# Patient Record
Sex: FEMALE | Race: BLACK OR AFRICAN AMERICAN | NOT HISPANIC OR LATINO | URBAN - METROPOLITAN AREA
[De-identification: names, ages, dates, MRNs, and addresses within clinical notes are randomized per-mention and may not be internally consistent; named-entity substitution may affect disease eponyms.]

---

## 2017-02-18 ENCOUNTER — INPATIENT (INPATIENT)
Facility: HOSPITAL | Age: 52
LOS: 9 days | Discharge: HOME | End: 2017-02-28
Attending: PSYCHIATRY & NEUROLOGY | Admitting: PSYCHIATRY & NEUROLOGY
Payer: MEDICARE

## 2017-02-18 VITALS
TEMPERATURE: 98 F | SYSTOLIC BLOOD PRESSURE: 136 MMHG | OXYGEN SATURATION: 100 % | DIASTOLIC BLOOD PRESSURE: 86 MMHG | HEART RATE: 90 BPM | RESPIRATION RATE: 18 BRPM

## 2017-02-18 DIAGNOSIS — F20.0 PARANOID SCHIZOPHRENIA: ICD-10-CM

## 2017-02-18 DIAGNOSIS — Z95.828 PRESENCE OF OTHER VASCULAR IMPLANTS AND GRAFTS: Chronic | ICD-10-CM

## 2017-02-18 LAB
ALBUMIN SERPL ELPH-MCNC: 3.5 G/DL — SIGNIFICANT CHANGE UP (ref 3.3–5)
ALP SERPL-CCNC: 71 U/L — SIGNIFICANT CHANGE UP (ref 40–120)
ALT FLD-CCNC: 15 U/L — SIGNIFICANT CHANGE UP (ref 4–33)
AMPHET UR-MCNC: NEGATIVE — SIGNIFICANT CHANGE UP
APAP SERPL-MCNC: < 15 UG/ML — LOW (ref 15–25)
APPEARANCE UR: SIGNIFICANT CHANGE UP
APTT BLD: 38.8 SEC — HIGH (ref 27.5–37.4)
AST SERPL-CCNC: 17 U/L — SIGNIFICANT CHANGE UP (ref 4–32)
BARBITURATES MEASUREMENT: NEGATIVE — SIGNIFICANT CHANGE UP
BARBITURATES UR SCN-MCNC: NEGATIVE — SIGNIFICANT CHANGE UP
BASOPHILS # BLD AUTO: 0.03 K/UL — SIGNIFICANT CHANGE UP (ref 0–0.2)
BASOPHILS NFR BLD AUTO: 0.2 % — SIGNIFICANT CHANGE UP (ref 0–2)
BASOPHILS NFR SPEC: 0 % — SIGNIFICANT CHANGE UP (ref 0–2)
BENZODIAZ SERPL-MCNC: NEGATIVE — SIGNIFICANT CHANGE UP
BENZODIAZ UR-MCNC: NEGATIVE — SIGNIFICANT CHANGE UP
BILIRUB SERPL-MCNC: 0.4 MG/DL — SIGNIFICANT CHANGE UP (ref 0.2–1.2)
BILIRUB UR-MCNC: NEGATIVE — SIGNIFICANT CHANGE UP
BLOOD UR QL VISUAL: HIGH
BUN SERPL-MCNC: 11 MG/DL — SIGNIFICANT CHANGE UP (ref 7–23)
CALCIUM SERPL-MCNC: 9.1 MG/DL — SIGNIFICANT CHANGE UP (ref 8.4–10.5)
CANNABINOIDS UR-MCNC: NEGATIVE — SIGNIFICANT CHANGE UP
CHLORIDE SERPL-SCNC: 106 MMOL/L — SIGNIFICANT CHANGE UP (ref 98–107)
CO2 SERPL-SCNC: 19 MMOL/L — LOW (ref 22–31)
COCAINE METAB.OTHER UR-MCNC: NEGATIVE — SIGNIFICANT CHANGE UP
COLOR SPEC: HIGH
CREAT SERPL-MCNC: 1.01 MG/DL — SIGNIFICANT CHANGE UP (ref 0.5–1.3)
EOSINOPHIL # BLD AUTO: 0.09 K/UL — SIGNIFICANT CHANGE UP (ref 0–0.5)
EOSINOPHIL NFR BLD AUTO: 0.6 % — SIGNIFICANT CHANGE UP (ref 0–6)
EOSINOPHIL NFR FLD: 0 % — SIGNIFICANT CHANGE UP (ref 0–6)
ETHANOL BLD-MCNC: < 10 MG/DL — SIGNIFICANT CHANGE UP
GLUCOSE SERPL-MCNC: 125 MG/DL — HIGH (ref 70–99)
GLUCOSE UR-MCNC: NEGATIVE — SIGNIFICANT CHANGE UP
HCG SERPL-ACNC: < 5 MIU/ML — SIGNIFICANT CHANGE UP
HCT VFR BLD CALC: 36.2 % — SIGNIFICANT CHANGE UP (ref 34.5–45)
HGB BLD-MCNC: 12.3 G/DL — SIGNIFICANT CHANGE UP (ref 11.5–15.5)
IMM GRANULOCYTES NFR BLD AUTO: 0.3 % — SIGNIFICANT CHANGE UP (ref 0–1.5)
INR BLD: 1.36 — HIGH (ref 0.87–1.18)
KETONES UR-MCNC: SIGNIFICANT CHANGE UP
LEUKOCYTE ESTERASE UR-ACNC: HIGH
LYMPHOCYTES # BLD AUTO: 18.9 % — SIGNIFICANT CHANGE UP (ref 13–44)
LYMPHOCYTES # BLD AUTO: 2.66 K/UL — SIGNIFICANT CHANGE UP (ref 1–3.3)
LYMPHOCYTES NFR SPEC AUTO: 22 % — SIGNIFICANT CHANGE UP (ref 13–44)
MANUAL SMEAR VERIFICATION: SIGNIFICANT CHANGE UP
MCHC RBC-ENTMCNC: 28.9 PG — SIGNIFICANT CHANGE UP (ref 27–34)
MCHC RBC-ENTMCNC: 34 % — SIGNIFICANT CHANGE UP (ref 32–36)
MCV RBC AUTO: 85 FL — SIGNIFICANT CHANGE UP (ref 80–100)
METHADONE UR-MCNC: NEGATIVE — SIGNIFICANT CHANGE UP
MICROCYTES BLD QL: SLIGHT — SIGNIFICANT CHANGE UP
MONOCYTES # BLD AUTO: 0.79 K/UL — SIGNIFICANT CHANGE UP (ref 0–0.9)
MONOCYTES NFR BLD AUTO: 5.6 % — SIGNIFICANT CHANGE UP (ref 2–14)
MONOCYTES NFR BLD: 5 % — SIGNIFICANT CHANGE UP (ref 2–9)
MUCOUS THREADS # UR AUTO: SIGNIFICANT CHANGE UP
NEUTROPHIL AB SER-ACNC: 72 % — SIGNIFICANT CHANGE UP (ref 43–77)
NEUTROPHILS # BLD AUTO: 10.43 K/UL — HIGH (ref 1.8–7.4)
NEUTROPHILS NFR BLD AUTO: 74.4 % — SIGNIFICANT CHANGE UP (ref 43–77)
NITRITE UR-MCNC: NEGATIVE — SIGNIFICANT CHANGE UP
OPIATES UR-MCNC: NEGATIVE — SIGNIFICANT CHANGE UP
OXYCODONE UR-MCNC: NEGATIVE — SIGNIFICANT CHANGE UP
PCP UR-MCNC: NEGATIVE — SIGNIFICANT CHANGE UP
PH UR: 6.5 — SIGNIFICANT CHANGE UP (ref 4.6–8)
PLATELET # BLD AUTO: 413 K/UL — HIGH (ref 150–400)
PLATELET COUNT - ESTIMATE: NORMAL — SIGNIFICANT CHANGE UP
PMV BLD: 10.5 FL — SIGNIFICANT CHANGE UP (ref 7–13)
POTASSIUM SERPL-MCNC: 3.5 MMOL/L — SIGNIFICANT CHANGE UP (ref 3.5–5.3)
POTASSIUM SERPL-SCNC: 3.5 MMOL/L — SIGNIFICANT CHANGE UP (ref 3.5–5.3)
PROT SERPL-MCNC: 7.4 G/DL — SIGNIFICANT CHANGE UP (ref 6–8.3)
PROT UR-MCNC: 100 — HIGH
PROTHROM AB SERPL-ACNC: 15.5 SEC — HIGH (ref 10–13.1)
RBC # BLD: 4.26 M/UL — SIGNIFICANT CHANGE UP (ref 3.8–5.2)
RBC # FLD: 15.4 % — HIGH (ref 10.3–14.5)
RBC CASTS # UR COMP ASSIST: >50 — HIGH (ref 0–?)
SALICYLATES SERPL-MCNC: < 5 MG/DL — LOW (ref 15–30)
SODIUM SERPL-SCNC: 138 MMOL/L — SIGNIFICANT CHANGE UP (ref 135–145)
SP GR SPEC: 1.02 — SIGNIFICANT CHANGE UP (ref 1–1.03)
SQUAMOUS # UR AUTO: SIGNIFICANT CHANGE UP
TSH SERPL-MCNC: 2.35 UIU/ML — SIGNIFICANT CHANGE UP (ref 0.27–4.2)
UROBILINOGEN FLD QL: NORMAL E.U. — SIGNIFICANT CHANGE UP (ref 0.1–0.2)
VARIANT LYMPHS # BLD: 1 % — SIGNIFICANT CHANGE UP
WBC # BLD: 14.04 K/UL — HIGH (ref 3.8–10.5)
WBC # FLD AUTO: 14.04 K/UL — HIGH (ref 3.8–10.5)
WBC UR QL: HIGH (ref 0–?)

## 2017-02-18 PROCEDURE — 99285 EMERGENCY DEPT VISIT HI MDM: CPT

## 2017-02-18 RX ORDER — HALOPERIDOL DECANOATE 100 MG/ML
5 INJECTION INTRAMUSCULAR EVERY 6 HOURS
Qty: 0 | Refills: 0 | Status: DISCONTINUED | OUTPATIENT
Start: 2017-02-18 | End: 2017-02-28

## 2017-02-18 RX ORDER — DIPHENHYDRAMINE HCL 50 MG
50 CAPSULE ORAL EVERY 6 HOURS
Qty: 0 | Refills: 0 | Status: DISCONTINUED | OUTPATIENT
Start: 2017-02-18 | End: 2017-02-28

## 2017-02-18 RX ORDER — HALOPERIDOL DECANOATE 100 MG/ML
5 INJECTION INTRAMUSCULAR EVERY 6 HOURS
Qty: 0 | Refills: 0 | Status: DISCONTINUED | OUTPATIENT
Start: 2017-02-18 | End: 2017-02-18

## 2017-02-18 RX ORDER — BENZTROPINE MESYLATE 1 MG
0.5 TABLET ORAL DAILY
Qty: 0 | Refills: 0 | Status: DISCONTINUED | OUTPATIENT
Start: 2017-02-18 | End: 2017-02-28

## 2017-02-18 RX ORDER — CEPHALEXIN 500 MG
500 CAPSULE ORAL EVERY 12 HOURS
Qty: 0 | Refills: 0 | Status: COMPLETED | OUTPATIENT
Start: 2017-02-18 | End: 2017-02-21

## 2017-02-18 RX ORDER — MIRTAZAPINE 45 MG/1
15 TABLET, ORALLY DISINTEGRATING ORAL AT BEDTIME
Qty: 0 | Refills: 0 | Status: DISCONTINUED | OUTPATIENT
Start: 2017-02-18 | End: 2017-02-28

## 2017-02-18 RX ORDER — APIXABAN 2.5 MG/1
2.5 TABLET, FILM COATED ORAL
Qty: 0 | Refills: 0 | Status: DISCONTINUED | OUTPATIENT
Start: 2017-02-18 | End: 2017-02-28

## 2017-02-18 RX ORDER — HALOPERIDOL DECANOATE 100 MG/ML
5 INJECTION INTRAMUSCULAR ONCE
Qty: 0 | Refills: 0 | Status: DISCONTINUED | OUTPATIENT
Start: 2017-02-18 | End: 2017-02-28

## 2017-02-18 RX ORDER — HALOPERIDOL DECANOATE 100 MG/ML
5 INJECTION INTRAMUSCULAR AT BEDTIME
Qty: 0 | Refills: 0 | Status: DISCONTINUED | OUTPATIENT
Start: 2017-02-18 | End: 2017-02-20

## 2017-02-18 RX ORDER — CEPHALEXIN 500 MG
500 CAPSULE ORAL EVERY 12 HOURS
Qty: 0 | Refills: 0 | Status: DISCONTINUED | OUTPATIENT
Start: 2017-02-18 | End: 2017-02-18

## 2017-02-18 RX ADMIN — HALOPERIDOL DECANOATE 5 MILLIGRAM(S): 100 INJECTION INTRAMUSCULAR at 20:49

## 2017-02-18 RX ADMIN — APIXABAN 2.5 MILLIGRAM(S): 2.5 TABLET, FILM COATED ORAL at 20:49

## 2017-02-18 RX ADMIN — Medication 500 MILLIGRAM(S): at 20:49

## 2017-02-18 RX ADMIN — MIRTAZAPINE 15 MILLIGRAM(S): 45 TABLET, ORALLY DISINTEGRATING ORAL at 20:49

## 2017-02-18 NOTE — ED BEHAVIORAL HEALTH ASSESSMENT NOTE - CURRENT MEDICATION
per daughter: Haldol 1mg po daily  Cogentin 0.5mg po daily  Ziprasidone 20mg po daily  Mirtazipine 15mg po qHS  Eliquis - patient ran out per daughter

## 2017-02-18 NOTE — ED BEHAVIORAL HEALTH ASSESSMENT NOTE - HPI (INCLUDE ILLNESS QUALITY, SEVERITY, DURATION, TIMING, CONTEXT, MODIFYING FACTORS, ASSOCIATED SIGNS AND SYMPTOMS)
51 year old [facility] female, domiciled with sister and daughter, PPH of Schizophrenia (first diagnosed 2007), infrequent prior inpatient admissions, last being 2yrs ago for psychosis, currently on haldol, cogentin, no prior suicide attempts or SIB, PMH of HTN, ?DM (patient unsure) and a blood clot in March 2016 (IVC filter removed Dec 2016), no history of substance abuse, no history of violence or arrests, brought in by EMS, activated by daughter, for decompensation, insomnia and auditory hallucinations.     Collateral obtained from daughter, Katarina,  for past two weeks has been crying daily about hearing voices telling her that they are going to kill her or kill others. She states she is fearful of the voices. They also tell her she has HIV. During this time, she has not been eating or caring for herself, not sleeping, and up at night pacing. She has been compliant with her medications, but daughter feels they are not helping her. Last time she was inpatient was over 2 yrs. ago and she had been fairly stable until recently.     Patient cooperative. Tearful in reporting her hallucinations. Reports that she has been hearing voices repeatedly stating they are going to kill her and kill her family. She is afraid to sleep because she is afraid something may happen to her or her family so she has been up and vigilant at night. Also reports that the voices tell her she has HIV. Denies SI/HI/I/P. She has been questionably compliant with her medications (cannot recall names or doses in ED and BP is elevated). Patient denies manic symptoms including elevated mood, increased irritability, mood lability, distractibility, grandiosity, pressured speech, increase in goal-directed activity, or decreased need for sleep. Denies substance abuse.

## 2017-02-18 NOTE — ED PROVIDER NOTE - MUSCULOSKELETAL, MLM
Spine appears normal, range of motion is not limited, no muscle or joint tenderness no pedal edema. no calf tenderness. normal pulses bilateral feet.

## 2017-02-18 NOTE — ED PROVIDER NOTE - PROGRESS NOTE DETAILS
Abdoulaye colon pt and daughter Katarina plan to treat with three days for urine and sent urine culture.  Continue Eliquis at 5mg qd.  pt admitted by psych. Plan abdoulaye colon psych attending as well. Abdoulaye colon pt and daughter Katarina plan to treat with three days for urine keflex 500mg bid and sent urine culture.  Continue Eliquis at 5mg qd.  pt admitted by psych. Plan abdoulaye colon psych attending as well.

## 2017-02-18 NOTE — ED ADULT NURSE REASSESSMENT NOTE - NS ED NURSE REASSESS COMMENT FT1
Pt medicated with Haldol 5mg IM as per MD rx pt in nad eval on going.  1245 Pt giving Keflex 500 mg as per MD rx eval on going.

## 2017-02-18 NOTE — ED BEHAVIORAL HEALTH ASSESSMENT NOTE - SUMMARY
51 year old [facility] female, domiciled with sister and daughter, PPH of Schizophrenia (first diagnosed 2007), infrequent prior inpatient admissions, last being 2yrs ago for psychosis, currently on haldol, cogentin, no prior suicide attempts or SIB, PMH of HTN, ?DM (patient unsure) and a blood clot in March 2016 (IVC filter removed Dec 2016), no history of substance abuse, no history of violence or arrests, brought in by EMS, activated by daughter, for decompensation, insomnia and auditory hallucinations.     Patient presents referred by daughter for decompensation. In ED, patient is paranoid and responding to  internal stimuli. She reports being fearful that the voices are going to harm her and her family and has been unable to sleep for 2 weeks as a result, as well as tearful and crying. Per daughter patient has not been eating, has lost some weight and has not been caring for herself as she usually does. Patient meets criteria for voluntary admission and is requesting to sign in. She will be admitted.

## 2017-02-18 NOTE — ED BEHAVIORAL HEALTH ASSESSMENT NOTE - OTHER
daughter Katarina Patient unable to function in her current state with acute exacerbation of psychotic symptoms

## 2017-02-18 NOTE — ED BEHAVIORAL HEALTH ASSESSMENT NOTE - OTHER PAST PSYCHIATRIC HISTORY (INCLUDE DETAILS REGARDING ONSET, COURSE OF ILLNESS, INPATIENT/OUTPATIENT TREATMENT)
History of Schizophrenia (first diagnosed 2007),Previously diagnosed with Schizophrenia in 2007 at this facility. Subsequently hospitalized in 2013 for decompensation into same. Patient is aware of multiple past medication trials but does not recall specific names. She does not believe she was ever on Clozapine, no prior suicide attempts or SIB

## 2017-02-18 NOTE — ED BEHAVIORAL HEALTH ASSESSMENT NOTE - DESCRIPTION
calm and cooperative, no prns or restraints required PMH of HTN, ?DM (patient unsure) and a blood clot in March 2016 (IVC filter removed Dec 2016) born in [place], has three grown children, lives at home with her sister and daughter

## 2017-02-18 NOTE — ED ADULT NURSE NOTE - OBJECTIVE STATEMENT
Received pt in  pt c/o Ah  & insomnia pt  calm & cooperative denies Si/Hi/Vh at present eval on going.

## 2017-02-18 NOTE — ED ADULT TRIAGE NOTE - CHIEF COMPLAINT QUOTE
Pts sister called EMS. Pt arrives with c/o not sleeping and hearing voices. Pt is calm in triage, not sure when she last took medication.

## 2017-02-18 NOTE — ED PROVIDER NOTE - OBJECTIVE STATEMENT
Attending  51y0F from home hx of schizophrenia, HTN, hx of blood clot on Eliquis pw hearing voices for two weeks. pt states "scared of the voices"  Not sleeping or eating   DW w Daughter Katarina endorses pt has been agitated, not sleeping and hearing voices. Does not endorse any sob/cp. "Breathing is not labored"  no fever/cough. no calf pain. no abdominal pain. no nausea/vomit. Daughter states pt has been taking Eliquis does not known dose. [place]  [place]. nonsmoker. no sick contact.

## 2017-02-18 NOTE — ED BEHAVIORAL HEALTH ASSESSMENT NOTE - PSYCHIATRIC ISSUES AND PLAN (INCLUDE STANDING AND PRN MEDICATION)
Increase Haldol to 5mg, keep rest of medications as is for now. Haldol 5mg PO q 4 hrs prn psychosis; Ativan 2mg PO q 4hrs prn anxiety, Diphenhydramine 50mg, PO q 4hr prn EPS, insomnia or agitaition.      Haldol 5mg IM; Ativan 2mg IM, Diphenhydramine 50mg IM once prn severe agitation (combativeness, assaultive behavior)

## 2017-02-18 NOTE — ED BEHAVIORAL HEALTH ASSESSMENT NOTE - RISK ASSESSMENT
risks include subtherapeutic dosing of medications, questionable compliance of all medications, insomnia, paranoia and acute change in psychotic symptoms. Protective factors include no suicide attempts, no violence history,  no access to guns, no substance abuse, supportive family, willingness to seek help, no suicidal ideation or homicidal ideation

## 2017-02-19 LAB — SPECIMEN SOURCE: SIGNIFICANT CHANGE UP

## 2017-02-19 RX ADMIN — Medication 500 MILLIGRAM(S): at 08:51

## 2017-02-19 RX ADMIN — Medication 500 MILLIGRAM(S): at 20:29

## 2017-02-19 RX ADMIN — Medication 0.5 MILLIGRAM(S): at 08:51

## 2017-02-19 RX ADMIN — APIXABAN 2.5 MILLIGRAM(S): 2.5 TABLET, FILM COATED ORAL at 10:03

## 2017-02-19 RX ADMIN — MIRTAZAPINE 15 MILLIGRAM(S): 45 TABLET, ORALLY DISINTEGRATING ORAL at 20:31

## 2017-02-19 RX ADMIN — HALOPERIDOL DECANOATE 5 MILLIGRAM(S): 100 INJECTION INTRAMUSCULAR at 20:29

## 2017-02-19 RX ADMIN — APIXABAN 2.5 MILLIGRAM(S): 2.5 TABLET, FILM COATED ORAL at 20:29

## 2017-02-20 LAB — BACTERIA UR CULT: SIGNIFICANT CHANGE UP

## 2017-02-20 PROCEDURE — 99232 SBSQ HOSP IP/OBS MODERATE 35: CPT

## 2017-02-20 RX ORDER — HALOPERIDOL DECANOATE 100 MG/ML
7.5 INJECTION INTRAMUSCULAR AT BEDTIME
Qty: 0 | Refills: 0 | Status: DISCONTINUED | OUTPATIENT
Start: 2017-02-20 | End: 2017-02-23

## 2017-02-20 RX ADMIN — APIXABAN 2.5 MILLIGRAM(S): 2.5 TABLET, FILM COATED ORAL at 20:53

## 2017-02-20 RX ADMIN — APIXABAN 2.5 MILLIGRAM(S): 2.5 TABLET, FILM COATED ORAL at 10:00

## 2017-02-20 RX ADMIN — Medication 500 MILLIGRAM(S): at 20:53

## 2017-02-20 RX ADMIN — Medication 0.5 MILLIGRAM(S): at 10:04

## 2017-02-20 RX ADMIN — MIRTAZAPINE 15 MILLIGRAM(S): 45 TABLET, ORALLY DISINTEGRATING ORAL at 20:53

## 2017-02-20 RX ADMIN — Medication 500 MILLIGRAM(S): at 10:04

## 2017-02-20 RX ADMIN — HALOPERIDOL DECANOATE 7.5 MILLIGRAM(S): 100 INJECTION INTRAMUSCULAR at 20:53

## 2017-02-21 PROCEDURE — 99233 SBSQ HOSP IP/OBS HIGH 50: CPT

## 2017-02-21 RX ADMIN — MIRTAZAPINE 15 MILLIGRAM(S): 45 TABLET, ORALLY DISINTEGRATING ORAL at 20:38

## 2017-02-21 RX ADMIN — Medication 500 MILLIGRAM(S): at 09:13

## 2017-02-21 RX ADMIN — Medication 0.5 MILLIGRAM(S): at 09:13

## 2017-02-21 RX ADMIN — APIXABAN 2.5 MILLIGRAM(S): 2.5 TABLET, FILM COATED ORAL at 20:38

## 2017-02-21 RX ADMIN — APIXABAN 2.5 MILLIGRAM(S): 2.5 TABLET, FILM COATED ORAL at 09:13

## 2017-02-21 RX ADMIN — HALOPERIDOL DECANOATE 7.5 MILLIGRAM(S): 100 INJECTION INTRAMUSCULAR at 20:38

## 2017-02-22 LAB
ANISOCYTOSIS BLD QL: SLIGHT — SIGNIFICANT CHANGE UP
BASOPHILS # BLD AUTO: 0.05 K/UL — SIGNIFICANT CHANGE UP (ref 0–0.2)
BASOPHILS NFR BLD AUTO: 0.5 % — SIGNIFICANT CHANGE UP (ref 0–2)
BASOPHILS NFR SPEC: 0 % — SIGNIFICANT CHANGE UP (ref 0–2)
CHOLEST SERPL-MCNC: 157 MG/DL — SIGNIFICANT CHANGE UP (ref 120–199)
EOSINOPHIL # BLD AUTO: 0.29 K/UL — SIGNIFICANT CHANGE UP (ref 0–0.5)
EOSINOPHIL NFR BLD AUTO: 2.8 % — SIGNIFICANT CHANGE UP (ref 0–6)
EOSINOPHIL NFR FLD: 5.2 % — SIGNIFICANT CHANGE UP (ref 0–6)
GIANT PLATELETS BLD QL SMEAR: PRESENT — SIGNIFICANT CHANGE UP
HBA1C BLD-MCNC: 5.3 % — SIGNIFICANT CHANGE UP (ref 4–5.6)
HCT VFR BLD CALC: 35.4 % — SIGNIFICANT CHANGE UP (ref 34.5–45)
HDLC SERPL-MCNC: 44 MG/DL — LOW (ref 45–65)
HGB BLD-MCNC: 11.8 G/DL — SIGNIFICANT CHANGE UP (ref 11.5–15.5)
IMM GRANULOCYTES NFR BLD AUTO: 0.1 % — SIGNIFICANT CHANGE UP (ref 0–1.5)
LIPID PNL WITH DIRECT LDL SERPL: 101 MG/DL — SIGNIFICANT CHANGE UP
LYMPHOCYTES # BLD AUTO: 3.19 K/UL — SIGNIFICANT CHANGE UP (ref 1–3.3)
LYMPHOCYTES # BLD AUTO: 30.9 % — SIGNIFICANT CHANGE UP (ref 13–44)
LYMPHOCYTES NFR SPEC AUTO: 27.8 % — SIGNIFICANT CHANGE UP (ref 13–44)
MACROCYTES BLD QL: SLIGHT — SIGNIFICANT CHANGE UP
MCHC RBC-ENTMCNC: 28.6 PG — SIGNIFICANT CHANGE UP (ref 27–34)
MCHC RBC-ENTMCNC: 33.3 % — SIGNIFICANT CHANGE UP (ref 32–36)
MCV RBC AUTO: 85.9 FL — SIGNIFICANT CHANGE UP (ref 80–100)
MICROCYTES BLD QL: SLIGHT — SIGNIFICANT CHANGE UP
MONOCYTES # BLD AUTO: 0.67 K/UL — SIGNIFICANT CHANGE UP (ref 0–0.9)
MONOCYTES NFR BLD AUTO: 6.5 % — SIGNIFICANT CHANGE UP (ref 2–14)
MONOCYTES NFR BLD: 3.5 % — SIGNIFICANT CHANGE UP (ref 2–9)
NEUTROPHIL AB SER-ACNC: 60.9 % — SIGNIFICANT CHANGE UP (ref 43–77)
NEUTROPHILS # BLD AUTO: 6.1 K/UL — SIGNIFICANT CHANGE UP (ref 1.8–7.4)
NEUTROPHILS NFR BLD AUTO: 59.2 % — SIGNIFICANT CHANGE UP (ref 43–77)
PLATELET # BLD AUTO: 395 K/UL — SIGNIFICANT CHANGE UP (ref 150–400)
PLATELET COUNT - ESTIMATE: NORMAL — SIGNIFICANT CHANGE UP
PMV BLD: 11.2 FL — SIGNIFICANT CHANGE UP (ref 7–13)
POLYCHROMASIA BLD QL SMEAR: SLIGHT — SIGNIFICANT CHANGE UP
RBC # BLD: 4.12 M/UL — SIGNIFICANT CHANGE UP (ref 3.8–5.2)
RBC # FLD: 15.5 % — HIGH (ref 10.3–14.5)
ROULEAUX BLD QL SMEAR: PRESENT — SIGNIFICANT CHANGE UP
TRIGL SERPL-MCNC: 112 MG/DL — SIGNIFICANT CHANGE UP (ref 10–149)
VARIANT LYMPHS # BLD: 2.6 % — SIGNIFICANT CHANGE UP
WBC # BLD: 10.31 K/UL — SIGNIFICANT CHANGE UP (ref 3.8–10.5)
WBC # FLD AUTO: 10.31 K/UL — SIGNIFICANT CHANGE UP (ref 3.8–10.5)

## 2017-02-22 PROCEDURE — 99232 SBSQ HOSP IP/OBS MODERATE 35: CPT

## 2017-02-22 RX ORDER — ACETAMINOPHEN 500 MG
500 TABLET ORAL EVERY 6 HOURS
Qty: 0 | Refills: 0 | Status: DISCONTINUED | OUTPATIENT
Start: 2017-02-22 | End: 2017-02-22

## 2017-02-22 RX ORDER — METFORMIN HYDROCHLORIDE 850 MG/1
500 TABLET ORAL AT BEDTIME
Qty: 0 | Refills: 0 | Status: DISCONTINUED | OUTPATIENT
Start: 2017-02-22 | End: 2017-02-28

## 2017-02-22 RX ORDER — ACETAMINOPHEN 500 MG
650 TABLET ORAL EVERY 6 HOURS
Qty: 0 | Refills: 0 | Status: DISCONTINUED | OUTPATIENT
Start: 2017-02-22 | End: 2017-02-28

## 2017-02-22 RX ADMIN — APIXABAN 2.5 MILLIGRAM(S): 2.5 TABLET, FILM COATED ORAL at 21:05

## 2017-02-22 RX ADMIN — APIXABAN 2.5 MILLIGRAM(S): 2.5 TABLET, FILM COATED ORAL at 08:17

## 2017-02-22 RX ADMIN — HALOPERIDOL DECANOATE 7.5 MILLIGRAM(S): 100 INJECTION INTRAMUSCULAR at 21:05

## 2017-02-22 RX ADMIN — Medication 0.5 MILLIGRAM(S): at 08:17

## 2017-02-22 RX ADMIN — METFORMIN HYDROCHLORIDE 500 MILLIGRAM(S): 850 TABLET ORAL at 21:05

## 2017-02-22 RX ADMIN — MIRTAZAPINE 15 MILLIGRAM(S): 45 TABLET, ORALLY DISINTEGRATING ORAL at 21:05

## 2017-02-22 RX ADMIN — Medication 650 MILLIGRAM(S): at 11:21

## 2017-02-22 RX ADMIN — Medication 20 MILLIGRAM(S): at 22:24

## 2017-02-23 PROCEDURE — 99232 SBSQ HOSP IP/OBS MODERATE 35: CPT

## 2017-02-23 RX ORDER — ZIPRASIDONE HYDROCHLORIDE 20 MG/1
40 CAPSULE ORAL
Qty: 0 | Refills: 0 | Status: DISCONTINUED | OUTPATIENT
Start: 2017-02-23 | End: 2017-02-24

## 2017-02-23 RX ORDER — HALOPERIDOL DECANOATE 100 MG/ML
5 INJECTION INTRAMUSCULAR AT BEDTIME
Qty: 0 | Refills: 0 | Status: DISCONTINUED | OUTPATIENT
Start: 2017-02-23 | End: 2017-02-24

## 2017-02-23 RX ORDER — ZIPRASIDONE HYDROCHLORIDE 20 MG/1
40 CAPSULE ORAL AT BEDTIME
Qty: 0 | Refills: 0 | Status: DISCONTINUED | OUTPATIENT
Start: 2017-02-23 | End: 2017-02-23

## 2017-02-23 RX ADMIN — APIXABAN 2.5 MILLIGRAM(S): 2.5 TABLET, FILM COATED ORAL at 21:07

## 2017-02-23 RX ADMIN — Medication 0.5 MILLIGRAM(S): at 09:27

## 2017-02-23 RX ADMIN — MIRTAZAPINE 15 MILLIGRAM(S): 45 TABLET, ORALLY DISINTEGRATING ORAL at 21:08

## 2017-02-23 RX ADMIN — APIXABAN 2.5 MILLIGRAM(S): 2.5 TABLET, FILM COATED ORAL at 09:27

## 2017-02-23 RX ADMIN — METFORMIN HYDROCHLORIDE 500 MILLIGRAM(S): 850 TABLET ORAL at 21:08

## 2017-02-23 RX ADMIN — ZIPRASIDONE HYDROCHLORIDE 40 MILLIGRAM(S): 20 CAPSULE ORAL at 18:21

## 2017-02-23 RX ADMIN — HALOPERIDOL DECANOATE 5 MILLIGRAM(S): 100 INJECTION INTRAMUSCULAR at 21:08

## 2017-02-23 RX ADMIN — Medication 20 MILLIGRAM(S): at 21:08

## 2017-02-24 LAB
APPEARANCE UR: CLEAR — SIGNIFICANT CHANGE UP
BILIRUB UR-MCNC: NEGATIVE — SIGNIFICANT CHANGE UP
BLOOD UR QL VISUAL: NEGATIVE — SIGNIFICANT CHANGE UP
COLOR SPEC: SIGNIFICANT CHANGE UP
GLUCOSE UR-MCNC: NEGATIVE — SIGNIFICANT CHANGE UP
KETONES UR-MCNC: NEGATIVE — SIGNIFICANT CHANGE UP
LEUKOCYTE ESTERASE UR-ACNC: NEGATIVE — SIGNIFICANT CHANGE UP
NITRITE UR-MCNC: NEGATIVE — SIGNIFICANT CHANGE UP
PH UR: 6.5 — SIGNIFICANT CHANGE UP (ref 4.6–8)
PROT UR-MCNC: NEGATIVE — SIGNIFICANT CHANGE UP
RBC CASTS # UR COMP ASSIST: SIGNIFICANT CHANGE UP (ref 0–?)
RPR SERPL-ACNC: NONREACTIVE — SIGNIFICANT CHANGE UP
SP GR SPEC: 1.01 — SIGNIFICANT CHANGE UP (ref 1–1.03)
SQUAMOUS # UR AUTO: SIGNIFICANT CHANGE UP
T PALLIDUM AB TITR SER: POSITIVE — SIGNIFICANT CHANGE UP
T PALLIDUM IGG SER QL IF: REACTIVE — SIGNIFICANT CHANGE UP
UROBILINOGEN FLD QL: NORMAL E.U. — SIGNIFICANT CHANGE UP (ref 0.1–0.2)
WBC UR QL: SIGNIFICANT CHANGE UP (ref 0–?)

## 2017-02-24 PROCEDURE — 99232 SBSQ HOSP IP/OBS MODERATE 35: CPT

## 2017-02-24 RX ORDER — ZIPRASIDONE HYDROCHLORIDE 20 MG/1
80 CAPSULE ORAL
Qty: 0 | Refills: 0 | Status: DISCONTINUED | OUTPATIENT
Start: 2017-02-25 | End: 2017-02-28

## 2017-02-24 RX ORDER — ZIPRASIDONE HYDROCHLORIDE 20 MG/1
40 CAPSULE ORAL
Qty: 0 | Refills: 0 | Status: COMPLETED | OUTPATIENT
Start: 2017-02-24 | End: 2017-02-24

## 2017-02-24 RX ORDER — HALOPERIDOL DECANOATE 100 MG/ML
2.5 INJECTION INTRAMUSCULAR AT BEDTIME
Qty: 0 | Refills: 0 | Status: DISCONTINUED | OUTPATIENT
Start: 2017-02-24 | End: 2017-02-27

## 2017-02-24 RX ADMIN — APIXABAN 2.5 MILLIGRAM(S): 2.5 TABLET, FILM COATED ORAL at 11:46

## 2017-02-24 RX ADMIN — APIXABAN 2.5 MILLIGRAM(S): 2.5 TABLET, FILM COATED ORAL at 20:28

## 2017-02-24 RX ADMIN — Medication 20 MILLIGRAM(S): at 20:28

## 2017-02-24 RX ADMIN — HALOPERIDOL DECANOATE 2.5 MILLIGRAM(S): 100 INJECTION INTRAMUSCULAR at 20:28

## 2017-02-24 RX ADMIN — ZIPRASIDONE HYDROCHLORIDE 40 MILLIGRAM(S): 20 CAPSULE ORAL at 17:17

## 2017-02-24 RX ADMIN — METFORMIN HYDROCHLORIDE 500 MILLIGRAM(S): 850 TABLET ORAL at 20:28

## 2017-02-24 RX ADMIN — MIRTAZAPINE 15 MILLIGRAM(S): 45 TABLET, ORALLY DISINTEGRATING ORAL at 20:28

## 2017-02-24 RX ADMIN — Medication 0.5 MILLIGRAM(S): at 11:46

## 2017-02-25 RX ADMIN — MIRTAZAPINE 15 MILLIGRAM(S): 45 TABLET, ORALLY DISINTEGRATING ORAL at 20:36

## 2017-02-25 RX ADMIN — ZIPRASIDONE HYDROCHLORIDE 80 MILLIGRAM(S): 20 CAPSULE ORAL at 16:54

## 2017-02-25 RX ADMIN — METFORMIN HYDROCHLORIDE 500 MILLIGRAM(S): 850 TABLET ORAL at 20:36

## 2017-02-25 RX ADMIN — APIXABAN 2.5 MILLIGRAM(S): 2.5 TABLET, FILM COATED ORAL at 08:47

## 2017-02-25 RX ADMIN — APIXABAN 2.5 MILLIGRAM(S): 2.5 TABLET, FILM COATED ORAL at 20:36

## 2017-02-25 RX ADMIN — Medication 0.5 MILLIGRAM(S): at 08:47

## 2017-02-25 RX ADMIN — Medication 20 MILLIGRAM(S): at 20:36

## 2017-02-25 RX ADMIN — HALOPERIDOL DECANOATE 2.5 MILLIGRAM(S): 100 INJECTION INTRAMUSCULAR at 20:36

## 2017-02-26 RX ADMIN — ZIPRASIDONE HYDROCHLORIDE 80 MILLIGRAM(S): 20 CAPSULE ORAL at 16:53

## 2017-02-26 RX ADMIN — Medication 20 MILLIGRAM(S): at 21:18

## 2017-02-26 RX ADMIN — HALOPERIDOL DECANOATE 2.5 MILLIGRAM(S): 100 INJECTION INTRAMUSCULAR at 21:18

## 2017-02-26 RX ADMIN — Medication 0.5 MILLIGRAM(S): at 08:55

## 2017-02-26 RX ADMIN — APIXABAN 2.5 MILLIGRAM(S): 2.5 TABLET, FILM COATED ORAL at 08:55

## 2017-02-26 RX ADMIN — APIXABAN 2.5 MILLIGRAM(S): 2.5 TABLET, FILM COATED ORAL at 21:18

## 2017-02-26 RX ADMIN — MIRTAZAPINE 15 MILLIGRAM(S): 45 TABLET, ORALLY DISINTEGRATING ORAL at 21:18

## 2017-02-26 RX ADMIN — METFORMIN HYDROCHLORIDE 500 MILLIGRAM(S): 850 TABLET ORAL at 21:18

## 2017-02-27 PROCEDURE — 99232 SBSQ HOSP IP/OBS MODERATE 35: CPT

## 2017-02-27 PROCEDURE — 93010 ELECTROCARDIOGRAM REPORT: CPT

## 2017-02-27 RX ORDER — ZIPRASIDONE HYDROCHLORIDE 20 MG/1
1 CAPSULE ORAL
Qty: 15 | Refills: 0
Start: 2017-02-27 | End: 2017-03-14

## 2017-02-27 RX ORDER — MIRTAZAPINE 45 MG/1
1 TABLET, ORALLY DISINTEGRATING ORAL
Qty: 15 | Refills: 0
Start: 2017-02-27 | End: 2017-03-14

## 2017-02-27 RX ORDER — APIXABAN 2.5 MG/1
1 TABLET, FILM COATED ORAL
Qty: 30 | Refills: 0
Start: 2017-02-27 | End: 2017-03-14

## 2017-02-27 RX ORDER — HALOPERIDOL DECANOATE 100 MG/ML
2 INJECTION INTRAMUSCULAR AT BEDTIME
Qty: 0 | Refills: 0 | Status: DISCONTINUED | OUTPATIENT
Start: 2017-02-27 | End: 2017-02-28

## 2017-02-27 RX ORDER — BENZTROPINE MESYLATE 1 MG
1 TABLET ORAL
Qty: 15 | Refills: 0
Start: 2017-02-27 | End: 2017-03-14

## 2017-02-27 RX ORDER — HALOPERIDOL DECANOATE 100 MG/ML
1 INJECTION INTRAMUSCULAR
Qty: 15 | Refills: 0
Start: 2017-02-27 | End: 2017-03-14

## 2017-02-27 RX ORDER — METFORMIN HYDROCHLORIDE 850 MG/1
1 TABLET ORAL
Qty: 15 | Refills: 0
Start: 2017-02-27 | End: 2017-03-14

## 2017-02-27 RX ADMIN — APIXABAN 2.5 MILLIGRAM(S): 2.5 TABLET, FILM COATED ORAL at 09:04

## 2017-02-27 RX ADMIN — Medication 0.5 MILLIGRAM(S): at 09:04

## 2017-02-27 RX ADMIN — ZIPRASIDONE HYDROCHLORIDE 80 MILLIGRAM(S): 20 CAPSULE ORAL at 17:44

## 2017-02-27 RX ADMIN — Medication 20 MILLIGRAM(S): at 21:11

## 2017-02-27 RX ADMIN — MIRTAZAPINE 15 MILLIGRAM(S): 45 TABLET, ORALLY DISINTEGRATING ORAL at 21:11

## 2017-02-27 RX ADMIN — APIXABAN 2.5 MILLIGRAM(S): 2.5 TABLET, FILM COATED ORAL at 21:10

## 2017-02-27 RX ADMIN — METFORMIN HYDROCHLORIDE 500 MILLIGRAM(S): 850 TABLET ORAL at 21:11

## 2017-02-27 RX ADMIN — HALOPERIDOL DECANOATE 2 MILLIGRAM(S): 100 INJECTION INTRAMUSCULAR at 21:11

## 2017-02-28 VITALS — TEMPERATURE: 98 F | HEART RATE: 93 BPM

## 2017-02-28 PROCEDURE — 99223 1ST HOSP IP/OBS HIGH 75: CPT

## 2017-02-28 PROCEDURE — 99239 HOSP IP/OBS DSCHRG MGMT >30: CPT

## 2017-02-28 RX ORDER — PENICILLIN G BENZATHINE 1200000 [IU]/2ML
2.4 INJECTION, SUSPENSION INTRAMUSCULAR ONCE
Qty: 0 | Refills: 0 | Status: COMPLETED | OUTPATIENT
Start: 2017-02-28 | End: 2017-02-28

## 2017-02-28 RX ADMIN — Medication 0.5 MILLIGRAM(S): at 09:08

## 2017-02-28 RX ADMIN — APIXABAN 2.5 MILLIGRAM(S): 2.5 TABLET, FILM COATED ORAL at 09:08

## 2017-02-28 RX ADMIN — PENICILLIN G BENZATHINE 2.4 MILLION UNIT(S): 1200000 INJECTION, SUSPENSION INTRAMUSCULAR at 14:30

## 2018-09-10 ENCOUNTER — APPOINTMENT (OUTPATIENT)
Dept: SURGERY | Facility: CLINIC | Age: 53
End: 2018-09-10

## 2018-09-12 ENCOUNTER — APPOINTMENT (OUTPATIENT)
Dept: SURGERY | Facility: CLINIC | Age: 53
End: 2018-09-12

## 2018-09-14 ENCOUNTER — APPOINTMENT (OUTPATIENT)
Dept: SURGERY | Facility: CLINIC | Age: 53
End: 2018-09-14
Payer: MEDICARE

## 2018-09-14 VITALS
WEIGHT: 233 LBS | DIASTOLIC BLOOD PRESSURE: 85 MMHG | SYSTOLIC BLOOD PRESSURE: 134 MMHG | HEART RATE: 88 BPM | OXYGEN SATURATION: 95 % | BODY MASS INDEX: 41.29 KG/M2 | HEIGHT: 63 IN

## 2018-09-14 PROCEDURE — 99204 OFFICE O/P NEW MOD 45 MIN: CPT

## 2018-09-14 RX ORDER — PRAVASTATIN SODIUM 20 MG/1
20 TABLET ORAL
Refills: 0 | Status: ACTIVE | COMMUNITY

## 2018-09-14 RX ORDER — LORATADINE 10 MG/1
10 TABLET ORAL
Refills: 0 | Status: ACTIVE | COMMUNITY

## 2018-09-14 RX ORDER — ZIPRASIDONE HYDROCHLORIDE 80 MG/1
80 CAPSULE ORAL
Refills: 0 | Status: ACTIVE | COMMUNITY

## 2018-09-20 ENCOUNTER — OTHER (OUTPATIENT)
Age: 53
End: 2018-09-20

## 2018-09-20 DIAGNOSIS — R79.1 ABNORMAL COAGULATION PROFILE: ICD-10-CM

## 2018-09-20 LAB
25(OH)D3 SERPL-MCNC: 18.8 NG/ML
ALBUMIN SERPL ELPH-MCNC: 4.1 G/DL
ALP BLD-CCNC: 64 U/L
ALT SERPL-CCNC: 13 U/L
ANION GAP SERPL CALC-SCNC: 18 MMOL/L
APTT BLD: 41.7 SEC
AST SERPL-CCNC: 21 U/L
BASOPHILS # BLD AUTO: 0.03 K/UL
BASOPHILS NFR BLD AUTO: 0.3 %
BILIRUB SERPL-MCNC: 0.4 MG/DL
BUN SERPL-MCNC: 14 MG/DL
CALCIUM SERPL-MCNC: 9.9 MG/DL
CHLORIDE SERPL-SCNC: 103 MMOL/L
CHOLEST SERPL-MCNC: 190 MG/DL
CHOLEST/HDLC SERPL: 3.9 RATIO
CO2 SERPL-SCNC: 20 MMOL/L
CREAT SERPL-MCNC: 0.91 MG/DL
EOSINOPHIL # BLD AUTO: 0.16 K/UL
EOSINOPHIL NFR BLD AUTO: 1.4 %
FERRITIN SERPL-MCNC: 60 NG/ML
FOLATE SERPL-MCNC: >20 NG/ML
GLUCOSE SERPL-MCNC: 86 MG/DL
HBA1C MFR BLD HPLC: 5.9 %
HCT VFR BLD CALC: 43.9 %
HDLC SERPL-MCNC: 49 MG/DL
HGB BLD-MCNC: 14.6 G/DL
IMM GRANULOCYTES NFR BLD AUTO: 0.2 %
INR PPP: 0.98 RATIO
IRON SATN MFR SERPL: 16 %
IRON SERPL-MCNC: 58 UG/DL
LDLC SERPL CALC-MCNC: 114 MG/DL
LYMPHOCYTES # BLD AUTO: 2.48 K/UL
LYMPHOCYTES NFR BLD AUTO: 21.4 %
MAN DIFF?: NORMAL
MCHC RBC-ENTMCNC: 29.3 PG
MCHC RBC-ENTMCNC: 33.3 GM/DL
MCV RBC AUTO: 88 FL
MONOCYTES # BLD AUTO: 0.68 K/UL
MONOCYTES NFR BLD AUTO: 5.9 %
NEUTROPHILS # BLD AUTO: 8.2 K/UL
NEUTROPHILS NFR BLD AUTO: 70.8 %
PLATELET # BLD AUTO: 364 K/UL
POTASSIUM SERPL-SCNC: 4.2 MMOL/L
PROT SERPL-MCNC: 7.8 G/DL
PT BLD: 11.1 SEC
RBC # BLD: 4.99 M/UL
RBC # FLD: 14.8 %
SODIUM SERPL-SCNC: 141 MMOL/L
TIBC SERPL-MCNC: 369 UG/DL
TRIGL SERPL-MCNC: 135 MG/DL
TSH SERPL-ACNC: 0.97 UIU/ML
UIBC SERPL-MCNC: 311 UG/DL
VIT B1 SERPL-MCNC: 104.8 NMOL/L
VIT B12 SERPL-MCNC: 879 PG/ML
WBC # FLD AUTO: 11.57 K/UL

## 2018-09-20 RX ORDER — UBIDECARENONE/VIT E ACET 100MG-5
50 MCG CAPSULE ORAL
Qty: 30 | Refills: 2 | Status: ACTIVE | COMMUNITY
Start: 2018-09-20 | End: 1900-01-01

## 2018-10-10 ENCOUNTER — APPOINTMENT (OUTPATIENT)
Dept: CARDIOLOGY | Facility: CLINIC | Age: 53
End: 2018-10-10
Payer: MEDICARE

## 2018-10-10 ENCOUNTER — OUTPATIENT (OUTPATIENT)
Dept: OUTPATIENT SERVICES | Facility: HOSPITAL | Age: 53
LOS: 1 days | End: 2018-10-10
Payer: MEDICARE

## 2018-10-10 VITALS
HEART RATE: 88 BPM | BODY MASS INDEX: 40.75 KG/M2 | HEIGHT: 63 IN | SYSTOLIC BLOOD PRESSURE: 136 MMHG | OXYGEN SATURATION: 95 % | WEIGHT: 230 LBS | RESPIRATION RATE: 16 BRPM | DIASTOLIC BLOOD PRESSURE: 82 MMHG

## 2018-10-10 DIAGNOSIS — Z00.8 ENCOUNTER FOR OTHER GENERAL EXAMINATION: ICD-10-CM

## 2018-10-10 DIAGNOSIS — Z95.828 PRESENCE OF OTHER VASCULAR IMPLANTS AND GRAFTS: Chronic | ICD-10-CM

## 2018-10-10 PROCEDURE — G0463: CPT | Mod: 25

## 2018-10-10 PROCEDURE — ZZZZZ: CPT

## 2018-10-15 DIAGNOSIS — Z01.810 ENCOUNTER FOR PREPROCEDURAL CARDIOVASCULAR EXAMINATION: ICD-10-CM

## 2018-10-15 DIAGNOSIS — Z86.718 PERSONAL HISTORY OF OTHER VENOUS THROMBOSIS AND EMBOLISM: ICD-10-CM

## 2018-10-15 DIAGNOSIS — E66.01 MORBID (SEVERE) OBESITY DUE TO EXCESS CALORIES: ICD-10-CM

## 2018-10-15 DIAGNOSIS — Z86.711 PERSONAL HISTORY OF PULMONARY EMBOLISM: ICD-10-CM

## 2018-10-16 ENCOUNTER — APPOINTMENT (OUTPATIENT)
Dept: PULMONOLOGY | Facility: CLINIC | Age: 53
End: 2018-10-16
Payer: MEDICARE

## 2018-10-16 VITALS
WEIGHT: 230 LBS | DIASTOLIC BLOOD PRESSURE: 79 MMHG | SYSTOLIC BLOOD PRESSURE: 115 MMHG | OXYGEN SATURATION: 98 % | TEMPERATURE: 98.4 F | HEART RATE: 71 BPM | HEIGHT: 63 IN | BODY MASS INDEX: 40.75 KG/M2

## 2018-10-16 PROCEDURE — 99203 OFFICE O/P NEW LOW 30 MIN: CPT | Mod: 25

## 2018-10-16 PROCEDURE — 94729 DIFFUSING CAPACITY: CPT

## 2018-10-16 PROCEDURE — 94010 BREATHING CAPACITY TEST: CPT

## 2018-10-16 PROCEDURE — 94726 PLETHYSMOGRAPHY LUNG VOLUMES: CPT

## 2018-10-16 PROCEDURE — ZZZZZ: CPT

## 2018-10-24 ENCOUNTER — OTHER (OUTPATIENT)
Age: 53
End: 2018-10-24

## 2018-10-31 ENCOUNTER — APPOINTMENT (OUTPATIENT)
Dept: CARDIOLOGY | Facility: CLINIC | Age: 53
End: 2018-10-31

## 2018-10-31 ENCOUNTER — OUTPATIENT (OUTPATIENT)
Dept: OUTPATIENT SERVICES | Facility: HOSPITAL | Age: 53
LOS: 1 days | End: 2018-10-31
Payer: MEDICARE

## 2018-10-31 DIAGNOSIS — Z95.828 PRESENCE OF OTHER VASCULAR IMPLANTS AND GRAFTS: Chronic | ICD-10-CM

## 2018-10-31 DIAGNOSIS — Z00.8 ENCOUNTER FOR OTHER GENERAL EXAMINATION: ICD-10-CM

## 2018-10-31 PROCEDURE — 93970 EXTREMITY STUDY: CPT | Mod: 26

## 2018-10-31 PROCEDURE — 93306 TTE W/DOPPLER COMPLETE: CPT

## 2018-10-31 PROCEDURE — 93970 EXTREMITY STUDY: CPT

## 2018-10-31 PROCEDURE — 93306 TTE W/DOPPLER COMPLETE: CPT | Mod: 26

## 2018-11-07 ENCOUNTER — APPOINTMENT (OUTPATIENT)
Dept: CARDIOLOGY | Facility: CLINIC | Age: 53
End: 2018-11-07

## 2018-11-15 ENCOUNTER — OTHER (OUTPATIENT)
Age: 53
End: 2018-11-15

## 2018-11-29 ENCOUNTER — APPOINTMENT (OUTPATIENT)
Dept: GASTROENTEROLOGY | Facility: CLINIC | Age: 53
End: 2018-11-29
Payer: MEDICARE

## 2018-11-29 VITALS
HEART RATE: 113 BPM | WEIGHT: 229 LBS | HEIGHT: 63 IN | OXYGEN SATURATION: 97 % | BODY MASS INDEX: 40.57 KG/M2 | TEMPERATURE: 98.9 F | DIASTOLIC BLOOD PRESSURE: 71 MMHG | SYSTOLIC BLOOD PRESSURE: 135 MMHG | RESPIRATION RATE: 18 BRPM

## 2018-11-29 PROCEDURE — 99204 OFFICE O/P NEW MOD 45 MIN: CPT

## 2018-12-12 ENCOUNTER — OUTPATIENT (OUTPATIENT)
Dept: OUTPATIENT SERVICES | Facility: HOSPITAL | Age: 53
LOS: 1 days | End: 2018-12-12
Payer: MEDICARE

## 2018-12-12 ENCOUNTER — APPOINTMENT (OUTPATIENT)
Dept: CARDIOLOGY | Facility: CLINIC | Age: 53
End: 2018-12-12

## 2018-12-12 DIAGNOSIS — Z00.8 ENCOUNTER FOR OTHER GENERAL EXAMINATION: ICD-10-CM

## 2018-12-12 DIAGNOSIS — Z95.828 PRESENCE OF OTHER VASCULAR IMPLANTS AND GRAFTS: Chronic | ICD-10-CM

## 2018-12-12 PROCEDURE — 78452 HT MUSCLE IMAGE SPECT MULT: CPT

## 2018-12-12 PROCEDURE — 93017 CV STRESS TEST TRACING ONLY: CPT

## 2018-12-12 PROCEDURE — A9500: CPT

## 2018-12-12 PROCEDURE — 78452 HT MUSCLE IMAGE SPECT MULT: CPT | Mod: 26

## 2018-12-12 PROCEDURE — 93016 CV STRESS TEST SUPVJ ONLY: CPT

## 2018-12-12 PROCEDURE — 93018 CV STRESS TEST I&R ONLY: CPT

## 2018-12-19 ENCOUNTER — APPOINTMENT (OUTPATIENT)
Dept: SURGERY | Facility: CLINIC | Age: 53
End: 2018-12-19
Payer: MEDICARE

## 2018-12-19 VITALS
OXYGEN SATURATION: 95 % | SYSTOLIC BLOOD PRESSURE: 142 MMHG | DIASTOLIC BLOOD PRESSURE: 92 MMHG | HEART RATE: 80 BPM | BODY MASS INDEX: 40.74 KG/M2 | WEIGHT: 230 LBS | TEMPERATURE: 98.3 F

## 2018-12-19 PROCEDURE — 99213 OFFICE O/P EST LOW 20 MIN: CPT

## 2019-01-23 ENCOUNTER — APPOINTMENT (OUTPATIENT)
Dept: ULTRASOUND IMAGING | Facility: HOSPITAL | Age: 54
End: 2019-01-23
Payer: MEDICARE

## 2019-01-23 ENCOUNTER — OUTPATIENT (OUTPATIENT)
Dept: OUTPATIENT SERVICES | Facility: HOSPITAL | Age: 54
LOS: 1 days | End: 2019-01-23
Payer: MEDICARE

## 2019-01-23 DIAGNOSIS — E66.01 MORBID (SEVERE) OBESITY DUE TO EXCESS CALORIES: ICD-10-CM

## 2019-01-23 DIAGNOSIS — Z95.828 PRESENCE OF OTHER VASCULAR IMPLANTS AND GRAFTS: Chronic | ICD-10-CM

## 2019-01-23 PROCEDURE — 76700 US EXAM ABDOM COMPLETE: CPT

## 2019-01-23 PROCEDURE — 76700 US EXAM ABDOM COMPLETE: CPT | Mod: 26

## 2019-01-30 ENCOUNTER — RESULT REVIEW (OUTPATIENT)
Age: 54
End: 2019-01-30

## 2019-01-30 ENCOUNTER — OUTPATIENT (OUTPATIENT)
Dept: OUTPATIENT SERVICES | Facility: HOSPITAL | Age: 54
LOS: 1 days | End: 2019-01-30
Payer: MEDICARE

## 2019-01-30 DIAGNOSIS — Z01.818 ENCOUNTER FOR OTHER PREPROCEDURAL EXAMINATION: ICD-10-CM

## 2019-01-30 DIAGNOSIS — Z95.828 PRESENCE OF OTHER VASCULAR IMPLANTS AND GRAFTS: Chronic | ICD-10-CM

## 2019-01-30 PROCEDURE — 43239 EGD BIOPSY SINGLE/MULTIPLE: CPT

## 2019-01-30 PROCEDURE — 82962 GLUCOSE BLOOD TEST: CPT

## 2019-01-30 PROCEDURE — 88312 SPECIAL STAINS GROUP 1: CPT | Mod: 26

## 2019-01-30 PROCEDURE — 88305 TISSUE EXAM BY PATHOLOGIST: CPT

## 2019-01-30 PROCEDURE — 88305 TISSUE EXAM BY PATHOLOGIST: CPT | Mod: 26

## 2019-01-30 PROCEDURE — 88312 SPECIAL STAINS GROUP 1: CPT

## 2019-02-13 ENCOUNTER — OTHER (OUTPATIENT)
Age: 54
End: 2019-02-13

## 2019-02-19 RX ORDER — APIXABAN 5 MG/1
5 TABLET, FILM COATED ORAL
Refills: 0 | Status: DISCONTINUED | COMMUNITY
End: 2019-02-19

## 2019-03-25 ENCOUNTER — APPOINTMENT (OUTPATIENT)
Dept: CARDIOLOGY | Facility: CLINIC | Age: 54
End: 2019-03-25
Payer: MEDICARE

## 2019-03-25 VITALS
OXYGEN SATURATION: 95 % | HEART RATE: 82 BPM | SYSTOLIC BLOOD PRESSURE: 124 MMHG | TEMPERATURE: 98.4 F | BODY MASS INDEX: 39.87 KG/M2 | HEIGHT: 63 IN | DIASTOLIC BLOOD PRESSURE: 80 MMHG | WEIGHT: 225 LBS

## 2019-03-25 DIAGNOSIS — R06.02 SHORTNESS OF BREATH: ICD-10-CM

## 2019-03-25 DIAGNOSIS — Z86.718 PERSONAL HISTORY OF OTHER VENOUS THROMBOSIS AND EMBOLISM: ICD-10-CM

## 2019-03-25 DIAGNOSIS — Z01.810 ENCOUNTER FOR PREPROCEDURAL CARDIOVASCULAR EXAMINATION: ICD-10-CM

## 2019-03-25 DIAGNOSIS — Z86.711 PERSONAL HISTORY OF PULMONARY EMBOLISM: ICD-10-CM

## 2019-03-25 PROCEDURE — 99215 OFFICE O/P EST HI 40 MIN: CPT

## 2019-03-25 PROCEDURE — 93000 ELECTROCARDIOGRAM COMPLETE: CPT

## 2019-03-25 NOTE — PHYSICAL EXAM

## 2019-03-25 NOTE — REASON FOR VISIT
[FreeTextEntry1] : Dear Dr. Browne:\par \par I have the pleasure of re-evaluating your patient, Ms. Tip Pham, who presents today for her initial cardiovascular evaluation and for pre-operative cardiovascular evaluation in advance of elective bariatric (partial gastrectomy) surgery.  As you know, she is a 53 year old woman with morbid obesity (BMI 40-41), hyperlipidemia, and prior history of DVT and PE about 4 years ago that appears to have been unprovoked as per patient.  She had received a temporary IVC filter for some time before it was removed, but the patient was unclear why she needed the filter.  She is also no longer on anticoagulation after seeing her hematologist.  \par \par As part of her workup for bariatric surgery, she underwent both a stress test and an echocardiogram in October 2018.  The stress test did not show evidence of ischemia.  On her echocardiogram, mild global LV systolic dysfunction was noted with LVEF 45-50% (even though the LVEF was noted to be ~68% on the stress test, but the echocardiogram was provided a clearer assessment of LVEF).\par \par She is now asymptomatic from the cardiovascular perspective.  She is normotensive and euvolemic on exam.  She reports having no exertional limitations at this time.\par \par 12-lead ECG demonstrates NSR, normal axis, normal intervals.  Physical examination was remarkable for morbid obesity.\par \par From the cardiovascular perspective, Ms. Pham should be able to proceed with bariatric surgery without the need for additional cardiovascular testing.  Opal- and post-operative management strategies should also include consideration of prophylactic anticoagulation, as she has had what appears to be unprovoked and her risk of recurrent VTE therefore higher than normal.\par \par Further, because her LVEF is ~45-50%, extra precautions are needed for judicious management of opal-operative IV fluids.\par \par She can f/u with me after surgery for routine evaluation for her cardiomyopathy.\par \par Enio, thank you again for entrusting her care with me.\par \par Warmest regards,\par Po Santana

## 2019-06-25 ENCOUNTER — OUTPATIENT (OUTPATIENT)
Dept: OUTPATIENT SERVICES | Facility: HOSPITAL | Age: 54
LOS: 1 days | End: 2019-06-25
Payer: MEDICARE

## 2019-06-25 VITALS
RESPIRATION RATE: 19 BRPM | SYSTOLIC BLOOD PRESSURE: 115 MMHG | TEMPERATURE: 98 F | HEIGHT: 63 IN | HEART RATE: 92 BPM | OXYGEN SATURATION: 97 % | DIASTOLIC BLOOD PRESSURE: 76 MMHG | WEIGHT: 218.04 LBS

## 2019-06-25 DIAGNOSIS — Z01.818 ENCOUNTER FOR OTHER PREPROCEDURAL EXAMINATION: ICD-10-CM

## 2019-06-25 DIAGNOSIS — Z95.828 PRESENCE OF OTHER VASCULAR IMPLANTS AND GRAFTS: Chronic | ICD-10-CM

## 2019-06-25 DIAGNOSIS — E66.01 MORBID (SEVERE) OBESITY DUE TO EXCESS CALORIES: ICD-10-CM

## 2019-06-25 LAB — BLD GP AB SCN SERPL QL: SIGNIFICANT CHANGE UP

## 2019-06-25 PROCEDURE — 86850 RBC ANTIBODY SCREEN: CPT

## 2019-06-25 PROCEDURE — 86901 BLOOD TYPING SEROLOGIC RH(D): CPT

## 2019-06-25 PROCEDURE — G0463: CPT

## 2019-06-25 PROCEDURE — 86900 BLOOD TYPING SEROLOGIC ABO: CPT

## 2019-06-25 PROCEDURE — 36415 COLL VENOUS BLD VENIPUNCTURE: CPT

## 2019-06-25 RX ORDER — APIXABAN 2.5 MG/1
1 TABLET, FILM COATED ORAL
Qty: 0 | Refills: 0 | DISCHARGE

## 2019-06-25 NOTE — H&P PST ADULT - NSICDXFAMILYHX_GEN_ALL_CORE_FT
FAMILY HISTORY:  Family history of diabetes mellitus  Family history of heart disease  Family history of hypertension

## 2019-06-25 NOTE — H&P PST ADULT - NSICDXPROBLEM_GEN_ALL_CORE_FT
PROBLEM DIAGNOSES  Problem: Morbid obesity due to excess calories  Assessment and Plan: Patient is scheduled for laparoscopic sleeve gastrectomy, possible open on 7/1/19

## 2019-06-25 NOTE — H&P PST ADULT - NSANTHOSAYNRD_GEN_A_CORE
No. BREANNA screening performed.  STOP BANG Legend: 0-2 = LOW Risk; 3-4 = INTERMEDIATE Risk; 5-8 = HIGH Risk

## 2019-06-25 NOTE — H&P PST ADULT - HISTORY OF PRESENT ILLNESS
53year old female with pmhx of T2DM, schizophrenia, pulmonary embolism, sickle cell anemia, hyperlipidemia, anemia and stress incontinence presents with c/o failure to lose weight despite exercise, calorie reduction, diet pills, weight management and several diet modalities. Patient is here today for pre-surgical testing for scheduled laparoscopic sleeve gastrectomy, possible open on 7/1/19

## 2019-06-25 NOTE — H&P PST ADULT - ATTENDING COMMENTS
patient was consented for and booked for a Nat-en-Y gastric bypass. she signed such consent in the office and office notes and discussion today corroborate gastric bypass as the choice of operation. she wishes to proceed.

## 2019-06-25 NOTE — H&P PST ADULT - NSICDXPASTMEDICALHX_GEN_ALL_CORE_FT
PAST MEDICAL HISTORY:  Anemia     Hyperlipidemia     PE (pulmonary embolism)     Schizophrenia     Sickle cell anemia

## 2019-06-26 ENCOUNTER — APPOINTMENT (OUTPATIENT)
Dept: SURGERY | Facility: CLINIC | Age: 54
End: 2019-06-26
Payer: MEDICARE

## 2019-06-26 VITALS
OXYGEN SATURATION: 98 % | WEIGHT: 213 LBS | RESPIRATION RATE: 98 BRPM | HEIGHT: 63 IN | SYSTOLIC BLOOD PRESSURE: 127 MMHG | DIASTOLIC BLOOD PRESSURE: 81 MMHG | HEART RATE: 80 BPM | BODY MASS INDEX: 37.74 KG/M2 | TEMPERATURE: 98.4 F

## 2019-06-26 PROCEDURE — 99213 OFFICE O/P EST LOW 20 MIN: CPT

## 2019-06-26 RX ORDER — OXYBUTYNIN CHLORIDE 5 MG/1
5 TABLET, EXTENDED RELEASE ORAL
Refills: 0 | Status: ACTIVE | COMMUNITY

## 2019-06-26 NOTE — PHYSICAL EXAM
[Obese] : obese [Ulcers] : no ulcers [de-identified] : obese, soft, NT, ND, gynecoid distribution [Normal] : affect appropriate

## 2019-06-26 NOTE — PLAN
[FreeTextEntry1] : 53 year old F with morbid obesity, scheduled for gastric bypass on 07/01/19. \par \par DEIDRA AVILA has undergone extensive workup and has been deemed an appropriate candidate for bariatric surgery. At this preop appointment, I discussed with the patient the risks, benefits, and alternatives to bariatric surgery. I specifically discussed the risks of DVT/PE, pneumonia, dehydration, injury to the stomach and surrounding organs, and anesthesia risks including cardiac and pulmonary complications. I discussed pre and post-operative diet protocols, to begin with a two week partial liquid diet. I discussed the importance of exercise in both the pre and post-operative time periods. The patient had ample opportunity to ask questions and all questions were answered. \par \par - instructed to start liquid diet today in preparation for surgery. \par - patient scheduled for gastric bypass, 07/01\par \par \par \par \par

## 2019-06-26 NOTE — ASSESSMENT
[FreeTextEntry1] : 53 year F with morbid obesity (BMI 37 kg/m2)for GASTRIC BYPASS. risks, benefits alternatives discussed. risks discussed include leak, death, bleeding, DVT/PE, stricture, internal hernia, incisional hernia, vitamin deficiencies, inadequate weight loss, weight regain, gallstones, kidney stones, intestinal obstruction, need for further surgery, ulcers. All questions answered. Agrees to proceed.\par \par Consent form given to patient with ample time to sign, read and ask questions. All questions answered. Expectation reviewed. Agrees to proceed. \par \par

## 2019-06-26 NOTE — HISTORY OF PRESENT ILLNESS
[de-identified] : 53 y.o F presents for preop visit, she has successfully completed workup in preparation for bariatric surgery. Patient has started with the liquid diet. \par No changes to health status. Seen with daughter today.

## 2019-06-28 PROBLEM — D64.9 ANEMIA, UNSPECIFIED: Chronic | Status: ACTIVE | Noted: 2019-06-25

## 2019-06-28 PROBLEM — E78.5 HYPERLIPIDEMIA, UNSPECIFIED: Chronic | Status: ACTIVE | Noted: 2019-06-25

## 2019-06-28 RX ORDER — SODIUM CHLORIDE 9 MG/ML
3 INJECTION INTRAMUSCULAR; INTRAVENOUS; SUBCUTANEOUS EVERY 8 HOURS
Refills: 0 | Status: DISCONTINUED | OUTPATIENT
Start: 2019-07-01 | End: 2019-07-02

## 2019-06-30 ENCOUNTER — TRANSCRIPTION ENCOUNTER (OUTPATIENT)
Age: 54
End: 2019-06-30

## 2019-07-01 ENCOUNTER — INPATIENT (INPATIENT)
Facility: HOSPITAL | Age: 54
LOS: 0 days | Discharge: HOME | DRG: 621 | End: 2019-07-02
Attending: SURGERY | Admitting: SURGERY
Payer: MEDICARE

## 2019-07-01 ENCOUNTER — RESULT REVIEW (OUTPATIENT)
Age: 54
End: 2019-07-01

## 2019-07-01 ENCOUNTER — APPOINTMENT (OUTPATIENT)
Dept: SURGERY | Facility: HOSPITAL | Age: 54
End: 2019-07-01

## 2019-07-01 VITALS
HEART RATE: 92 BPM | DIASTOLIC BLOOD PRESSURE: 69 MMHG | OXYGEN SATURATION: 97 % | HEIGHT: 63 IN | RESPIRATION RATE: 17 BRPM | TEMPERATURE: 98 F | SYSTOLIC BLOOD PRESSURE: 107 MMHG | WEIGHT: 218.04 LBS

## 2019-07-01 DIAGNOSIS — Z95.828 PRESENCE OF OTHER VASCULAR IMPLANTS AND GRAFTS: Chronic | ICD-10-CM

## 2019-07-01 DIAGNOSIS — E66.01 MORBID (SEVERE) OBESITY DUE TO EXCESS CALORIES: ICD-10-CM

## 2019-07-01 LAB — BLD GP AB SCN SERPL QL: SIGNIFICANT CHANGE UP

## 2019-07-01 PROCEDURE — 43644 LAP GASTRIC BYPASS/ROUX-EN-Y: CPT | Mod: 82

## 2019-07-01 PROCEDURE — 43644 LAP GASTRIC BYPASS/ROUX-EN-Y: CPT

## 2019-07-01 PROCEDURE — 43281 LAP PARAESOPHAG HERN REPAIR: CPT

## 2019-07-01 PROCEDURE — 88302 TISSUE EXAM BY PATHOLOGIST: CPT | Mod: 26

## 2019-07-01 RX ORDER — METOCLOPRAMIDE HCL 10 MG
10 TABLET ORAL EVERY 6 HOURS
Refills: 0 | Status: DISCONTINUED | OUTPATIENT
Start: 2019-07-01 | End: 2019-07-02

## 2019-07-01 RX ORDER — SCOPALAMINE 1 MG/3D
1 PATCH, EXTENDED RELEASE TRANSDERMAL ONCE
Refills: 0 | Status: COMPLETED | OUTPATIENT
Start: 2019-07-01 | End: 2019-07-01

## 2019-07-01 RX ORDER — PANTOPRAZOLE SODIUM 20 MG/1
40 TABLET, DELAYED RELEASE ORAL EVERY 24 HOURS
Refills: 0 | Status: DISCONTINUED | OUTPATIENT
Start: 2019-07-01 | End: 2019-07-02

## 2019-07-01 RX ORDER — ACETAMINOPHEN 500 MG
1000 TABLET ORAL ONCE
Refills: 0 | Status: COMPLETED | OUTPATIENT
Start: 2019-07-01 | End: 2019-07-01

## 2019-07-01 RX ORDER — INSULIN LISPRO 100/ML
VIAL (ML) SUBCUTANEOUS
Refills: 0 | Status: DISCONTINUED | OUTPATIENT
Start: 2019-07-01 | End: 2019-07-02

## 2019-07-01 RX ORDER — SODIUM CHLORIDE 9 MG/ML
1000 INJECTION INTRAMUSCULAR; INTRAVENOUS; SUBCUTANEOUS
Refills: 0 | Status: DISCONTINUED | OUTPATIENT
Start: 2019-07-01 | End: 2019-07-02

## 2019-07-01 RX ORDER — CEFAZOLIN SODIUM 1 G
2000 VIAL (EA) INJECTION EVERY 8 HOURS
Refills: 0 | Status: COMPLETED | OUTPATIENT
Start: 2019-07-01 | End: 2019-07-02

## 2019-07-01 RX ORDER — ONDANSETRON 8 MG/1
4 TABLET, FILM COATED ORAL EVERY 6 HOURS
Refills: 0 | Status: DISCONTINUED | OUTPATIENT
Start: 2019-07-01 | End: 2019-07-02

## 2019-07-01 RX ORDER — ACETAMINOPHEN 500 MG
1000 TABLET ORAL ONCE
Refills: 0 | Status: COMPLETED | OUTPATIENT
Start: 2019-07-02 | End: 2019-07-02

## 2019-07-01 RX ORDER — HYOSCYAMINE SULFATE 0.13 MG
0.12 TABLET ORAL EVERY 6 HOURS
Refills: 0 | Status: DISCONTINUED | OUTPATIENT
Start: 2019-07-01 | End: 2019-07-02

## 2019-07-01 RX ORDER — DEXTROSE 50 % IN WATER 50 %
12.5 SYRINGE (ML) INTRAVENOUS ONCE
Refills: 0 | Status: DISCONTINUED | OUTPATIENT
Start: 2019-07-01 | End: 2019-07-02

## 2019-07-01 RX ORDER — DEXTROSE 50 % IN WATER 50 %
15 SYRINGE (ML) INTRAVENOUS ONCE
Refills: 0 | Status: DISCONTINUED | OUTPATIENT
Start: 2019-07-01 | End: 2019-07-02

## 2019-07-01 RX ORDER — SODIUM CHLORIDE 9 MG/ML
1000 INJECTION, SOLUTION INTRAVENOUS
Refills: 0 | Status: DISCONTINUED | OUTPATIENT
Start: 2019-07-01 | End: 2019-07-01

## 2019-07-01 RX ORDER — ENOXAPARIN SODIUM 100 MG/ML
40 INJECTION SUBCUTANEOUS ONCE
Refills: 0 | Status: COMPLETED | OUTPATIENT
Start: 2019-07-01 | End: 2019-07-01

## 2019-07-01 RX ORDER — GABAPENTIN 400 MG/1
300 CAPSULE ORAL EVERY 8 HOURS
Refills: 0 | Status: DISCONTINUED | OUTPATIENT
Start: 2019-07-01 | End: 2019-07-02

## 2019-07-01 RX ORDER — HYDROMORPHONE HYDROCHLORIDE 2 MG/ML
0.5 INJECTION INTRAMUSCULAR; INTRAVENOUS; SUBCUTANEOUS
Refills: 0 | Status: DISCONTINUED | OUTPATIENT
Start: 2019-07-01 | End: 2019-07-02

## 2019-07-01 RX ORDER — SODIUM CHLORIDE 9 MG/ML
1000 INJECTION, SOLUTION INTRAVENOUS
Refills: 0 | Status: DISCONTINUED | OUTPATIENT
Start: 2019-07-01 | End: 2019-07-02

## 2019-07-01 RX ORDER — DEXTROSE 50 % IN WATER 50 %
25 SYRINGE (ML) INTRAVENOUS ONCE
Refills: 0 | Status: DISCONTINUED | OUTPATIENT
Start: 2019-07-01 | End: 2019-07-02

## 2019-07-01 RX ORDER — GABAPENTIN 400 MG/1
300 CAPSULE ORAL ONCE
Refills: 0 | Status: COMPLETED | OUTPATIENT
Start: 2019-07-01 | End: 2019-07-01

## 2019-07-01 RX ORDER — GLUCAGON INJECTION, SOLUTION 0.5 MG/.1ML
1 INJECTION, SOLUTION SUBCUTANEOUS ONCE
Refills: 0 | Status: DISCONTINUED | OUTPATIENT
Start: 2019-07-01 | End: 2019-07-02

## 2019-07-01 RX ORDER — ENOXAPARIN SODIUM 100 MG/ML
40 INJECTION SUBCUTANEOUS EVERY 12 HOURS
Refills: 0 | Status: DISCONTINUED | OUTPATIENT
Start: 2019-07-02 | End: 2019-07-02

## 2019-07-01 RX ORDER — HYDROMORPHONE HYDROCHLORIDE 2 MG/ML
0.5 INJECTION INTRAMUSCULAR; INTRAVENOUS; SUBCUTANEOUS
Refills: 0 | Status: DISCONTINUED | OUTPATIENT
Start: 2019-07-01 | End: 2019-07-01

## 2019-07-01 RX ADMIN — Medication 2: at 16:26

## 2019-07-01 RX ADMIN — Medication 400 MILLIGRAM(S): at 21:19

## 2019-07-01 RX ADMIN — Medication 10 MILLIGRAM(S): at 20:01

## 2019-07-01 RX ADMIN — PANTOPRAZOLE SODIUM 40 MILLIGRAM(S): 20 TABLET, DELAYED RELEASE ORAL at 21:21

## 2019-07-01 RX ADMIN — GABAPENTIN 300 MILLIGRAM(S): 400 CAPSULE ORAL at 22:53

## 2019-07-01 RX ADMIN — SCOPALAMINE 1 PATCH: 1 PATCH, EXTENDED RELEASE TRANSDERMAL at 21:29

## 2019-07-01 RX ADMIN — SCOPALAMINE 1 PATCH: 1 PATCH, EXTENDED RELEASE TRANSDERMAL at 08:43

## 2019-07-01 RX ADMIN — Medication 100 MILLIGRAM(S): at 17:31

## 2019-07-01 RX ADMIN — ONDANSETRON 4 MILLIGRAM(S): 8 TABLET, FILM COATED ORAL at 16:21

## 2019-07-01 RX ADMIN — ENOXAPARIN SODIUM 40 MILLIGRAM(S): 100 INJECTION SUBCUTANEOUS at 08:42

## 2019-07-01 RX ADMIN — Medication 1000 MILLIGRAM(S): at 21:50

## 2019-07-01 RX ADMIN — Medication 400 MILLIGRAM(S): at 16:19

## 2019-07-01 RX ADMIN — ONDANSETRON 4 MILLIGRAM(S): 8 TABLET, FILM COATED ORAL at 22:52

## 2019-07-01 RX ADMIN — GABAPENTIN 300 MILLIGRAM(S): 400 CAPSULE ORAL at 08:42

## 2019-07-01 RX ADMIN — SODIUM CHLORIDE 3 MILLILITER(S): 9 INJECTION INTRAMUSCULAR; INTRAVENOUS; SUBCUTANEOUS at 21:25

## 2019-07-01 RX ADMIN — Medication 1000 MILLIGRAM(S): at 16:50

## 2019-07-01 NOTE — BRIEF OPERATIVE NOTE - NSICDXBRIEFPROCEDURE_GEN_ALL_CORE_FT
PROCEDURES:  Lysis of adhesions of peritoneum 01-Jul-2019 14:13:44  Bella Haley  Laparoscopic repair, paraesophageal hernia 01-Jul-2019 14:13:13  Bella Haley  Laparoscopic gastric bypass with Nat-en-Y gastroenterostomy 01-Jul-2019 14:12:52  Bella Haley

## 2019-07-01 NOTE — PROGRESS NOTE ADULT - ASSESSMENT
53 year old female S/P Lysis of adhesions, Laparoscopic repair, paraesophageal hernia, Laparoscopic gastric bypass with Nat-en-Y gastroenterostomy POD#0, stable    - continue NPO, IVF  - DVT prophylaxis, IS, PRN pain control and PRN anti-emetic  - postoperative antibiotics x2

## 2019-07-01 NOTE — PROGRESS NOTE ADULT - SUBJECTIVE AND OBJECTIVE BOX
S/P Lysis of adhesions, Laparoscopic repair, paraesophageal hernia, Laparoscopic gastric bypass with Nat-en-Y gastroenterostomy POD#0  53y old Female seen and examined at bedside. Admits to mild nausea.  Denies abdominal pain, chest pain, shortness of breath, vomiting, and dizziness.   No void postoperatively.     Vital Signs Last 24 Hrs  T(F): 98.9 (07-01-19 @ 16:16), Max: 98.9 (07-01-19 @ 16:16)  HR: 83 (07-01-19 @ 16:16)  BP: 101/56 (07-01-19 @ 16:16)  RR: 18 (07-01-19 @ 16:16)  SpO2: 100% (07-01-19 @ 16:16)  POCT Blood Glucose.: 175 mg/dL (01 Jul 2019 16:22)    GENERAL: Alert, NAD  CHEST/LUNG: respirations nonlabored  ABDOMEN: Dressings over port sites c/d/i; soft, Appropriate incisional tenderness, Nondistended  EXTREMITIES:  no calf tenderness, No edema, intermittent compression devices in place bilaterally

## 2019-07-02 ENCOUNTER — INBOUND DOCUMENT (OUTPATIENT)
Age: 54
End: 2019-07-02

## 2019-07-02 ENCOUNTER — TRANSCRIPTION ENCOUNTER (OUTPATIENT)
Age: 54
End: 2019-07-02

## 2019-07-02 VITALS
HEART RATE: 87 BPM | RESPIRATION RATE: 16 BRPM | TEMPERATURE: 99 F | SYSTOLIC BLOOD PRESSURE: 104 MMHG | DIASTOLIC BLOOD PRESSURE: 63 MMHG | OXYGEN SATURATION: 95 %

## 2019-07-02 LAB
ANION GAP SERPL CALC-SCNC: 9 MMOL/L — SIGNIFICANT CHANGE UP (ref 5–17)
BASOPHILS # BLD AUTO: 0.01 K/UL — SIGNIFICANT CHANGE UP (ref 0–0.2)
BASOPHILS NFR BLD AUTO: 0.1 % — SIGNIFICANT CHANGE UP (ref 0–2)
BUN SERPL-MCNC: 9 MG/DL — SIGNIFICANT CHANGE UP (ref 7–18)
CALCIUM SERPL-MCNC: 7.6 MG/DL — LOW (ref 8.4–10.5)
CHLORIDE SERPL-SCNC: 112 MMOL/L — HIGH (ref 96–108)
CO2 SERPL-SCNC: 23 MMOL/L — SIGNIFICANT CHANGE UP (ref 22–31)
CREAT SERPL-MCNC: 1.09 MG/DL — SIGNIFICANT CHANGE UP (ref 0.5–1.3)
EOSINOPHIL # BLD AUTO: 0.03 K/UL — SIGNIFICANT CHANGE UP (ref 0–0.5)
EOSINOPHIL NFR BLD AUTO: 0.3 % — SIGNIFICANT CHANGE UP (ref 0–6)
GLUCOSE SERPL-MCNC: 82 MG/DL — SIGNIFICANT CHANGE UP (ref 70–99)
HCT VFR BLD CALC: 35.3 % — SIGNIFICANT CHANGE UP (ref 34.5–45)
HGB BLD-MCNC: 11.6 G/DL — SIGNIFICANT CHANGE UP (ref 11.5–15.5)
IMM GRANULOCYTES NFR BLD AUTO: 0.3 % — SIGNIFICANT CHANGE UP (ref 0–1.5)
LYMPHOCYTES # BLD AUTO: 1.14 K/UL — SIGNIFICANT CHANGE UP (ref 1–3.3)
LYMPHOCYTES # BLD AUTO: 9.7 % — LOW (ref 13–44)
MCHC RBC-ENTMCNC: 28.9 PG — SIGNIFICANT CHANGE UP (ref 27–34)
MCHC RBC-ENTMCNC: 32.9 GM/DL — SIGNIFICANT CHANGE UP (ref 32–36)
MCV RBC AUTO: 88 FL — SIGNIFICANT CHANGE UP (ref 80–100)
MONOCYTES # BLD AUTO: 1.1 K/UL — HIGH (ref 0–0.9)
MONOCYTES NFR BLD AUTO: 9.4 % — SIGNIFICANT CHANGE UP (ref 2–14)
NEUTROPHILS # BLD AUTO: 9.39 K/UL — HIGH (ref 1.8–7.4)
NEUTROPHILS NFR BLD AUTO: 80.2 % — HIGH (ref 43–77)
NRBC # BLD: 0 /100 WBCS — SIGNIFICANT CHANGE UP (ref 0–0)
PLATELET # BLD AUTO: 247 K/UL — SIGNIFICANT CHANGE UP (ref 150–400)
POTASSIUM SERPL-MCNC: 3.9 MMOL/L — SIGNIFICANT CHANGE UP (ref 3.5–5.3)
POTASSIUM SERPL-SCNC: 3.9 MMOL/L — SIGNIFICANT CHANGE UP (ref 3.5–5.3)
RBC # BLD: 4.01 M/UL — SIGNIFICANT CHANGE UP (ref 3.8–5.2)
RBC # FLD: 14.1 % — SIGNIFICANT CHANGE UP (ref 10.3–14.5)
SODIUM SERPL-SCNC: 144 MMOL/L — SIGNIFICANT CHANGE UP (ref 135–145)
WBC # BLD: 11.71 K/UL — HIGH (ref 3.8–10.5)
WBC # FLD AUTO: 11.71 K/UL — HIGH (ref 3.8–10.5)

## 2019-07-02 PROCEDURE — 86900 BLOOD TYPING SEROLOGIC ABO: CPT

## 2019-07-02 PROCEDURE — 88302 TISSUE EXAM BY PATHOLOGIST: CPT

## 2019-07-02 PROCEDURE — 82962 GLUCOSE BLOOD TEST: CPT

## 2019-07-02 PROCEDURE — 86901 BLOOD TYPING SEROLOGIC RH(D): CPT

## 2019-07-02 PROCEDURE — 85027 COMPLETE CBC AUTOMATED: CPT

## 2019-07-02 PROCEDURE — 80048 BASIC METABOLIC PNL TOTAL CA: CPT

## 2019-07-02 PROCEDURE — 86850 RBC ANTIBODY SCREEN: CPT

## 2019-07-02 PROCEDURE — 36415 COLL VENOUS BLD VENIPUNCTURE: CPT

## 2019-07-02 PROCEDURE — C1889: CPT

## 2019-07-02 RX ORDER — OXYCODONE AND ACETAMINOPHEN 5; 325 MG/1; MG/1
1 TABLET ORAL
Qty: 20 | Refills: 0
Start: 2019-07-02 | End: 2019-07-06

## 2019-07-02 RX ORDER — HYOSCYAMINE SULFATE 0.13 MG
1 TABLET ORAL
Qty: 120 | Refills: 0
Start: 2019-07-02 | End: 2019-07-31

## 2019-07-02 RX ORDER — OMEPRAZOLE 10 MG/1
1 CAPSULE, DELAYED RELEASE ORAL
Qty: 30 | Refills: 0
Start: 2019-07-02 | End: 2019-07-31

## 2019-07-02 RX ADMIN — Medication 10 MILLIGRAM(S): at 01:38

## 2019-07-02 RX ADMIN — ONDANSETRON 4 MILLIGRAM(S): 8 TABLET, FILM COATED ORAL at 12:15

## 2019-07-02 RX ADMIN — ENOXAPARIN SODIUM 40 MILLIGRAM(S): 100 INJECTION SUBCUTANEOUS at 06:03

## 2019-07-02 RX ADMIN — Medication 400 MILLIGRAM(S): at 02:54

## 2019-07-02 RX ADMIN — Medication 1000 MILLIGRAM(S): at 03:30

## 2019-07-02 RX ADMIN — ONDANSETRON 4 MILLIGRAM(S): 8 TABLET, FILM COATED ORAL at 06:04

## 2019-07-02 RX ADMIN — SODIUM CHLORIDE 3 MILLILITER(S): 9 INJECTION INTRAMUSCULAR; INTRAVENOUS; SUBCUTANEOUS at 06:06

## 2019-07-02 RX ADMIN — SODIUM CHLORIDE 140 MILLILITER(S): 9 INJECTION INTRAMUSCULAR; INTRAVENOUS; SUBCUTANEOUS at 01:44

## 2019-07-02 RX ADMIN — Medication 100 MILLIGRAM(S): at 01:38

## 2019-07-02 RX ADMIN — Medication 10 MILLIGRAM(S): at 13:53

## 2019-07-02 RX ADMIN — SODIUM CHLORIDE 3 MILLILITER(S): 9 INJECTION INTRAMUSCULAR; INTRAVENOUS; SUBCUTANEOUS at 15:59

## 2019-07-02 RX ADMIN — SCOPALAMINE 1 PATCH: 1 PATCH, EXTENDED RELEASE TRANSDERMAL at 07:39

## 2019-07-02 RX ADMIN — Medication 1000 MILLIGRAM(S): at 08:48

## 2019-07-02 RX ADMIN — GABAPENTIN 300 MILLIGRAM(S): 400 CAPSULE ORAL at 13:54

## 2019-07-02 RX ADMIN — Medication 400 MILLIGRAM(S): at 08:18

## 2019-07-02 RX ADMIN — Medication 10 MILLIGRAM(S): at 08:17

## 2019-07-02 NOTE — CHART NOTE - NSCHARTNOTEFT_GEN_A_CORE
Focus note:   Admit with  obesity (BMI 38.6). S/p sleeve gastrectomy 7/1.  Discussed with RN, pt tolerating clear liquid diet (bariatric). Pt has  discharge instruction sheet and  Bariatric Surgery Nutrition Guidelines. Reviewed diet protocols, emphasis on  adequate protein/fluid intake. Discussed support groups, exercise, sleep and follow-up with ELO LU to monitor. MALIKA garvin Focus note:   Admit with  obesity (BMI 38.6). S/p lap gastric bypass. 7/1.  Discussed with RN, pt tolerating clear liquid diet (bariatric). Pt has  discharge instruction sheet and  Bariatric Surgery Nutrition Guidelines. Reviewed diet protocols, emphasis on  adequate protein/fluid intake. Discussed support groups, exercise, sleep and follow-up with ELO LU to monitor. MALIKA garvin

## 2019-07-02 NOTE — PROGRESS NOTE ADULT - ASSESSMENT
53F s/p LRYGB and PEH repair. Stable.    1) start chary clears  2) mobilize  3) lovenox  4) f/u labs  5) if tolerates enough PO possible DC today

## 2019-07-02 NOTE — DISCHARGE NOTE NURSING/CASE MANAGEMENT/SOCIAL WORK - NSDCDPATPORTLINK_GEN_ALL_CORE
You can access the [facility] Portal, offered by [facility], by registering with the following website: [email or web address] [facility]

## 2019-07-02 NOTE — PROGRESS NOTE ADULT - SUBJECTIVE AND OBJECTIVE BOX
SUBJECTIVE    Nausea [ ] YES [X ] NO  Vomiting [ ] YES [X ] NO  Voiding normally [X ] YES [ ] NO  Pain under control [X ] YES [ ] NO  NPO  Ambulated [X ] YES NO [ ]-no lightheadedness  Using Incentive Spirometer [X ] YES [ ] NO      VITALS    ICU Vital Signs Last 24 Hrs  T(C): 37.1 (02 Jul 2019 05:02), Max: 37.2 (01 Jul 2019 16:16)  T(F): 98.7 (02 Jul 2019 05:02), Max: 98.9 (01 Jul 2019 16:16)  HR: 70 (02 Jul 2019 05:02) (70 - 97)  BP: 110/60 (02 Jul 2019 05:02) (101/56 - 156/87)  BP(mean): 103 (01 Jul 2019 15:35) (97 - 134)  ABP: --  ABP(mean): --  RR: 18 (02 Jul 2019 05:02) (15 - 19)  SpO2: 98% (02 Jul 2019 05:02) (91% - 100%)    I&O's Detail    01 Jul 2019 07:01  -  02 Jul 2019 07:00  --------------------------------------------------------  IN:    Lactated Ringers IV Bolus: 1500 mL    lactated ringers.: 100 mL    sodium chloride 0.9%.: 1820 mL  Total IN: 3420 mL    OUT:    Voided: 700 mL  Total OUT: 700 mL    Total NET: 2720 mL            PHYSICAL EXAMINATION    Abdomen:  obese, soft, NT, ND, no ecchymoses    I&O's Summary    01 Jul 2019 07:01  -  02 Jul 2019 07:00  --------------------------------------------------------  IN: 3420 mL / OUT: 700 mL / NET: 2720 mL        LABS                      MEDICATIONS:  MEDICATIONS  (STANDING):  acetaminophen  IVPB .. 1000 milliGRAM(s) IV Intermittent once  dextrose 5%. 1000 milliLiter(s) (50 mL/Hr) IV Continuous <Continuous>  dextrose 50% Injectable 12.5 Gram(s) IV Push once  dextrose 50% Injectable 25 Gram(s) IV Push once  dextrose 50% Injectable 25 Gram(s) IV Push once  enoxaparin Injectable 40 milliGRAM(s) SubCutaneous every 12 hours  gabapentin   Solution 300 milliGRAM(s) Oral every 8 hours  insulin lispro (HumaLOG) corrective regimen sliding scale   SubCutaneous three times a day before meals  metoclopramide Injectable 10 milliGRAM(s) IV Push every 6 hours  ondansetron Injectable 4 milliGRAM(s) IV Push every 6 hours  pantoprazole  Injectable 40 milliGRAM(s) IV Push every 24 hours  sodium chloride 0.9% lock flush 3 milliLiter(s) IV Push every 8 hours  sodium chloride 0.9%. 1000 milliLiter(s) (140 mL/Hr) IV Continuous <Continuous>    MEDICATIONS  (PRN):  dextrose 40% Gel 15 Gram(s) Oral once PRN Blood Glucose LESS THAN 70 milliGRAM(s)/deciliter  glucagon  Injectable 1 milliGRAM(s) IntraMuscular once PRN Glucose LESS THAN 70 milligrams/deciliter  HYDROmorphone  Injectable 0.5 milliGRAM(s) IV Push every 3 hours PRN breakthrough pain  hyoscyamine SL 0.125 milliGRAM(s) SubLingual every 6 hours PRN Gastric Spasms

## 2019-07-02 NOTE — DISCHARGE NOTE PROVIDER - CARE PROVIDER_API CALL
Enio Browne (MD)  Surgery  [address] [place] [address]  Phone: [phone]  Fax: [phone]  Follow Up Time: 1 week

## 2019-07-02 NOTE — DISCHARGE NOTE PROVIDER - HOSPITAL COURSE
53year old female with pmhx of T2DM, schizophrenia, pulmonary embolism, sickle cell anemia, hyperlipidemia, anemia and stress incontinence presents with c/o failure to lose weight despite exercise, calorie reduction, diet pills, weight management and several diet modalities. Patient is s/p laparoscopic Nat-en-Y GBP & paraesophageal hernia repair on     7/1/19. Patient tolerated procedure well tolerated clears for d/c home

## 2019-07-10 ENCOUNTER — APPOINTMENT (OUTPATIENT)
Dept: SURGERY | Facility: CLINIC | Age: 54
End: 2019-07-10
Payer: MEDICARE

## 2019-07-10 VITALS
HEIGHT: 63 IN | WEIGHT: 209 LBS | DIASTOLIC BLOOD PRESSURE: 78 MMHG | HEART RATE: 86 BPM | SYSTOLIC BLOOD PRESSURE: 116 MMHG | BODY MASS INDEX: 37.03 KG/M2

## 2019-07-10 PROCEDURE — 99024 POSTOP FOLLOW-UP VISIT: CPT

## 2019-07-10 NOTE — REASON FOR VISIT
[Gastric By-pass] : gastric by-pass [de-identified] : 07/01/19 [de-identified] : s/p laparoscopic Nat-en-Y gastric bypass, lysis of adhesions, repair of paraesophageal hernia.

## 2019-07-10 NOTE — PHYSICAL EXAM
[de-identified] : CTAB, no wheezing [de-identified] : sclera anicteric,  [de-identified] : RRR without m/r/g [de-identified] : abd is soft, NT/ND, incision sites healing well, no infection noted. no hernias noted

## 2019-07-10 NOTE — ASSESSMENT
[___ Days Post Op] : [unfilled] days [Previous Visit Weight: ___] : Previous visit weight: [unfilled] lbs [Today's BMI: ___] : Today's BMI: [unfilled] [Today's Weight: ___] : Today's weight:[unfilled] lbs [de-identified] : Post Operative:  53 y.o F s/p laparoscopic Nat-en-Y gastric bypass, lysis of adhesions, repair of paraesophageal hernia.\par \par Pre-op weight: 213 lbs\par Today's weight: 209 lbs \par EBW: 98\par total weight loss since surgery:  4 lbs \par % of Excess Body Weight Loss:  4% \par \par - reinforced constipation remedies which include adequate hydration, natural fiber from fruits and vegetables, fiber supplements, stool softeners, laxatives, smooth move teas. Also abdominal bloating remedies were discussed which include simethicone and avoiding carbonated beverages, gum chewing, and drinking through a straw. \par -reinforced adequate fluid intake \par -start daily vitamins - chewable tabs, Vitamin B12, VIt D 1000/daily, Calcium \par - continue with Omeprazole x 3 months \par - follow up in the office, 1 month ; 08/07/19\par - call with concerns \par -atigall at next visit\par   \par \par

## 2019-07-10 NOTE — HISTORY OF PRESENT ILLNESS
[Date of Surgery: ___] : Date of Surgery:   [unfilled] [___ Days Post Op] : [unfilled] days  [Pre-Op Weight ___] : Pre-op weight was [unfilled] lbs [de-identified] : She denies nausea/vomiting, denies abdominal pain, no acid reflux. Patient is taking Omeprazole, at home. She reports normal BM's. She has not started taking her daily vitamins yet. She reports drinking adequate amounts of fluids/daily. her urine is clear not dark. she is on all her preop medications as before. [Phase 3] : Phase 3 [Walking] : walking

## 2019-08-07 ENCOUNTER — APPOINTMENT (OUTPATIENT)
Dept: SURGERY | Facility: CLINIC | Age: 54
End: 2019-08-07
Payer: MEDICARE

## 2019-08-07 VITALS
SYSTOLIC BLOOD PRESSURE: 110 MMHG | DIASTOLIC BLOOD PRESSURE: 75 MMHG | TEMPERATURE: 98.2 F | WEIGHT: 197 LBS | HEIGHT: 63 IN | BODY MASS INDEX: 34.91 KG/M2 | HEART RATE: 68 BPM | OXYGEN SATURATION: 99 %

## 2019-08-07 PROCEDURE — 99024 POSTOP FOLLOW-UP VISIT: CPT

## 2019-08-14 NOTE — HISTORY OF PRESENT ILLNESS
[Procedure: ___] : Procedure performed: [unfilled]  [Date of Surgery: ___] : Date of Surgery:   [unfilled] [Surgeon Name:   ___] : Surgeon Name: Dr. SELLERS [Pre-Op Weight ___] : Pre-op weight was [unfilled] lbs [___ Months Post Op] : [unfilled] months [de-identified] : DEIDRA AVILA is a 53 year old female who present in the office for follow up visit s/p Laparoscopic Nat-en-Y gastric bypass, lysis of adhesions, repair of paraesophageal hernia 07/01/02019. Today Patient reports that she is unable to tolerate diet. Patient reports that she is vomiting after any food intake.\par Patient reports some changes in her smell. Patient denies any pain able to tolerate liquids. Patient was advised to start on soft diet. \par  \par

## 2019-08-14 NOTE — PHYSICAL EXAM
[Normal] : grossly intact [de-identified] : bilateral symmetric excursions, breathing even and unlabored  [de-identified] : Normoactive bowel sounds, soft and nontender, no hepatosplenomegaly or masses noted. Incisions healing well\par   [de-identified] : nml S1, S2, no m/r/g

## 2019-08-14 NOTE — ASSESSMENT
[FreeTextEntry1] : Pre-op weight: 213 lbs\par Today's weight: 197lbs \par EBW: 115lb\par total weight loss since surgery: 16 lbs \par % of Excess Body Weight Loss:  14%\par \par - reinforced constipation remedies which include adequate hydration, natural fiber from fruits and vegetables, fiber supplements, stool softeners, laxatives, smooth move teas. Also abdominal bloating remedies were discussed which include simethicone and avoiding carbonated beverages, gum chewing, and drinking through a straw. \par -reinforced adequate fluid intake \par - daily vitamins - chewable tabs, Vitamin B12, VIt D 1000/daily, Calcium \par - continue with Omeprazole x 3 months \par - follow up in the office,\par - call with concerns \par -atigall will start today \par  \par \par  \par Previous visit weight: 209 lbs \par Today's weight:197lbs \par Today's BMI: 34.9

## 2019-08-16 ENCOUNTER — OTHER (OUTPATIENT)
Age: 54
End: 2019-08-16

## 2019-09-18 ENCOUNTER — OTHER (OUTPATIENT)
Age: 54
End: 2019-09-18

## 2019-10-09 ENCOUNTER — APPOINTMENT (OUTPATIENT)
Dept: SURGERY | Facility: CLINIC | Age: 54
End: 2019-10-09

## 2019-10-23 ENCOUNTER — APPOINTMENT (OUTPATIENT)
Dept: SURGERY | Facility: CLINIC | Age: 54
End: 2019-10-23
Payer: MEDICARE

## 2019-10-23 VITALS
DIASTOLIC BLOOD PRESSURE: 82 MMHG | HEART RATE: 72 BPM | TEMPERATURE: 98 F | BODY MASS INDEX: 31.89 KG/M2 | HEIGHT: 63 IN | WEIGHT: 180 LBS | SYSTOLIC BLOOD PRESSURE: 133 MMHG

## 2019-10-23 DIAGNOSIS — R19.7 DIARRHEA, UNSPECIFIED: ICD-10-CM

## 2019-10-23 PROCEDURE — 99213 OFFICE O/P EST LOW 20 MIN: CPT

## 2019-10-23 NOTE — ASSESSMENT
[FreeTextEntry1] : DEIDRA AVILA is a 54 year old female who present in the office who present in the office  s/p Laparoscopic Nat-en-Y gastric bypass\par \par On oral medication and vitamins  Feels well \par Pre-op weight: 213 lbs\par Ideal body weight: 115 lbs\par Today's weight: 180 lbs\par Excess body weight: 98 lbs\par total weight loss since surgery: 33 lbs\par % of Excess Body Weight Loss: 30% which is on target. \par - Stay well hydrated.\par - Reinforced need for daily MVI, vit D, and vit B complex.\par - Reinforced need for adequate hydration (at least 64 oz daily) and adequate protein intake (at least 60 g daily)\par - Reinforced need to chew food properly before swallowing. \par - Instructed patient not to eat and drink at the same time and to space them out 30 minutes apart. \par - Instructed patient not to drink from a straw, chew gum, or drink carbonated beverages.\par - Informed patient about post-op support groups held every 3rd Tuesday of the month.\par - Please continue with  stage 4 solid food at this time. \par - Informed no restrictions with exercise at this time. \par - Actigall for 3 more month \par - Follow-up in 3 months\par - Call with concerns. \par - Follow-up blood work  today

## 2019-10-23 NOTE — HISTORY OF PRESENT ILLNESS
[de-identified] : DEIDRA AVILA is a 54 year old female who present in the office for a follow up s/p Laparoscopic Nat-en-Y gastric bypass, lysis of adhesions, repair of paraesophageal hernia 07/01/02019. Patient able to tolerate regular diet, compliant with medications and vitamins, drinks enough fluids. Patient stated that she exercise daily for 2 to 3 hrs. Patient denies any pain or discomfort at present time. Patient report that she has 3 to 4 lose bowel movement a day possibly due to excess artificial sweeteners. Patient was instructed to decrease artificial sweeteners intake. Patient verbalized the undersigning. Patient's questions and concerns addressed to patient's satisfaction.\par \par

## 2019-10-23 NOTE — PHYSICAL EXAM
[Overweight] : overweight  [Normal] : affect appropriate [de-identified] : Neck supple. Trachea midline. Thyroid isthmus barely palpable, lobes not felt.\par   [de-identified] : Breath sounds equal and bilateral, no wheezing no rales or rhonchi  [de-identified] :  good S1, S2, no m/r/g bilateral  [de-identified] : Normoactive bowel sounds, soft and nontender, no hepatosplenomegaly or masses noted, well healed scars, no hernias\par

## 2019-10-24 PROBLEM — R19.7 DIARRHEA: Status: ACTIVE | Noted: 2019-10-24

## 2019-10-24 LAB
25(OH)D3 SERPL-MCNC: 37 NG/ML
ALBUMIN SERPL ELPH-MCNC: 3.9 G/DL
ALP BLD-CCNC: 69 U/L
ALT SERPL-CCNC: 15 U/L
ANION GAP SERPL CALC-SCNC: 17 MMOL/L
AST SERPL-CCNC: 19 U/L
BASOPHILS # BLD AUTO: 0.04 K/UL
BASOPHILS NFR BLD AUTO: 0.2 %
BILIRUB SERPL-MCNC: 0.6 MG/DL
BUN SERPL-MCNC: 8 MG/DL
CALCIUM SERPL-MCNC: 9.7 MG/DL
CHLORIDE SERPL-SCNC: 104 MMOL/L
CHOLEST SERPL-MCNC: 148 MG/DL
CHOLEST/HDLC SERPL: 2.3 RATIO
CO2 SERPL-SCNC: 22 MMOL/L
CREAT SERPL-MCNC: 0.81 MG/DL
EOSINOPHIL # BLD AUTO: 0.03 K/UL
EOSINOPHIL NFR BLD AUTO: 0.2 %
ESTIMATED AVERAGE GLUCOSE: 105 MG/DL
FERRITIN SERPL-MCNC: 126 NG/ML
FOLATE SERPL-MCNC: 16.1 NG/ML
GLUCOSE SERPL-MCNC: 117 MG/DL
HBA1C MFR BLD HPLC: 5.3 %
HCT VFR BLD CALC: 43.3 %
HDLC SERPL-MCNC: 64 MG/DL
HGB BLD-MCNC: 13.9 G/DL
IMM GRANULOCYTES NFR BLD AUTO: 0.4 %
IRON SATN MFR SERPL: 16 %
IRON SERPL-MCNC: 40 UG/DL
LDLC SERPL CALC-MCNC: 61 MG/DL
LYMPHOCYTES # BLD AUTO: 1.39 K/UL
LYMPHOCYTES NFR BLD AUTO: 8 %
MAN DIFF?: NORMAL
MCHC RBC-ENTMCNC: 28.7 PG
MCHC RBC-ENTMCNC: 32.1 GM/DL
MCV RBC AUTO: 89.5 FL
MONOCYTES # BLD AUTO: 1.19 K/UL
MONOCYTES NFR BLD AUTO: 6.9 %
NEUTROPHILS # BLD AUTO: 14.56 K/UL
NEUTROPHILS NFR BLD AUTO: 84.3 %
PLATELET # BLD AUTO: 353 K/UL
POTASSIUM SERPL-SCNC: 3.6 MMOL/L
PROT SERPL-MCNC: 6.7 G/DL
RBC # BLD: 4.84 M/UL
RBC # FLD: 15.8 %
SODIUM SERPL-SCNC: 142 MMOL/L
TIBC SERPL-MCNC: 248 UG/DL
TRIGL SERPL-MCNC: 113 MG/DL
UIBC SERPL-MCNC: 208 UG/DL
VIT B12 SERPL-MCNC: >2000 PG/ML
WBC # FLD AUTO: 17.28 K/UL

## 2019-10-28 LAB — VIT B1 SERPL-MCNC: 174.9 NMOL/L

## 2020-01-29 ENCOUNTER — APPOINTMENT (OUTPATIENT)
Dept: SURGERY | Facility: CLINIC | Age: 55
End: 2020-01-29
Payer: MEDICARE

## 2020-01-29 VITALS
TEMPERATURE: 98 F | DIASTOLIC BLOOD PRESSURE: 81 MMHG | WEIGHT: 167 LBS | BODY MASS INDEX: 29.59 KG/M2 | HEIGHT: 63 IN | HEART RATE: 78 BPM | SYSTOLIC BLOOD PRESSURE: 129 MMHG

## 2020-01-29 LAB
25(OH)D3 SERPL-MCNC: 37.6 NG/ML
ALBUMIN SERPL ELPH-MCNC: 3.9 G/DL
ALP BLD-CCNC: 54 U/L
ALT SERPL-CCNC: 22 U/L
ANION GAP SERPL CALC-SCNC: 13 MMOL/L
AST SERPL-CCNC: 16 U/L
BASOPHILS # BLD AUTO: 0.04 K/UL
BASOPHILS NFR BLD AUTO: 0.5 %
BILIRUB SERPL-MCNC: 0.7 MG/DL
BUN SERPL-MCNC: 8 MG/DL
CALCIUM SERPL-MCNC: 9.6 MG/DL
CHLORIDE SERPL-SCNC: 108 MMOL/L
CHOLEST SERPL-MCNC: 143 MG/DL
CHOLEST/HDLC SERPL: 2.4 RATIO
CO2 SERPL-SCNC: 20 MMOL/L
CREAT SERPL-MCNC: 0.95 MG/DL
EOSINOPHIL # BLD AUTO: 0.17 K/UL
EOSINOPHIL NFR BLD AUTO: 2.3 %
FERRITIN SERPL-MCNC: 172 NG/ML
FOLATE SERPL-MCNC: 15.2 NG/ML
GLUCOSE SERPL-MCNC: 102 MG/DL
HCT VFR BLD CALC: 38.7 %
HDLC SERPL-MCNC: 61 MG/DL
HGB BLD-MCNC: 12.9 G/DL
IMM GRANULOCYTES NFR BLD AUTO: 0.5 %
IRON SATN MFR SERPL: 73 %
IRON SERPL-MCNC: 167 UG/DL
LDLC SERPL CALC-MCNC: 57 MG/DL
LYMPHOCYTES # BLD AUTO: 1.64 K/UL
LYMPHOCYTES NFR BLD AUTO: 22.5 %
MAN DIFF?: NORMAL
MCHC RBC-ENTMCNC: 29.1 PG
MCHC RBC-ENTMCNC: 33.3 GM/DL
MCV RBC AUTO: 87.2 FL
MONOCYTES # BLD AUTO: 0.51 K/UL
MONOCYTES NFR BLD AUTO: 7 %
NEUTROPHILS # BLD AUTO: 4.89 K/UL
NEUTROPHILS NFR BLD AUTO: 67.2 %
PLATELET # BLD AUTO: 235 K/UL
POTASSIUM SERPL-SCNC: 3.7 MMOL/L
PROT SERPL-MCNC: 6.2 G/DL
RBC # BLD: 4.44 M/UL
RBC # FLD: 14.6 %
SODIUM SERPL-SCNC: 142 MMOL/L
TIBC SERPL-MCNC: 227 UG/DL
TRIGL SERPL-MCNC: 129 MG/DL
UIBC SERPL-MCNC: 60 UG/DL
VIT B12 SERPL-MCNC: >2000 PG/ML
WBC # FLD AUTO: 7.29 K/UL

## 2020-01-29 PROCEDURE — 99213 OFFICE O/P EST LOW 20 MIN: CPT

## 2020-01-29 RX ORDER — METFORMIN HYDROCHLORIDE 500 MG/1
500 TABLET, FILM COATED, EXTENDED RELEASE ORAL
Refills: 0 | Status: DISCONTINUED | COMMUNITY
End: 2020-01-29

## 2020-01-29 RX ORDER — OMEPRAZOLE 20 MG/1
20 CAPSULE, DELAYED RELEASE ORAL DAILY
Qty: 30 | Refills: 1 | Status: DISCONTINUED | COMMUNITY
Start: 2019-08-07 | End: 2020-01-29

## 2020-01-29 RX ORDER — URSODIOL 300 MG/1
300 CAPSULE ORAL
Qty: 60 | Refills: 5 | Status: DISCONTINUED | COMMUNITY
Start: 2019-08-07 | End: 2020-01-29

## 2020-01-29 NOTE — HISTORY OF PRESENT ILLNESS
[Procedure: ___] : Procedure performed: [unfilled]  [Date of Surgery: ___] : Date of Surgery:   [unfilled] [Surgeon Name:   ___] : Surgeon Name: Dr. SELLERS [Pre-Op Weight ___] : Pre-op weight was [unfilled] lbs [de-identified] : DEIDRA AVILA is a 54 year old female who present in the office for a follow up s/p Laparoscopic Gastric Bypass  on 07/01/2019\par Patient able to tolerate regular diet, compliant with medications and vitamins  (MVI, Calcium and vitamin D, and vitamin B complex), drinks enough fluids (  64 oz daily). Patient stated that she exercise x days a week for 2 hrs. Patient denies any pain or discomfort at present time. Patient's questions and concerns addressed to patient's satisfaction. Patient has no recent ER visit post surgery\par

## 2020-01-29 NOTE — PHYSICAL EXAM
[Normal] : affect appropriate [Thin] : thin [de-identified] : Neck supple. Trachea midline. Thyroid isthmus barely palpable, lobes not felt.\par   [de-identified] : Breath sounds equal and bilateral, no wheezing no rales or rhonchi  [de-identified] :  good S1, S2, no m/r/g bilateral  [de-identified] : Normoactive bowel sounds, soft and nontender, no hepatosplenomegaly or masses noted, well healed scars, no hernias\par

## 2020-01-29 NOTE — ASSESSMENT
[FreeTextEntry1] : DEIDRA AVILA is a 54 year old female who present in the office who present in the office  s/p Laparoscopic Nat-en-Y gastric bypass\par \par On oral medication and vitamins  Feels well \par Pre-op weight: 213 lbs\par Ideal body weight: 115 lbs\par Today's weight:167 lbs\par Excess body weight: 98 lbs\par total weight loss since surgery: 46 lbs\par % of Excess Body Weight Loss: 47% which is on target. \par - Stay well hydrated.\par - Reinforced need for daily MVI, vit D, and vit B complex.\par - Reinforced need for adequate hydration (at least 64 oz daily) and adequate protein intake (at least 60 g daily)\par - Reinforced need to chew food properly before swallowing. \par - Instructed patient not to eat and drink at the same time and to space them out 30 minutes apart. \par - Instructed patient not to drink from a straw, chew gum, or drink carbonated beverages.\par - Informed patient about post-op support groups held every 3rd Tuesday of the month.\par - Please continue with  stage 4 solid food at this time. \par - - Actigall  will be stopped this visit \par - Follow-up in 3 months\par - Call with concerns. \par - Follow-up blood work  today

## 2020-02-05 LAB — VIT B1 SERPL-MCNC: 120.2 NMOL/L

## 2020-05-13 ENCOUNTER — APPOINTMENT (OUTPATIENT)
Dept: SURGERY | Facility: CLINIC | Age: 55
End: 2020-05-13
Payer: MEDICARE

## 2020-05-13 DIAGNOSIS — Z86.39 PERSONAL HISTORY OF OTHER ENDOCRINE, NUTRITIONAL AND METABOLIC DISEASE: ICD-10-CM

## 2020-05-13 DIAGNOSIS — Z87.898 PERSONAL HISTORY OF OTHER SPECIFIED CONDITIONS: ICD-10-CM

## 2020-05-13 PROCEDURE — 99212 OFFICE O/P EST SF 10 MIN: CPT

## 2020-05-13 NOTE — ASSESSMENT
[FreeTextEntry1] : DEIDRA AVILA is a 54 year old female who present in the office who present in the office s/p Laparoscopic Nat-en-Y gastric bypass\par \par On oral medication and vitamins Feels well \par Pre-op weight: 213 lbs\par Ideal body weight: 115 lbs\par Today's weight:\par Excess body weight: 98 lbs\par total weight loss since surgery: unknown --states her scale is broken\par % of Excess Body Weight Loss: \par \par due for bloodwork--ordered and she can come in without appointment\par f/u July

## 2020-05-13 NOTE — HISTORY OF PRESENT ILLNESS
[Verbal consent obtained from patient] : the patient, [unfilled] [de-identified] : 54F sp LRYGB presents for telephonic visit. She is unable to connect via telehealth for video and preferred by phone. She feels well without complaints. No nausea, vomiting, or pain. She is complaint with vitamins, water and protein intake. She exercises 3 x a week by walking and treadmill [Procedure: ___] : Procedure performed: [unfilled]  [Surgeon Name:   ___] : Surgeon Name: Dr. SELLERS

## 2021-01-06 NOTE — H&P PST ADULT - NSALCOHOLPROBLEMSRELYN_GEN_A_CORE_SD
Requested Prescriptions     Pending Prescriptions Disp Refills   • tiZANidine HCl 2 MG Oral Tab 30 tablet 1     Sig: Take 1-2 tablets (2-4 mg total) by mouth nightly.      Last fill was 12/10/20 30 1 refill  Last ov 1/4/21  Future OV 2/3/21 no

## 2021-09-27 ENCOUNTER — NON-APPOINTMENT (OUTPATIENT)
Age: 56
End: 2021-09-27

## 2021-11-03 ENCOUNTER — APPOINTMENT (OUTPATIENT)
Dept: SURGERY | Facility: CLINIC | Age: 56
End: 2021-11-03
Payer: MEDICARE

## 2021-11-03 VITALS
DIASTOLIC BLOOD PRESSURE: 84 MMHG | SYSTOLIC BLOOD PRESSURE: 119 MMHG | BODY MASS INDEX: 32.6 KG/M2 | WEIGHT: 184 LBS | HEART RATE: 84 BPM | HEIGHT: 63 IN | OXYGEN SATURATION: 96 %

## 2021-11-03 VITALS — TEMPERATURE: 98.6 F

## 2021-11-03 DIAGNOSIS — Z83.3 FAMILY HISTORY OF DIABETES MELLITUS: ICD-10-CM

## 2021-11-03 DIAGNOSIS — Z83.2 FAMILY HISTORY OF DISEASES OF THE BLOOD AND BLOOD-FORMING ORGANS AND CERTAIN DISORDERS INVOLVING THE IMMUNE MECHANISM: ICD-10-CM

## 2021-11-03 DIAGNOSIS — Z78.9 OTHER SPECIFIED HEALTH STATUS: ICD-10-CM

## 2021-11-03 DIAGNOSIS — Z86.711 PERSONAL HISTORY OF PULMONARY EMBOLISM: ICD-10-CM

## 2021-11-03 DIAGNOSIS — F20.9 SCHIZOPHRENIA, UNSPECIFIED: ICD-10-CM

## 2021-11-03 DIAGNOSIS — Z98.84 BARIATRIC SURGERY STATUS: ICD-10-CM

## 2021-11-03 DIAGNOSIS — D50.9 IRON DEFICIENCY ANEMIA, UNSPECIFIED: ICD-10-CM

## 2021-11-03 DIAGNOSIS — E78.00 PURE HYPERCHOLESTEROLEMIA, UNSPECIFIED: ICD-10-CM

## 2021-11-03 DIAGNOSIS — Z82.49 FAMILY HISTORY OF ISCHEMIC HEART DISEASE AND OTHER DISEASES OF THE CIRCULATORY SYSTEM: ICD-10-CM

## 2021-11-03 DIAGNOSIS — E55.9 VITAMIN D DEFICIENCY, UNSPECIFIED: ICD-10-CM

## 2021-11-03 DIAGNOSIS — Z56.0 UNEMPLOYMENT, UNSPECIFIED: ICD-10-CM

## 2021-11-03 DIAGNOSIS — I42.9 CARDIOMYOPATHY, UNSPECIFIED: ICD-10-CM

## 2021-11-03 PROCEDURE — 99213 OFFICE O/P EST LOW 20 MIN: CPT

## 2021-11-03 SDOH — ECONOMIC STABILITY - INCOME SECURITY: UNEMPLOYMENT, UNSPECIFIED: Z56.0

## 2021-11-03 NOTE — HISTORY OF PRESENT ILLNESS
[Procedure: ___] : Procedure performed: [unfilled]  [Date of Surgery: ___] : Date of Surgery:   [unfilled] [Surgeon Name:   ___] : Surgeon Name: Dr. SELLERS [Pre-Op Weight ___] : Pre-op weight was [unfilled] lbs [de-identified] : DEIDRA AVILA is a 56 year old female who present in the office for a follow up s/p Laparoscopic Gastric Bypass  on 07/01/2019  Patient able to tolerate regular diet, compliant with medications and vitamins  (MVI, Calcium and vitamin D, and vitamin B complex), drinks enough fluids ( 64 oz daily). Patient stated that she exercise every day, walk ing for 2 hr daily. Patient denies any pain or discomfort at present time. Patient's questions and concerns addressed to patient's satisfaction. Patient has no recent ER visit post surgery.\par

## 2021-11-03 NOTE — CONSULT LETTER
[Dear  ___] : Dear  [unfilled], [Courtesy Letter:] : I had the pleasure of seeing your patient, [unfilled], in my office today. [Consult Closing:] : Thank you very much for allowing me to participate in the care of this patient.  If you have any questions, please do not hesitate to contact me. [Sincerely,] : Sincerely, [FreeTextEntry3] : Dr Browne

## 2021-11-03 NOTE — PHYSICAL EXAM
[Normal] : affect appropriate [de-identified] : Neck supple. Trachea midline. Thyroid isthmus barely palpable, lobes not felt.\par   [de-identified] : Breath sounds equal and bilateral, no wheezing no rales or rhonchi  [de-identified] :  good S1, S2, no m/r/g bilateral  [de-identified] : Normoactive bowel sounds, soft and nontender, no hepatosplenomegaly or masses noted, well healed scars, no hernias\par

## 2021-11-03 NOTE — ASSESSMENT
[FreeTextEntry1] : DEIDRA AVILA is a 54 year old female who present in the office who present in the office  s/p Laparoscopic Nat-en-Y gastric bypass\par \par On oral medication and vitamins  Feels well \par Pre-op weight: 213 lbs\par Ideal body weight: 115 lbs\par Today's weight: 184 lbs\par Excess body weight: 98 lbs\par total weight loss since surgery: 29 lbs gained 17 lb since last visit \par % of Excess Body Weight Loss: 30 which is bellow target. \par - Stay well hydrated.\par - Reinforced need for daily MVI, vit D, and vit B complex.\par - Reinforced need for adequate hydration (at least 64 oz daily) and adequate protein intake (at least 60 g daily)\par - Reinforced need to chew food properly before swallowing. \par - Instructed patient not to eat and drink at the same time and to space them out 30 minutes apart. \par - Instructed patient not to drink from a straw, chew gum, or drink carbonated beverages.\par - Informed patient about post-op support groups held every 3rd Tuesday of the month.\par - Please continue with  stage 4 solid food at this time. \par - Informed no restrictions with exercise at this time. \par - Follow-up in November 2022\par - Call with concerns. \par - Follow-up blood work  today

## 2021-11-03 NOTE — REASON FOR VISIT
[Follow-Up Visit] : a follow-up visit for [S/P Bariatric Surgery] : s/p bariatric surgery [FreeTextEntry2] : Laparoscopic Gastric Bypass

## 2022-04-15 NOTE — ED BEHAVIORAL HEALTH ASSESSMENT NOTE - MEDICAL ISSUES AND PLAN (INCLUDE STANDING AND PRN MEDICATION)
[id number] [facility]  [address] [facility] [address]  Phone: [phone]  Fax: [phone] [facility] [place]      Pt Name: Francesca Capps  MRN: [id number]  [place] 1980  Date of evaluation: 4/15/22      CHIEF COMPLAINT:  Chief Complaint   Patient presents with    Abdominal Pain     nausea with recent pancreatitis       HISTORY OF PRESENT ILLNESS    Francesca Capps is a 38 y.o. female who presents with GI complaints:       Timing/Onset:   2-3 days  Context/Setting:   Pt here for IV fluids as concern for poor oral intake over the last 2-3 days. Nausea present but no significant emesis. Hx of chronic abd pain and fibromylagia. Had needle biopsy of pancreas that was negative for cancer or other acute process. She just had an MRCP 6 days that was negative for acute biliary process. She was found to have some fluid around biopsy area after this was completed but nothing noted on CT and MR study since that time. Pain is consistent with previous in character and location. She feels like she is getting behind on her hydration. She has Zofran at home but also taking small dose of Amitriptyline daily and has concerns about side effect profile so not taking Zofran at present. No other chest/resp/ symptoms reported. Dr Lexi Everett took out gall bladder in early March '22 and she follows with GI at [facility]. Per Chart Review GI thinks she may have some level of chronic pancreatitis based on imaging results. Quality: sharp   achy  Duration: constant    Modifying Factors:  As above  Severity: mild-moderate        Nursing Notes were reviewed. REVIEW OF SYSTEMS       Constitutional: per HPI  Eyes: No visual changes. Neck: No neck pain. Respiratory: Denies recent shortness of breath. Cardiac:  Denies recent chest pain or palpitations  GI:  Per HPI  : Per HPI    Musculoskeletal: per HPI   Neurologic: Denies headache or focal weakness. Skin:  Denies any rash. Negative in 10 essential Systems except as mentioned above and in the HPI. PAST MEDICAL HISTORY   PMH:  has a past medical history of Anxiety, Asthma, Cancer (HCC)-skin, Depression, Fibromyalgia, History of anemia, Hypertension, Myofascial pain, Pancreatitis, PONV (postoperative nausea and vomiting), and Thyroid nodule. Surgical History:  has a past surgical history that includes Skin cancer excision; Thyroidectomy (05/2021); Colonoscopy (02/2022); Upper gastrointestinal endoscopy (02/2022); Upper gastrointestinal endoscopy (N/A, 3/10/2022); Upper gastrointestinal endoscopy (3/10/2022); skin biopsy; Endoscopy, colon, diagnostic; Cholecystectomy, laparoscopic (03/18/2022); and Cholecystectomy, laparoscopic (N/A, 3/18/2022). Social History:  reports that she has never smoked. She has never used smokeless tobacco. She reports previous alcohol use. She reports previous drug use. Drug: Marijuana Patricia Enrique). Family History: None  Psychiatric History: None    Allergies:is allergic to penicillins and sudafed [pseudoephedrine hcl]. PHYSICAL EXAM     INITIAL VITALS: /89   Pulse 97   Temp 98.4 °F (36.9 °C) (Oral)   Resp 18   LMP 03/24/2022 (Approximate)   SpO2 100%   Constitutional:  Well developed, no distress. Well-appearing. Eyes:  Pupils equal/round  HENT:  Atraumatic, external ears normal, nose normal, oropharynx moist.   Respiratory:  Clear to auscultation bilaterally with good air exchange, no W/R/R  Cardiovascular:  RRR with normal S1 and S2  Gastrointestinal/Abdomen:  Soft, subtle LUQ/epigastric TTP w/o guarding., BS present. No RLQ TTP. No mass appreciated. Musculoskeletal:  Normal to inspection  Back:  No midline/paraspinal TTP. No bilat CVA/flank TTP. Integument:  No rash.     Neurologic:  Alert & age appropriate mentation-interaction      DIAGNOSTIC RESULTS     EKG: All EKG's are interpreted by the Emergency Department Physician who either signs or Co-signs this chart in the absence of a cardiologist.    Not indicated    RADIOLOGY:   Reviewed the radiologist:  No orders to display     Not indicated    LABS:  Labs Reviewed - No data to display      [address] [facility], DDX and MDM:     IVF fluids ordered as this is her primary concern she voices. I recommended taking Zofran with her Amitriptyline but watch for any concerning signs of Seratonin Syndrome or heart palpitations. She inquired about possible an US to see if new inflammation/seroma at site of March '22 biopsy but just had MR study 6 days ago that found pancreas to be unremarkable. No changed in character, severity or location of her pain from previousl    1449  IVF completed and Zofran provided. She confirms she has both ODT and oral Zofran at home to use. She has appt with Pancreatic Specialist on Monday. I have reviewed the disposition diagnosis with the patient and or their family/guardian. I have answered their questions and given discharge instructions. They voiced understanding of these instructions and did not have any further questions or complaints. We estimate there is LOW risk for ACUTE APPENDICITIS, BOWEL OBSTRUCTION, CHOLECYSTITIS, DIVERTICULITIS, INCARCERATED HERNIA, or PERFORATED BOWEL or ULCER, thus we consider the discharge disposition reasonable. Re-evaluation of the abdomen is benign. No guarding, peritoneal signs or significant tenderness noted. Also, there is no evidence or peritonitis, sepsis, or toxicity. The patient and/or family and I have discussed the diagnosis and risks, and we agree with discharging home to follow-up with their primary doctor. The patient presents with abdominal pain without signs of peritonitis or other life-threatening or serious etiology. The patient appears stable for discharge and has been instructed to return immediately if the symptoms worsen in any way, or in 8-12 hr if not improved for re-evaluation.   We also discussed returning to the Emergency Department immediately if new or worsening symptoms occur. We have discussed the symptoms which are most concerning (e.g., bloody stool, fever, changing or worsening pain, persistent vomiting, chest pain shortness of breath, numbness or weakness to the arms or legs, coolness or color change to the arms or legs) that necessitate immediate return. The patient understands that at this time there is no evidence for a more malignant underlying process, but the patient also understands that early in the process of an illness or injury, an emergency department workup can be falsely reassuring. Routine discharge counseling was given, and the patient understands that worsening, changing or persistent symptoms should prompt an immediate call or follow up with their primary physician or return to the emergency department. The importance of appropriate follow up was also discussed. We have reviewed the disposition diagnosis with the patient and or their family/guardian. We have answered their questions and given discharge instructions. They voiced understanding of these instructions and did not have any further questions or complaints. Orders Placed This Encounter   Medications    0.9 % sodium chloride bolus    ondansetron (ZOFRAN) injection 4 mg       CONSULTS:  None      FINAL IMPRESSION      1. Nausea    2. Abdominal pain, left upper quadrant          DISPOSITION/PLAN:  DISPOSITION Decision To Discharge 04/15/2022 02:47:12 PM        PATIENT REFERRED TO:  Timothy Gordon MD  [address] [facility]  [phone]    Schedule an appointment as soon as possible for a visit in 3 days  for re-evaluation of your symptoms    [facility]  [address]   [place] [id number] [phone]  Go to   for worsening of symptoms, inability to stay hydrated with vomiting/diarrhea      DISCHARGE MEDICATIONS:  New Prescriptions    No medications on file       (Please note that portions of this note were completed with a voice recognition program.  Efforts were made to edit the dictations but occasionally words are mis-transcribed.)    DOMENIC Mcdermott PA-C  04/15/22 [id number] UTI - per medical attending Jovi Keflex 500mg po BID x 3 days

## 2022-07-18 ENCOUNTER — EMERGENCY (EMERGENCY)
Facility: HOSPITAL | Age: 57
LOS: 1 days | Discharge: HOME | End: 2022-07-18
Attending: EMERGENCY MEDICINE | Admitting: EMERGENCY MEDICINE

## 2022-07-18 VITALS
HEART RATE: 77 BPM | HEIGHT: 63 IN | TEMPERATURE: 97 F | SYSTOLIC BLOOD PRESSURE: 143 MMHG | RESPIRATION RATE: 18 BRPM | DIASTOLIC BLOOD PRESSURE: 79 MMHG | OXYGEN SATURATION: 95 %

## 2022-07-18 DIAGNOSIS — Z95.828 PRESENCE OF OTHER VASCULAR IMPLANTS AND GRAFTS: Chronic | ICD-10-CM

## 2022-07-18 PROCEDURE — 99283 EMERGENCY DEPT VISIT LOW MDM: CPT

## 2022-07-18 RX ADMIN — Medication 40 MILLIGRAM(S): at 16:30

## 2022-07-18 NOTE — ED PROVIDER NOTE - PATIENT PORTAL LINK FT
You can access the FollowMyHealth Patient Portal offered by [facility] by registering at the following website: [email or web address]. By joining [employer]’s FollowMyHealth portal, you will also be able to view your health information using other applications (apps) compatible with our system.

## 2022-07-18 NOTE — ED PROVIDER NOTE - OBJECTIVE STATEMENT
Patient is a 56y female with a pmhx of Diabetes, HTN and Schizophrenia complaining of left eye swelling. Patient states she was use hair dye last night and promptly took a shower. She believes the hair dye washed down into her eye. However, at the time of the shower she had no irritation, no change in vision or swelling. Upon waking this morning, she noticed significant left eye swelling. She denies any blurry vision, changes in vison/loss of vision, headaches, discharge, fever or chills. Patient took benadryl earlier in the day with no change in symptoms. Patient has used hair dye in the past and denies any similar reactions.

## 2022-07-18 NOTE — ED PROVIDER NOTE - CLINICAL SUMMARY MEDICAL DECISION MAKING FREE TEXT BOX
Patient is 56y female who presents to the ED for left eye swelling. Patient states she was using hair dye last night and took a shower promptly after. Upon waking up, she noticed significant eye swelling with no changes in vision or pain. Physical exam showed no change in vision, no conjunctivitis, and no corneal abrasion on fluorescent lamp testing. Patient denies any fever or chills. She will be given a dosage of steroids to help decrease inflammation.

## 2022-07-18 NOTE — ED PROVIDER NOTE - ATTENDING CONTRIBUTION TO CARE
Pt w/ medical history as per chart p/w isolated L periorbital swelling, worse in infraorbital area. Pt used a hair dye around 4pm (not sure of brand but states she has used it many times before) and then when it was washing out she thinks some of it may have gotten in her eye. She never had any pain or burning to eyes or skin, around 8pm noticed some swelling around L eye and then this morning was worse. Took a benadryl at 8pm with minimal relief and came to ED. Denies vision changes, pain to eye or pain with eye movement, drainage from eye, fevers, rashes, itchiness, difficulty swallowing or breathing, cp. Exam is notable for a well appearing middle aged woman, EOMI, PERRL, no injected conjunctiva, no corneal abrasion, no drainage from eye, no decreased vision, no rashes, no periorbital erythema only edema to periorbital area. Suspect irritation from dye, no c/f preseptal or septal cellulitis. Will give dose of prednisone to decrease inflammation and return to ED precautions, pt comfortable with discharge.

## 2022-07-18 NOTE — ED ADULT TRIAGE NOTE - CHIEF COMPLAINT QUOTE
pt c/o left eye swelling since last night.  pt dyed her hair and ever since then she has swelling to eye.

## 2022-07-18 NOTE — ED PROVIDER NOTE - NSFOLLOWUPINSTRUCTIONS_ED_ALL_ED_FT
You were seen in the ED today for left eye swelling. In the ED you were given a steroid medication to decrease the swelling around the eye. Please take benadryl at home (follow instructions on the bottle) as needed. If you have any of the following symptoms or if your symptoms worsen, please return to the ED   - Difficulty seeing/ change in vision   - Swelling has increased   - Shortness of breath   - Eye discharge   - Fever or chills   - Redness of the eye   - Significant headaches  1. TAKE ALL MEDICATIONS AS DIRECTED.  FOR PAIN YOU CAN TAKE ACETAMINOPHEN (TYLENOL) AS NEEDED, AS DIRECTED ON PACKAGING.  2. FOLLOW UP WITH PCP AS DIRECTED.  YOU WERE GIVEN COPIES OF ALL LABS AND IMAGING RESULTS FROM YOUR ER VISIT--PLEASE TAKE THEM WITH YOU TO YOUR APPOINTMENT.  3. IF NEEDED, CALL [phone] TO FIND A PRIMARY CARE PHYSICIAN.  OR CALL [phone] TO MAKE AN APPOINTMENT WITH THE MEDICAL CLINIC.  4. RETURN TO THE ER FOR ANY WORSENING SYMPTOMS.

## 2023-01-01 NOTE — DISCHARGE NOTE PROVIDER - NSDCCPTREATMENT_GEN_ALL_CORE_FT
PRINCIPAL PROCEDURE  Procedure: Laparoscopic gastric bypass with Nat-en-Y gastroenterostomy  Findings and Treatment:       SECONDARY PROCEDURE  Procedure: Lysis of adhesions of peritoneum  Findings and Treatment:
36

## 2024-04-17 ENCOUNTER — OUTPATIENT (OUTPATIENT)
Dept: OUTPATIENT SERVICES | Facility: HOSPITAL | Age: 59
LOS: 1 days | Discharge: STILL ADMITTED OR ADMINISTRATIVE | End: 2024-04-17
Payer: COMMERCIAL

## 2024-04-17 ENCOUNTER — INPATIENT (INPATIENT)
Facility: HOSPITAL | Age: 59
LOS: 15 days | Discharge: HOME | End: 2024-05-03
Attending: PSYCHIATRY & NEUROLOGY | Admitting: PSYCHIATRY & NEUROLOGY
Payer: MEDICARE

## 2024-04-17 VITALS — HEIGHT: 63 IN | WEIGHT: 186.07 LBS | TEMPERATURE: 98 F | OXYGEN SATURATION: 98 % | RESPIRATION RATE: 18 BRPM

## 2024-04-17 DIAGNOSIS — F20.9 SCHIZOPHRENIA, UNSPECIFIED: ICD-10-CM

## 2024-04-17 DIAGNOSIS — Z95.828 PRESENCE OF OTHER VASCULAR IMPLANTS AND GRAFTS: Chronic | ICD-10-CM

## 2024-04-17 LAB
FLUAV AG NPH QL: SIGNIFICANT CHANGE UP
FLUBV AG NPH QL: SIGNIFICANT CHANGE UP
RSV RNA NPH QL NAA+NON-PROBE: SIGNIFICANT CHANGE UP
SARS-COV-2 RNA SPEC QL NAA+PROBE: SIGNIFICANT CHANGE UP

## 2024-04-17 PROCEDURE — 90839 PSYTX CRISIS INITIAL 60 MIN: CPT

## 2024-04-17 RX ORDER — OXYBUTYNIN CHLORIDE 5 MG
5 TABLET ORAL
Refills: 0 | Status: DISCONTINUED | OUTPATIENT
Start: 2024-04-17 | End: 2024-05-03

## 2024-04-17 RX ORDER — METFORMIN HYDROCHLORIDE 850 MG/1
500 TABLET ORAL DAILY
Refills: 0 | Status: DISCONTINUED | OUTPATIENT
Start: 2024-04-17 | End: 2024-05-03

## 2024-04-17 RX ORDER — CLONAZEPAM 1 MG
0.5 TABLET ORAL
Refills: 0 | Status: DISCONTINUED | OUTPATIENT
Start: 2024-04-17 | End: 2024-04-24

## 2024-04-17 RX ORDER — ZIPRASIDONE HYDROCHLORIDE 20 MG/1
60 CAPSULE ORAL
Refills: 0 | Status: DISCONTINUED | OUTPATIENT
Start: 2024-04-17 | End: 2024-05-03

## 2024-04-17 RX ORDER — HALOPERIDOL DECANOATE 100 MG/ML
5 INJECTION INTRAMUSCULAR EVERY 6 HOURS
Refills: 0 | Status: DISCONTINUED | OUTPATIENT
Start: 2024-04-17 | End: 2024-05-03

## 2024-04-17 RX ORDER — DIPHENHYDRAMINE HCL 50 MG
50 CAPSULE ORAL ONCE
Refills: 0 | Status: DISCONTINUED | OUTPATIENT
Start: 2024-04-17 | End: 2024-05-03

## 2024-04-17 RX ORDER — METFORMIN HYDROCHLORIDE 850 MG/1
500 TABLET ORAL DAILY
Refills: 0 | Status: DISCONTINUED | OUTPATIENT
Start: 2024-04-17 | End: 2024-04-17

## 2024-04-17 RX ORDER — DIPHENHYDRAMINE HCL 50 MG
50 CAPSULE ORAL EVERY 6 HOURS
Refills: 0 | Status: DISCONTINUED | OUTPATIENT
Start: 2024-04-17 | End: 2024-05-03

## 2024-04-17 RX ORDER — ZIPRASIDONE HYDROCHLORIDE 20 MG/1
100 CAPSULE ORAL
Refills: 0 | Status: DISCONTINUED | OUTPATIENT
Start: 2024-04-17 | End: 2024-05-03

## 2024-04-17 RX ORDER — HALOPERIDOL DECANOATE 100 MG/ML
5 INJECTION INTRAMUSCULAR ONCE
Refills: 0 | Status: DISCONTINUED | OUTPATIENT
Start: 2024-04-17 | End: 2024-05-03

## 2024-04-17 RX ORDER — HYDROXYZINE HCL 10 MG
50 TABLET ORAL AT BEDTIME
Refills: 0 | Status: DISCONTINUED | OUTPATIENT
Start: 2024-04-17 | End: 2024-05-03

## 2024-04-17 RX ORDER — ATORVASTATIN CALCIUM 80 MG/1
10 TABLET, FILM COATED ORAL AT BEDTIME
Refills: 0 | Status: DISCONTINUED | OUTPATIENT
Start: 2024-04-17 | End: 2024-05-03

## 2024-04-17 RX ADMIN — ZIPRASIDONE HYDROCHLORIDE 100 MILLIGRAM(S): 20 CAPSULE ORAL at 20:33

## 2024-04-17 RX ADMIN — Medication 50 MILLIGRAM(S): at 20:33

## 2024-04-17 RX ADMIN — Medication 0.5 MILLIGRAM(S): at 20:32

## 2024-04-17 RX ADMIN — ATORVASTATIN CALCIUM 10 MILLIGRAM(S): 80 TABLET, FILM COATED ORAL at 20:33

## 2024-04-17 RX ADMIN — Medication 5 MILLIGRAM(S): at 20:33

## 2024-04-17 NOTE — BH PATIENT PROFILE - HOME MEDICATIONS
Levsin SL 0.125 mg sublingual tablet , 1 tab(s) sublingually every 6 hours   omeprazole 40 mg oral delayed release capsule , 1 cap(s) orally once a day please open capsule   Percocet 5/325 oral tablet , 1 tab(s) orally every 6 hours MDD:4 tabs  oxybutynin 5 mg oral tablet , 1 tab(s) orally 2 times a day  loratadine 10 mg oral tablet , 1 tab(s) orally once a day  ferrous sulfate 325 mg (65 mg elemental iron) oral tablet , 1 tab(s) orally 2 times a day  ziprasidone 80 mg oral capsule , 1 cap(s) orally once a day with dinner  pravastatin 20 mg oral tablet , 1 tab(s) orally once a day (at bedtime)  metFORMIN 500 mg oral tablet , 1 tab(s) orally once a day (at bedtime)

## 2024-04-17 NOTE — CHART NOTE - NSCHARTNOTEFT_GEN_A_CORE
Screening Medical Evaluation    Patient Admitted from: [facility] admitting diagnosis: Schizophrenia    PAST MEDICAL & SURGICAL HISTORY:  Schizophrenia  Sickle cell anemia  PE (pulmonary embolism)  Hyperlipidemia  Diabetes Mellitus   Anemia  S/P IVC filter    ALLERGIES  No Known Allergies    Intolerances    SOCIAL HISTORY:  Patient denies current use of tobacco/nicotine, alcohol, or other substances    FAMILY HISTORY:  Family history of heart disease  Family history of hypertension  Family history of diabetes mellitus    MEDICATIONS  (STANDING):  atorvastatin 10 milliGRAM(s) Oral at bedtime  clonazePAM  Tablet 0.5 milliGRAM(s) Oral two times a day  hydrOXYzine hydrochloride 50 milliGRAM(s) Oral at bedtime  metFORMIN 500 milliGRAM(s) Oral daily  oxybutynin 5 milliGRAM(s) Oral two times a day  ziprasidone 100 milliGRAM(s) Oral <User Schedule>  ziprasidone 60 milliGRAM(s) Oral <User Schedule>    MEDICATIONS  (PRN):  diphenhydrAMINE 50 milliGRAM(s) Oral every 6 hours PRN agiation  diphenhydrAMINE Injectable 50 milliGRAM(s) IntraMuscular once PRN agiation  haloperidol     Tablet 5 milliGRAM(s) Oral every 6 hours PRN agitation  haloperidol    Injectable 5 milliGRAM(s) IntraMuscular once PRN acute agiation  LORazepam     Tablet 2 milliGRAM(s) Oral every 6 hours PRN agitation  LORazepam   Injectable 2 milliGRAM(s) IntraMuscular once PRN agitation      Vital Signs Last 24 Hrs  T(C): 36.8 (17 Apr 2024 17:54), Max: 36.8   T(F): 98.3 (17 Apr 2024 17:54), Max: 98.3   HR: --103 (17 Apr 2024 17:54)  BP: --139/91 (17 Apr 2024 17:54)  BP(mean): --  RR: 18 (17 Apr 2024 17:54) (18 - 18)  SpO2: 98% (17 Apr 2024 17:54) (98% - 98%)    Parameters below as of 17 Apr 2024 17:54  Patient On (Oxygen Delivery Method): room air    PHYSICAL EXAM:  GENERAL: NAD, well-developed  HEAD:  Atraumatic, Normocephalic  EYES: EOMI, PERRLA, conjunctiva and sclera clear  NECK: Supple, No JVD  CHEST/LUNG: Clear to auscultation bilaterally; No wheeze  HEART: Regular rate and rhythm; No murmurs, rubs, or gallops  ABDOMEN: Soft, Nontender, Nondistended; Bowel sounds present  EXTREMITIES:  2+ Peripheral Pulses, No clubbing, cyanosis, or edema  PSYCH: AAOx3  NEUROLOGY: non-focal  SKIN: No rashes or lesions      Assessment and Plan:  58F admitted to [facility] for Schizophrenia w/ PMHx of Sickle cell anemia, PE (pulmonary embolism), Hyperlipidemia, Anemia, S/P IVC filter  seen at bedside for medical screening evaluation. Patient has no acute medical complaints at this time. Patient denies fever, chills, headache, dizziness, lightheadedness, N/V, SOB, cough, congestion, chest pain, abdominal pain, dysuria, hematuria, diarrhea, constipation. Physical exam unremarkable, VSS. Admission Labs pending. F/u EKG in AM.     1.) Schizophrenia: Refer to primary team documentation for recs  2.) Diabetes Mellitus: 500mg daily  3.) Hyperlipidemia: Atorvastatin 10mg daily (at home, pravastatin 40mg daily)

## 2024-04-17 NOTE — BH CHART NOTE - NSEVENTNOTEFT_PSY_ALL_CORE
HPI: per  [facility]:   "Pt is a 59yo F, domiciled in [place], with 2 daughters, grandson, sister, and brother-in-law, unemployed on disability, PPHx dx of schizophrenia, hx of 3 prior psychiatric hospitalizations (most recent in 2017 at [facility]), currently in outpatient treatment with Dr. Ani Rainey ([phone]), no hx of SA/NSSI, no hx of violence/aggression, no substance misuse, no access to firearms, who presents to [facility] 4/17/23 referred by psychiatrist and accompanies by daughter for worsening auditory hallucinations.    Pt reports she has been experiencing worsening AH that are worsening in frequency and intensity. States she is almost constantly bothered by voices in the apartment next door (though no one lives there) that tell her to they are "doing Anabaptism" on her and are making her sick. States the voices are upsetting and keep her up at night. Also reports she has not been sleeping well due to constant voices and often feeling another body in bed next to her (sees a coat go up and down as if someone is breathing underneath it). This has been frightening to her. States she is getting only a few hours of sleep per night and feels exhausted during the day. She has been very preoccupied with the voices and has not been eating well. Mood has been low.    She has been on ziprasidone since d/c from [facility] in 2017. About 1 month ago, she was started on Vraylar as well to target the above sxs. 5 days ago dose was increased from 4.5 to 6mg qhs. Pt denies improvement in sxs since starting the Vraylar.    No manic sxs observed or reported. Denies SI. Denies HI.    Collateral obtained from daughter Karissa Bauer: She has observed her mom doing worse over the past month. States pt frequently appears distressed due to voices and is having a hard time dealing with it. She is not eating well, despite encouragement from daughter. Daughter reports that she has seen a similar pattern in the past which ultimately lead to hospitalization and she is trying to get her mom help before things become much worse. Current stressors are interpersonal conflict with pt's sister (with whom pt lives). She is concerned about her mother's well being. "    Patient evaluated by KYLE, confirms the above findings. On assessment, patient continues to endorse persistent worsening auditory hallucinations making threatening/hurtful comments. Also reports being fearful of her neighbors at home, worried they are talking negatively about her. Denies command hallucinations. Denies current or past SI. Endorses 10y of poor sleep, but recently worsening and was unable to sleep last night.     PPH: see HPI    PMH: DM2, HLD, hx of PE. During most recent hospitalization in 2017, had positive syphilis screen with recommendation to be treated on discharge.    Medications:   -ziprasidone 60mg qAM / 100mg qhs  -clonazepam 0.5mg BID  -hydroxyzine 50mg qhs  -Vraylar 6mg qd  -pravastatin 40mg qd  -metformin 500mg qd  -trospium 20mg BID    Allergies: denies    Substance: denies    Family Hx: none known    Social Hx: born in [place]    Access to weapons/guns: no    Vitals:        MSE:  Appearance: appears stated age, dressed casually, well groomed. Behavior: cooperative, appropriate eye contact, in good behavioral control. Motor: no PMR/PMA. No abnormal movements including tremor. Speech: no increased latency, normal rate, tone, volume. TP: linear, goal-directed. TC: future-oriented. Denies SI/HI/I/P, no urges for self-harm. No apparent delusions. Denies AH/VH. Mood: "Fine." Affect: Dysphoric, reactive, mood congruent, appropriate. Cognition: alert, oriented to person, place, month and year. Fund of knowledge: intact. Attention/Concentration: intact. Insight: fair. Judgment: fair. Impulse control: fair.    Labs:      Risk Assessment: The patient is at an elevated imminent risk of suicide, and an elevated chronic risk of suicide. Pt is not at an elevated imminent risk of harm to others.     Modifiable risk factors include: current SI/I/P, hopelessness/helplessness, recent suicide attempt, NSSIB, access to means, severe anxiety, insomnia, agitation, impulsivity, active psychosis, active mood episode, active substance use, acute psychosocial stressors, poor reactivity to stressors, difficulty expressing emotions, low frustration tolerance, experiencing homelessness, social isolation, noncompliant with treatment/not receiving treatment, , limited insight into symptoms, academic/occupational decline, absence of outpatient follow-up, poor social supports, recent inpatient discharge, recent loss, unable to care for self secondary to psychiatric illness.    Static risk factors for suicide include: h/o prior psychiatric admissions, diagnosis of XXX d/o, prior suicide attempts, h/o NSSIB, family history of suicidality, h/o trauma/abuse, chronic pain.   Protective factors include: Young, healthy, denies SI/I/P, no history of suicide attempts, no history of NSSIB, identifies reasons for living, fear of death/dying due to pain/suffering, future oriented, no prior psychiatric admissions, no active substance use, no active psychosis, engaged in work or school, dependent children, stable housing, intact marriage, strong social supports, high spirituality, positive therapeutic relationship, engaged in treatment, ability to cope with stress, high frustration tolerance, medication/follow up compliance, help-seeking behaviors, no legal history, adequate outpatient follow up with motivation to participate in care.    Immediate risk is minimized by inpatient admission to safe environment with appropriate supervision and limited access to lethal means. Future risk to be minimized by treatment of acute psychiatric symptoms, maximizing outpatient supports, providing patient education, discussing emergency procedures, and ensuring close follow-up.    Assessment/Plan:  Pt is a 59yo F, domiciled in [place], with 2 daughters, grandson, sister, and brother-in-law, unemployed on disability, PPHx dx of schizophrenia, hx of 3 prior psychiatric hospitalizations (most recent in 2017 at [facility]), currently in outpatient treatment with Dr. Ani Rainey ([phone]), no hx of SA/NSSI, no hx of violence/aggression, no substance misuse, no access to firearms, who presents to [facility] 4/17/23 referred by psychiatrist and accompanies by daughter for worsening auditory hallucinations.    Pt presents with about one month of with worsening AH, that have been increasing in intensity and frequency and have been interfering with her functioning. She has not been sleeping or eating well, and has been feeling depressed. She is agreeable to voluntary hospitalization for safety and stabilization given degree of symptomatology and poor functioning.      Plan:  1. Legals: admit to [facility] on 9.13 (voluntary)  2. Safety: routine observation, denies SI/HI/I/P on the unit.   3. Psychiatry: defer med optimization to primary team. Will continue home meds for now:    -PRNs for agitation: Haldol 5mg/ Ativan 2mg/ Benadryl 50mg q6 PO/IM  4. Group, milieu, individual therapy as appropriate.  5. Medical: no acute medical issues.  6. Dispo: pending clinical improvement.  Patient continues to require inpatient hospitalization for stabilization and safety.    Alethea Carlin, PGY-1 HPI: per  [facility] assessment:   "Pt is a 57yo F, domiciled in [place], with 2 daughters, grandson, sister, and brother-in-law, unemployed on disability, PPHx dx of schizophrenia, hx of 3 prior psychiatric hospitalizations (most recent in 2017 at [facility]), currently in outpatient treatment with Dr. Ani Rainey ([phone]), no hx of SA/NSSI, no hx of violence/aggression, no substance misuse, no access to firearms, who presents to [facility] 4/17/23 referred by psychiatrist and accompanies by daughter for worsening auditory hallucinations.    Pt reports she has been experiencing worsening AH that are worsening in frequency and intensity. States she is almost constantly bothered by voices in the apartment next door (though no one lives there) that tell her to they are "doing Orthodox" on her and are making her sick. States the voices are upsetting and keep her up at night. Also reports she has not been sleeping well due to constant voices and often feeling another body in bed next to her (sees a coat go up and down as if someone is breathing underneath it). This has been frightening to her. States she is getting only a few hours of sleep per night and feels exhausted during the day. She has been very preoccupied with the voices and has not been eating well. Mood has been low.    She has been on ziprasidone since d/c from [facility] in 2017. About 1 month ago, she was started on Vraylar as well to target the above sxs. 5 days ago dose was increased from 4.5 to 6mg qhs. Pt denies improvement in sxs since starting the Vraylar.    No manic sxs observed or reported. Denies SI. Denies HI.    Collateral obtained from daughter Karissa Bauer: She has observed her mom doing worse over the past month. States pt frequently appears distressed due to voices and is having a hard time dealing with it. She is not eating well, despite encouragement from daughter. Daughter reports that she has seen a similar pattern in the past which ultimately lead to hospitalization and she is trying to get her mom help before things become much worse. Current stressors are interpersonal conflict with pt's sister (with whom pt lives). She is concerned about her mother's well being. "    Patient evaluated by KYLE, confirms the above findings. On assessment, patient continues to endorse persistent worsening auditory hallucinations making threatening/hurtful comments. Also reports being fearful of her neighbors at home, worried they are talking negatively about her. Denies command hallucinations. Denies current or past SI. Endorses 10y of poor sleep, but recently worsening and was unable to sleep last night.     PPH: see HPI    PMH: DM2, HLD, hx of PE. During most recent hospitalization in 2017, had positive syphilis screen with recommendation to be treated on discharge.    Medications:   -ziprasidone 60mg qAM / 100mg qhs  -clonazepam 0.5mg BID  -hydroxyzine 50mg qhs  -Vraylar 6mg qd  -pravastatin 40mg qd  -metformin 500mg qd  -trospium 20mg BID    Allergies: denies    Substance: denies    Family Hx: none known    Social Hx: born in [place]    Access to weapons/guns: no    Vital Signs Last 24 Hrs  T(C): 36.8 (17 Apr 2024 17:54), Max: 36.8 (17 Apr 2024 17:54)  T(F): 98.3 (17 Apr 2024 17:54), Max: 98.3 (17 Apr 2024 17:54)  HR: --  BP: --  BP(mean): --  RR: 18 (17 Apr 2024 17:54) (18 - 18)  SpO2: 98% (17 Apr 2024 17:54) (98% - 98%)    MSE:  Appearance: appears stated age, dressed casually, well groomed. Behavior: cooperative, appropriate eye contact, in good behavioral control. Motor: no PMR/PMA. No abnormal movements including tremor. Speech: no increased latency, normal rate, tone, volume. TP: linear, goal-directed. TC: Denies SI/HI/I/P, no urges for self-harm. Described fear of neighbors talking about her. Endorses AH. Mood: "scared" Affect: anxious, reactive, mood congruent, appropriate. Cognition: alert, oriented to person, place, situation. Fund of knowledge: intact. Attention/Concentration: intact. Insight: fair. Judgment: fair. Impulse control: fair.    Labs:  COVID neg    Risk Assessment:   Unmodifiable risk factors: underlying dx, hx of 3 prior psychiatric hospitalizations.  Modifiable risk factors: current sxs (paranoia, AH)  Protective factors: stable domicile, social supports, help seeking, motivated to participate in treatment, no hx of SA/NSSI, no substance misuse.  Pt is not considered to be at an acute risk to self or others that would meet criteria for involuntary hospitalization. However, she would benefit from voluntary hospitalization for safety and stabilization given degree of symptomatology and poor functioning. She is agreeable to voluntary hospitalization.   Pt is a 57yo F, domiciled in [place], with 2 daughters, grandson, sister, and brother-in-law, unemployed on disability, PPHx dx of schizophrenia, hx of 3 prior psychiatric hospitalizations (most recent in 2017 at [facility]), currently in outpatient treatment with Dr. Ani Rainey ([phone]), no hx of SA/NSSI, no hx of       Plan:  1. Legals: admit to 6 on 9.13 (voluntary)  2. Safety: routine observation, denies SI/HI/I/P on the unit.   3. Psychiatry: defer med optimization to primary team. Will continue home meds for now:    -PRNs for agitation: Haldol 5mg/ Ativan 2mg/ Benadryl 50mg q6 PO/IM  4. Group, milieu, individual therapy as appropriate.  5. Medical: no acute medical issues.  6. Dispo: pending clinical improvement.  Patient continues to require inpatient hospitalization for stabilization and safety.    Alethea Carlin, PGY-1 HPI: per  [facility] assessment:   "Pt is a 57yo F, domiciled in [place], with 2 daughters, grandson, sister, and brother-in-law, unemployed on disability, PPHx dx of schizophrenia, hx of 3 prior psychiatric hospitalizations (most recent in 2017 at [facility]), currently in outpatient treatment with Dr. Ani Rainey ([phone]), no hx of SA/NSSI, no hx of violence/aggression, no substance misuse, no access to firearms, who presents to [facility] 4/17/23 referred by psychiatrist and accompanies by daughter for worsening auditory hallucinations.    Pt reports she has been experiencing worsening AH that are worsening in frequency and intensity. States she is almost constantly bothered by voices in the apartment next door (though no one lives there) that tell her to they are "doing Congregational" on her and are making her sick. States the voices are upsetting and keep her up at night. Also reports she has not been sleeping well due to constant voices and often feeling another body in bed next to her (sees a coat go up and down as if someone is breathing underneath it). This has been frightening to her. States she is getting only a few hours of sleep per night and feels exhausted during the day. She has been very preoccupied with the voices and has not been eating well. Mood has been low.    She has been on ziprasidone since d/c from [facility] in 2017. About 1 month ago, she was started on Vraylar as well to target the above sxs. 5 days ago dose was increased from 4.5 to 6mg qhs. Pt denies improvement in sxs since starting the Vraylar.    No manic sxs observed or reported. Denies SI. Denies HI.    Collateral obtained from daughter Karissa Bauer: She has observed her mom doing worse over the past month. States pt frequently appears distressed due to voices and is having a hard time dealing with it. She is not eating well, despite encouragement from daughter. Daughter reports that she has seen a similar pattern in the past which ultimately lead to hospitalization and she is trying to get her mom help before things become much worse. Current stressors are interpersonal conflict with pt's sister (with whom pt lives). She is concerned about her mother's well being. "    Patient evaluated by KYLE, confirms the above findings. On assessment, patient continues to endorse persistent worsening auditory hallucinations making threatening/hurtful comments. Also reports being fearful of her neighbors at home, worried they are talking negatively about her. Denies command hallucinations. Denies current or past SI. Endorses 10y of poor sleep, but recently worsening and was unable to sleep last night.     PPH: see HPI    PMH: DM2, HLD, hx of PE. During most recent hospitalization in 2017, had positive syphilis screen with recommendation to be treated on discharge.    Medications:   -ziprasidone 60mg qAM / 100mg qhs  -clonazepam 0.5mg BID  -hydroxyzine 50mg qhs  -Vraylar 6mg qd  -pravastatin 40mg qd  -metformin 500mg qd  -trospium 20mg BID    Allergies: denies    Substance: denies    Family Hx: none known    Social Hx: born in [place]    Access to weapons/guns: no    Vital Signs Last 24 Hrs  T(C): 36.8 (17 Apr 2024 17:54), Max: 36.8 (17 Apr 2024 17:54)  T(F): 98.3 (17 Apr 2024 17:54), Max: 98.3 (17 Apr 2024 17:54)  HR: --  BP: --  BP(mean): --  RR: 18 (17 Apr 2024 17:54) (18 - 18)  SpO2: 98% (17 Apr 2024 17:54) (98% - 98%)    MSE:  Appearance: appears stated age, dressed casually, well groomed. Behavior: cooperative, appropriate eye contact, in good behavioral control. Motor: no PMR/PMA. No abnormal movements including tremor. Speech: no increased latency, normal rate, tone, volume. TP: linear, goal-directed. TC: Denies SI/HI/I/P, no urges for self-harm. Described fear of neighbors talking about her. Endorses AH. Mood: "scared" Affect: anxious, reactive, mood congruent, appropriate. Cognition: alert, oriented to person, place, situation. Fund of knowledge: intact. Attention/Concentration: intact. Insight: fair. Judgment: fair. Impulse control: fair.    Labs:  COVID neg    Risk Assessment:   Unmodifiable risk factors: underlying dx, hx of 3 prior psychiatric hospitalizations.  Modifiable risk factors: current sxs (paranoia, AH)  Protective factors: stable domicile, social supports, help seeking, motivated to participate in treatment, no hx of SA/NSSI, no substance misuse.  Pt is not considered to be at an acute risk to self or others that would meet criteria for involuntary hospitalization. However, she would benefit from voluntary hospitalization for safety and stabilization given degree of symptomatology and poor functioning. She is agreeable to voluntary hospitalization.     Assessment/Plan:  Pt is a 57yo F, domiciled in [place], with 2 daughters, grandson, sister, and brother-in-law, unemployed on disability, PPHx dx of schizophrenia, hx of 3 prior psychiatric hospitalizations (most recent in 2017 at [facility]), currently in outpatient treatment with Dr. Ani Rainey ([phone]), no hx of SA/NSSI, no hx of violence/aggression, no substance misuse, no access to firearms, who presented to [facility] 4/17/23 referred by psychiatrist and accompanies by daughter for worsening auditory hallucinations. She is now admitted to [facility] on 9.13 for further management.    Plan:  1. Legals: admit to [facility] on 9.13 (voluntary)  2. Safety: routine observation, denies SI/HI/I/P on the unit.   3. Psychiatry: defer med optimization to primary team. Will continue the following home meds for now:  -ziprasidone 60mg qAM / 100mg qhs  -clonazepam 0.5mg BID  -hydroxyzine 50mg qhs  -PRNs for agitation: Haldol 5mg/ Ativan 2mg/ Benadryl 50mg q6 PO/IM  4. Group, milieu, individual therapy as appropriate.  5. Medical:  -confirm tx for syphilis given positive screen in 2017  -atorvastatin 10mg daily (at home, pravastatin 40mg daily)  -metformin 500mg daily  -oxybutynin 5mg BID (at home, trospium 20mg BID)  6. Dispo: pending clinical improvement.  Patient continues to require inpatient hospitalization for stabilization and safety.    Alethea Carlin, PGY-1 HPI: per  [facility] assessment:   "Pt is a 59yo F, domiciled in [place], with 2 daughters, grandson, sister, and brother-in-law, unemployed on disability, PPHx dx of schizophrenia, hx of 3 prior psychiatric hospitalizations (most recent in 2017 at [facility]), currently in outpatient treatment with Dr. Ani Rainey ([phone]), no hx of SA/NSSI, no hx of violence/aggression, no substance misuse, no access to firearms, who presents to [facility] 4/17/23 referred by psychiatrist and accompanies by daughter for worsening auditory hallucinations.    Pt reports she has been experiencing worsening AH that are worsening in frequency and intensity. States she is almost constantly bothered by voices in the apartment next door (though no one lives there) that tell her to they are "doing Hindu" on her and are making her sick. States the voices are upsetting and keep her up at night. Also reports she has not been sleeping well due to constant voices and often feeling another body in bed next to her (sees a coat go up and down as if someone is breathing underneath it). This has been frightening to her. States she is getting only a few hours of sleep per night and feels exhausted during the day. She has been very preoccupied with the voices and has not been eating well. Mood has been low.    She has been on ziprasidone since d/c from [facility] in 2017. About 1 month ago, she was started on Vraylar as well to target the above sxs. 5 days ago dose was increased from 4.5 to 6mg qhs. Pt denies improvement in sxs since starting the Vraylar.    No manic sxs observed or reported. Denies SI. Denies HI.    Collateral obtained from daughter Karissa Bauer: She has observed her mom doing worse over the past month. States pt frequently appears distressed due to voices and is having a hard time dealing with it. She is not eating well, despite encouragement from daughter. Daughter reports that she has seen a similar pattern in the past which ultimately lead to hospitalization and she is trying to get her mom help before things become much worse. Current stressors are interpersonal conflict with pt's sister (with whom pt lives). She is concerned about her mother's well being. "    Patient evaluated by KYLE and SPOC, confirms the above findings. On assessment, patient continues to endorse persistent worsening auditory hallucinations making threatening/hurtful comments that are distressing and bothersome. The comments make her feel upset and depressed. Patient describes knowing that the voices are "in my head" and "not real" but they have prevented her from sleeping and worsened depression over the last few days. Also reports being fearful of her neighbors at home, worried they are talking negatively about her. Denies command hallucinations. Denies current or past SI. Endorses 10y of poor sleep, but recently worsening and was unable to sleep last night.     PPH: see HPI    PMH: DM2, HLD, hx of PE. During most recent hospitalization in 2017, had positive syphilis screen with recommendation to be treated on discharge.    Medications:   -ziprasidone 60mg qAM / 100mg qhs  -clonazepam 0.5mg BID  -hydroxyzine 50mg qhs  -Vraylar 6mg qd  -pravastatin 40mg qd  -metformin 500mg qd  -trospium 20mg BID    Allergies: denies    Substance: denies    Family Hx: none known    Social Hx: born in [place]    Access to weapons/guns: no    Vital Signs Last 24 Hrs  T(C): 36.8 (17 Apr 2024 17:54), Max: 36.8 (17 Apr 2024 17:54)  T(F): 98.3 (17 Apr 2024 17:54), Max: 98.3 (17 Apr 2024 17:54)  HR: --  BP: --  BP(mean): --  RR: 18 (17 Apr 2024 17:54) (18 - 18)  SpO2: 98% (17 Apr 2024 17:54) (98% - 98%)    MSE:  Appearance: appears stated age, dressed casually, well groomed. Behavior: cooperative, appropriate eye contact, in good behavioral control. Motor: no PMR/PMA. No abnormal movements including tremor. Speech: no increased latency, normal rate, tone, volume. TP: linear, goal-directed. TC: Denies SI/HI/I/P, no urges for self-harm. Described fear of neighbors talking about her. Endorses AH. Mood: "scared" Affect: anxious, reactive, mood congruent, appropriate. Cognition: alert, oriented to person, place, situation. Fund of knowledge: intact. Attention/Concentration: intact. Insight: fair. Judgment: fair. Impulse control: fair.    Labs:  COVID neg    Risk Assessment:   Unmodifiable risk factors: underlying dx, hx of 3 prior psychiatric hospitalizations.  Modifiable risk factors: current sxs (paranoia, AH)  Protective factors: stable domicile, social supports, help seeking, motivated to participate in treatment, no hx of SA/NSSI, no substance misuse, insight regarding AH  Pt is not considered to be at an acute risk to self or others that would meet criteria for involuntary hospitalization. However, she would benefit from voluntary hospitalization for safety and stabilization given degree of symptomatology and poor functioning. She is agreeable to voluntary hospitalization.     Assessment/Plan:  Pt is a 59yo F, domiciled in [place], with 2 daughters, grandson, sister, and brother-in-law, unemployed on disability, PPHx dx of schizophrenia, hx of 3 prior psychiatric hospitalizations (most recent in 2017 at [facility]), currently in outpatient treatment with Dr. Ani Rainey ([phone]), no hx of SA/NSSI, no hx of violence/aggression, no substance misuse, no access to firearms, who presented to [facility] 4/17/23 referred by psychiatrist and accompanies by daughter for worsening auditory hallucinations preventing her from sleeping, functioning at baseline and feeling safe to leave the home. She is now admitted to [facility] on 9.13 for further management.    Plan:  1. Legals: admit to [facility] on 9.13 (voluntary)  2. Safety: routine observation, denies SI/HI/I/P on the unit.   3. Psychiatry: defer med optimization to primary team. Will continue the following home meds for now:  -ziprasidone 60mg qAM / 100mg qhs  -clonazepam 0.5mg BID  -hydroxyzine 50mg qhs  -PRNs for agitation: Haldol 5mg/ Ativan 2mg/ Benadryl 50mg q6 PO/IM  4. Group, milieu, individual therapy as appropriate.  5. Medical:  -confirm tx for syphilis given positive screen in 2017  -atorvastatin 10mg daily (at home, pravastatin 40mg daily)  -metformin 500mg daily  -oxybutynin 5mg BID (at home, trospium 20mg BID)  6. Dispo: pending clinical improvement.  Patient continues to require inpatient hospitalization for stabilization and safety.    Alethea Carlin, PGY-1  Liz Bueno, PGY-4, [facility]

## 2024-04-18 LAB
A1C WITH ESTIMATED AVERAGE GLUCOSE RESULT: 6.1 % — HIGH (ref 4–5.6)
ALBUMIN SERPL ELPH-MCNC: 3.8 G/DL — SIGNIFICANT CHANGE UP (ref 3.3–5)
ALP SERPL-CCNC: 72 U/L — SIGNIFICANT CHANGE UP (ref 40–120)
ALT FLD-CCNC: 17 U/L — SIGNIFICANT CHANGE UP (ref 4–33)
ANION GAP SERPL CALC-SCNC: 15 MMOL/L — HIGH (ref 7–14)
AST SERPL-CCNC: 16 U/L — SIGNIFICANT CHANGE UP (ref 4–32)
BILIRUB SERPL-MCNC: 0.8 MG/DL — SIGNIFICANT CHANGE UP (ref 0.2–1.2)
BUN SERPL-MCNC: 17 MG/DL — SIGNIFICANT CHANGE UP (ref 7–23)
CALCIUM SERPL-MCNC: 9.3 MG/DL — SIGNIFICANT CHANGE UP (ref 8.4–10.5)
CHLORIDE SERPL-SCNC: 107 MMOL/L — SIGNIFICANT CHANGE UP (ref 98–107)
CHOLEST SERPL-MCNC: 152 MG/DL — SIGNIFICANT CHANGE UP
CO2 SERPL-SCNC: 18 MMOL/L — LOW (ref 22–31)
CREAT SERPL-MCNC: 1.35 MG/DL — HIGH (ref 0.5–1.3)
EGFR: 46 ML/MIN/1.73M2 — LOW
ESTIMATED AVERAGE GLUCOSE: 128 — SIGNIFICANT CHANGE UP
GLUCOSE SERPL-MCNC: 300 MG/DL — HIGH (ref 70–99)
HDLC SERPL-MCNC: 58 MG/DL — SIGNIFICANT CHANGE UP
LIPID PNL WITH DIRECT LDL SERPL: 72 MG/DL — SIGNIFICANT CHANGE UP
NON HDL CHOLESTEROL: 94 MG/DL — SIGNIFICANT CHANGE UP
POTASSIUM SERPL-MCNC: 5.1 MMOL/L — SIGNIFICANT CHANGE UP (ref 3.5–5.3)
POTASSIUM SERPL-SCNC: 5.1 MMOL/L — SIGNIFICANT CHANGE UP (ref 3.5–5.3)
PROT SERPL-MCNC: 6.6 G/DL — SIGNIFICANT CHANGE UP (ref 6–8.3)
SODIUM SERPL-SCNC: 140 MMOL/L — SIGNIFICANT CHANGE UP (ref 135–145)
TRIGL SERPL-MCNC: 108 MG/DL — SIGNIFICANT CHANGE UP
TSH SERPL-MCNC: 2.04 UIU/ML — SIGNIFICANT CHANGE UP (ref 0.27–4.2)

## 2024-04-18 PROCEDURE — 99222 1ST HOSP IP/OBS MODERATE 55: CPT

## 2024-04-18 PROCEDURE — 90853 GROUP PSYCHOTHERAPY: CPT

## 2024-04-18 RX ORDER — INSULIN LISPRO 100/ML
VIAL (ML) SUBCUTANEOUS
Refills: 0 | Status: DISCONTINUED | OUTPATIENT
Start: 2024-04-18 | End: 2024-05-03

## 2024-04-18 RX ORDER — ARIPIPRAZOLE 15 MG/1
5 TABLET ORAL AT BEDTIME
Refills: 0 | Status: DISCONTINUED | OUTPATIENT
Start: 2024-04-18 | End: 2024-04-20

## 2024-04-18 RX ADMIN — Medication 0.5 MILLIGRAM(S): at 20:12

## 2024-04-18 RX ADMIN — ARIPIPRAZOLE 5 MILLIGRAM(S): 15 TABLET ORAL at 20:13

## 2024-04-18 RX ADMIN — ZIPRASIDONE HYDROCHLORIDE 100 MILLIGRAM(S): 20 CAPSULE ORAL at 20:13

## 2024-04-18 RX ADMIN — Medication 0.5 MILLIGRAM(S): at 08:16

## 2024-04-18 RX ADMIN — METFORMIN HYDROCHLORIDE 500 MILLIGRAM(S): 850 TABLET ORAL at 08:16

## 2024-04-18 RX ADMIN — ATORVASTATIN CALCIUM 10 MILLIGRAM(S): 80 TABLET, FILM COATED ORAL at 20:12

## 2024-04-18 RX ADMIN — ZIPRASIDONE HYDROCHLORIDE 60 MILLIGRAM(S): 20 CAPSULE ORAL at 08:16

## 2024-04-18 RX ADMIN — Medication 5 MILLIGRAM(S): at 08:16

## 2024-04-18 RX ADMIN — Medication 5 MILLIGRAM(S): at 20:13

## 2024-04-18 RX ADMIN — Medication 50 MILLIGRAM(S): at 20:12

## 2024-04-18 NOTE — BH SOCIAL WORK INITIAL PSYCHOSOCIAL EVALUATION - OTHER PAST PSYCHIATRIC HISTORY (INCLUDE DETAILS REGARDING ONSET, COURSE OF ILLNESS, INPATIENT/OUTPATIENT TREATMENT)
As per crisis: Pt is a 57yo F, domiciled in [place], with 2 daughters, grandson, sister, and brother-in-law, unemployed on disability, PPHx dx of schizophrenia, hx of 3 prior psychiatric hospitalizations (most recent in 2017 at [facility]), currently in outpatient treatment with Dr. Ani Rainey ([phone]), no hx of SA/NSSI, no hx of violence/aggression, no substance misuse, no access to firearms, who presents to [facility] 4/17/23 referred by psychiatrist and accompanies by daughter for worsening auditory hallucinations.      Pt reports she has been experiencing worsening AH that are worsening in frequency and intensity. States she is almost constantly bothered by voices in the apartment next door (though no one lives there) that tell her to they are "doing [employer]" on her and are making her sick. States the voices are upsetting and keep her up at night. Also reports she has not been sleeping well due to constant voices and often feeling another body in bed next to her (sees a coat go up and down as if someone is breathing underneath it). This has been frightening to her. States she is getting only a few hours of sleep per night and feels exhausted during the day. She has been very preoccupied with the voices and has not been eating well. Mood has been low.            Collateral obtained from daughter Karissa Bauer: She has observed her mom doing worse over the past month. States pt frequently appears distressed due to voices and is having a hard time dealing with it. She is not eating well, despite encouragement from daughter. Daughter reports that she has seen a similar pattern in the past which ultimately lead to hospitalization and she is trying to get her mom help before things become much worse. Current stressors are interpersonal conflict with pt's sister (with whom pt lives). She is concerned about her mother's well being. "      On the unit, SW met with the patient with NP present. PT reports she has been having worsening AH for the past week.  Pt reports the voices say "all kinds of things" like, "kill me," "the meds won't help," and also says they say they are doing [employer] on her.    Pt reports a trigger to the worsening voices is hearing her next door neighbors being loud.  Pt feels they are after her and feels she has to check outside when she hears them.  Pt reports she has not been able to sleep and does not feel safe at home due to this. Pt reports daily compliance with medication.  Pt reports she does not remember the names of any previous medication she has tried.  Pt denies substance use. Patient signed consent for tx team to speak with her daughter and outpatient provider.

## 2024-04-18 NOTE — BH SOCIAL WORK INITIAL PSYCHOSOCIAL EVALUATION - NSCMSPTSTRENGTHS_PSY_ALL_CORE
Compliance to treatment/Expressive of emotions/Highly motivated for treatment/Leisure interest/Supportive family

## 2024-04-18 NOTE — BH INPATIENT PSYCHIATRY ASSESSMENT NOTE - NSBHASSESSSUMMFT_PSY_ALL_CORE
Pt is a 57yo F, domiciled in [place], with 2 daughters, grandson, sister, and brother-in-law, unemployed on disability, PPHx dx of schizophrenia, hx of 3 prior psychiatric hospitalizations (most recent in 2017 at [facility]), currently in outpatient treatment with Dr. Ani Rainey ([phone]), no hx of SA/NSSI, no hx of violence/aggression, no substance misuse, no access to firearms, who presents to [facility] 4/17/23 referred by psychiatrist and accompanies by daughter for worsening auditory hallucinations.    >Legal: 9.13 VOL  >Obs: Routine, no current SI. no need for CO, patient not expected to pose risk to self or others in controlled inpatient setting  >Psychiatric Meds: Start Ablify 5mg PO at bedtime (psychosis) and continue Geodon 60mg PO daily and 100mg PO at bedtime (psychosis) and Klonopin 0.5mg PO BID (anxiety) for now  >Labs: Admission labs reviewed, no acute findings.   >Medical:  No acute concerns. No consultations needed at this time.  >Social: milieu/structured therapy  >Treatment Interventions: Groups and Individual Therapy/CBT  >Dispo: pending remission of sx Pt is a 59yo F, domiciled in [place], with 2 daughters, grandson, sister, and brother-in-law, unemployed on disability, PPHx dx of schizophrenia, hx of 3 prior psychiatric hospitalizations (most recent in 2017 at [facility]), currently in outpatient treatment with Dr. Ani Rainey ([phone]), no hx of SA/NSSI, no hx of violence/aggression, no substance misuse, no access to firearms, who presents to [facility] 4/17/23 referred by psychiatrist and accompanies by daughter for worsening auditory hallucinations.    PLAN  >Legal: 9.13 VOL  >Obs: Routine, no current SI. no need for CO, patient not expected to pose risk to self or others in controlled inpatient setting  >Psychiatric Meds: Start Ablify 5mg PO at bedtime (psychosis) and continue Geodon 60mg PO daily and 100mg PO at bedtime (psychosis) and Klonopin 0.5mg PO BID (anxiety) for now; goal of downtaper to stoppage of all BZD  split dosing of geodon offers no real benefits, can also do QTc monitoring  Goal of GAY for safety and compliance  >Labs: Admission labs reviewed, no acute findings.   >Medical:  No acute concerns. No consultations needed at this time.  >Social: milieu/structured therapy  >Treatment Interventions: Groups and Individual Therapy/CBT  >Dispo: pending remission of sx

## 2024-04-18 NOTE — BH INPATIENT PSYCHIATRY ASSESSMENT NOTE - DESCRIPTION
Born in [place], lives with her 2 daughters, grandson, sister and brother in law, unemployed, on disability

## 2024-04-18 NOTE — BH INPATIENT PSYCHIATRY ASSESSMENT NOTE - CURRENT MEDICATION
MEDICATIONS  (STANDING):  ARIPiprazole 5 milliGRAM(s) Oral at bedtime  atorvastatin 10 milliGRAM(s) Oral at bedtime  clonazePAM  Tablet 0.5 milliGRAM(s) Oral two times a day  hydrOXYzine hydrochloride 50 milliGRAM(s) Oral at bedtime  metFORMIN 500 milliGRAM(s) Oral daily  oxybutynin 5 milliGRAM(s) Oral two times a day  ziprasidone 100 milliGRAM(s) Oral <User Schedule>  ziprasidone 60 milliGRAM(s) Oral <User Schedule>    MEDICATIONS  (PRN):  diphenhydrAMINE 50 milliGRAM(s) Oral every 6 hours PRN agiation  diphenhydrAMINE Injectable 50 milliGRAM(s) IntraMuscular once PRN agiation  haloperidol     Tablet 5 milliGRAM(s) Oral every 6 hours PRN agitation  haloperidol    Injectable 5 milliGRAM(s) IntraMuscular once PRN acute agiation  LORazepam     Tablet 2 milliGRAM(s) Oral every 6 hours PRN agitation  LORazepam   Injectable 2 milliGRAM(s) IntraMuscular once PRN agitation   MEDICATIONS  (STANDING):  ARIPiprazole 5 milliGRAM(s) Oral at bedtime  atorvastatin 10 milliGRAM(s) Oral at bedtime  clonazePAM  Tablet 0.5 milliGRAM(s) Oral two times a day  hydrOXYzine hydrochloride 50 milliGRAM(s) Oral at bedtime  insulin lispro (ADMELOG) corrective regimen sliding scale   SubCutaneous three times a day before meals  metFORMIN 500 milliGRAM(s) Oral daily  oxybutynin 5 milliGRAM(s) Oral two times a day  ziprasidone 100 milliGRAM(s) Oral <User Schedule>  ziprasidone 60 milliGRAM(s) Oral <User Schedule>    MEDICATIONS  (PRN):  diphenhydrAMINE 50 milliGRAM(s) Oral every 6 hours PRN agiation  diphenhydrAMINE Injectable 50 milliGRAM(s) IntraMuscular once PRN agiation  haloperidol     Tablet 5 milliGRAM(s) Oral every 6 hours PRN agitation  haloperidol    Injectable 5 milliGRAM(s) IntraMuscular once PRN acute agiation  LORazepam     Tablet 2 milliGRAM(s) Oral every 6 hours PRN agitation  LORazepam   Injectable 2 milliGRAM(s) IntraMuscular once PRN agitation

## 2024-04-18 NOTE — BH INPATIENT PSYCHIATRY ASSESSMENT NOTE - RISK ASSESSMENT
Unmodifiable risk factors: underlying dx, hx of 3 prior psychiatric hospitalizations.  Modifiable risk factors: current sxs (paranoia, AH)  Protective factors: stable domicile, social supports, help seeking, motivated to participate in treatment, no hx of SA/NSSI, no substance misuse, insight regarding AH  Pt is not considered to be at an acute risk to self or others that would meet criteria for involuntary hospitalization. However, she would benefit from voluntary hospitalization for safety and stabilization given degree of symptomatology and poor functioning. She is agreeable to voluntary hospitalization.

## 2024-04-18 NOTE — BH INPATIENT PSYCHIATRY ASSESSMENT NOTE - HPI (INCLUDE ILLNESS QUALITY, SEVERITY, DURATION, TIMING, CONTEXT, MODIFYING FACTORS, ASSOCIATED SIGNS AND SYMPTOMS)
Per [facility], "Pt is a 57yo F, domiciled in [place], with 2 daughters, grandson, sister, and brother-in-law, unemployed on disability, PPHx dx of schizophrenia, hx of 3 prior psychiatric hospitalizations (most recent in 2017 at [facility]), currently in outpatient treatment with Dr. Ani Rainey ([phone]), no hx of SA/NSSI, no hx of violence/aggression, no substance misuse, no access to firearms, who presents to [facility] 4/17/23 referred by psychiatrist and accompanies by daughter for worsening auditory hallucinations.    Pt reports she has been experiencing worsening AH that are worsening in frequency and intensity. States she is almost constantly bothered by voices in the apartment next door (though no one lives there) that tell her to they are "doing [employer]" on her and are making her sick. States the voices are upsetting and keep her up at night. Also reports she has not been sleeping well due to constant voices and often feeling another body in bed next to her (sees a coat go up and down as if someone is breathing underneath it). This has been frightening to her. States she is getting only a few hours of sleep per night and feels exhausted during the day. She has been very preoccupied with the voices and has not been eating well. Mood has been low.    She has been on ziprasidone since d/c from [facility] in 2017. About 1 month ago, she was started on Vraylar as well to target the above sxs. 5 days ago dose was increased from 4.5 to 6mg qhs. Pt denies improvement in sxs since starting the Vraylar.    No manic sxs observed or reported. Denies SI. Denies HI.    Collateral obtained from daughter Karissa Bauer: She has observed her mom doing worse over the past month. States pt frequently appears distressed due to voices and is having a hard time dealing with it. She is not eating well, despite encouragement from daughter. Daughter reports that she has seen a similar pattern in the past which ultimately lead to hospitalization and she is trying to get her mom help before things become much worse. Current stressors are interpersonal conflict with pt's sister (with whom pt lives). She is concerned about her mother's well being. ""    On the unit, patient reports she has been having worsening AH for the past week.  Pt reports the voices say "all kinds of things" like, "kill me," "the meds won't help," and also says they say they are doing [employer] on her.  Pt denies CAH.  Pt denies SI/HI/I/P or VH.  Pt reports a trigger to the worsening voices is hearing her next door neighbors being loud.  Pt feels they are after her and feels she has to check outside when she hears them.  Pt reports she has not been able to sleep and does not feel safe at home due to this.  Pt denies manic sx.  Pt denies changes in appetite and reports her daughter encourages her to do her ADLs.  Pt reports daily compliance with medication.  Pt reports she does not remember the names of any previous medication she has tried.  Pt denies substance use.

## 2024-04-18 NOTE — BH INPATIENT PSYCHIATRY ASSESSMENT NOTE - NSBHMETABOLIC_PSY_ALL_CORE_FT
BMI: BMI (kg/m2): 33 (04-17-24 @ 17:54)  HbA1c: A1C with Estimated Average Glucose Result: 6.1 % (04-18-24 @ 08:00)    Glucose:   BP: --Vital Signs Last 24 Hrs  T(C): 36.2 (04-18-24 @ 07:46), Max: 36.8 (04-17-24 @ 17:54)  T(F): 97.1 (04-18-24 @ 07:46), Max: 98.3 (04-17-24 @ 17:54)  HR: --  BP: --  BP(mean): --  RR: 18 (04-17-24 @ 17:54) (18 - 18)  SpO2: 98% (04-17-24 @ 17:54) (98% - 98%)    Orthostatic VS  04-18-24 @ 07:46  Lying BP: --/-- HR: --  Sitting BP: 128/82 HR: 96  Standing BP: --/-- HR: --  Site: --  Mode: --  Orthostatic VS  04-17-24 @ 20:37  Lying BP: --/-- HR: --  Sitting BP: 125/87 HR: 107  Standing BP: 113/88 HR: 113  Site: --  Mode: --  Orthostatic VS  04-17-24 @ 17:54  Lying BP: --/-- HR: --  Sitting BP: 139/91 HR: 103  Standing BP: 134/95 HR: 99  Site: upper left arm  Mode: electronic    Lipid Panel: Date/Time: 04-18-24 @ 08:00  Cholesterol, Serum: 152  LDL Cholesterol Calculated: 72  HDL Cholesterol, Serum: 58  Total Cholesterol/HDL Ration Measurement: --  Triglycerides, Serum: 108   BMI: BMI (kg/m2): 33 (04-17-24 @ 17:54)  HbA1c: A1C with Estimated Average Glucose Result: 6.1 % (04-18-24 @ 08:00)    Glucose: POCT Blood Glucose.: 265 mg/dL (04-19-24 @ 16:21)    BP: --Vital Signs Last 24 Hrs  T(C): 36.5 (04-19-24 @ 08:18), Max: 36.5 (04-19-24 @ 08:18)  T(F): 97.7 (04-19-24 @ 08:18), Max: 97.7 (04-19-24 @ 08:18)  HR: --  BP: --  BP(mean): --  RR: --  SpO2: --    Orthostatic VS  04-19-24 @ 08:18  Lying BP: --/-- HR: --  Sitting BP: 117/80 HR: 106  Standing BP: 108/74 HR: 108  Site: --  Mode: --  Orthostatic VS  04-18-24 @ 07:46  Lying BP: --/-- HR: --  Sitting BP: 128/82 HR: 96  Standing BP: --/-- HR: --  Site: --  Mode: --  Orthostatic VS  04-17-24 @ 20:37  Lying BP: --/-- HR: --  Sitting BP: 125/87 HR: 107  Standing BP: 113/88 HR: 113  Site: --  Mode: --    Lipid Panel: Date/Time: 04-18-24 @ 08:00  Cholesterol, Serum: 152  LDL Cholesterol Calculated: 72  HDL Cholesterol, Serum: 58  Total Cholesterol/HDL Ration Measurement: --  Triglycerides, Serum: 108

## 2024-04-18 NOTE — PSYCHIATRIC REHAB INITIAL EVALUATION - NSBHPRRECOMMEND_PSY_ALL_CORE
Patient was seen individually by psych rehab staff to orient her to the unit and introduce her to psych rehab department and its functions as well as to assess functioning. Upon approach, patient was willing to meet with psych rehab staff. Patient was provided with a unit schedule and encouraged to attend daily psychiatric rehabilitation groups for improved insight and symptom management. Patient’s appearance was kempt and was observed to be dressed casually. Patient presents as anxious, but engaged and cooperative with interview. Patient was noted to be disorganized.   When asked her reason for admission, patient states that she's been experiencing worsening AH. This corroborates the Psychiatry Assessment Note which states: "Pt is a 59yo F, domiciled in [place], with 2 daughters, grandson, sister, and brother-in-law, unemployed on disability, PPHx dx of schizophrenia, hx of 3 prior psychiatric hospitalizations (most recent in 2017 at [facility]), currently in outpatient treatment with Dr. Ani Rainey ([phone]), no hx of SA/NSSI, no hx of violence/aggression, no substance misuse, no access to firearms, who presents to [facility] 4/17/23 referred by psychiatrist and accompanies by daughter for worsening auditory hallucinations."  Patient and psych rehab staff were to identify a collaborative goal which is identifying 2 coping skills that help mitigate hallucinations. Psych rehab staff will provide counseling and daily group therapy to assist patient in achieving therapeutic goals. Psych rehab staff will also continue to engage patient daily in order to build therapeutic rapport. Patient confirms AH, denies SI/HI/VH. Psych rehab recommends that patient attend individual and group therapy for support, psychoeducation and skill-integration as well as to facilitate progress towards specified goal.

## 2024-04-18 NOTE — BH INPATIENT PSYCHIATRY ASSESSMENT NOTE - NSBHCHARTREVIEWVS_PSY_A_CORE FT
Vital Signs Last 24 Hrs  T(C): 36.2 (04-18-24 @ 07:46), Max: 36.8 (04-17-24 @ 17:54)  T(F): 97.1 (04-18-24 @ 07:46), Max: 98.3 (04-17-24 @ 17:54)  HR: --  BP: --  BP(mean): --  RR: 18 (04-17-24 @ 17:54) (18 - 18)  SpO2: 98% (04-17-24 @ 17:54) (98% - 98%)    Orthostatic VS  04-18-24 @ 07:46  Lying BP: --/-- HR: --  Sitting BP: 128/82 HR: 96  Standing BP: --/-- HR: --  Site: --  Mode: --  Orthostatic VS  04-17-24 @ 20:37  Lying BP: --/-- HR: --  Sitting BP: 125/87 HR: 107  Standing BP: 113/88 HR: 113  Site: --  Mode: --  Orthostatic VS  04-17-24 @ 17:54  Lying BP: --/-- HR: --  Sitting BP: 139/91 HR: 103  Standing BP: 134/95 HR: 99  Site: upper left arm  Mode: electronic   Vital Signs Last 24 Hrs  T(C): 36.5 (04-19-24 @ 08:18), Max: 36.5 (04-19-24 @ 08:18)  T(F): 97.7 (04-19-24 @ 08:18), Max: 97.7 (04-19-24 @ 08:18)  HR: --  BP: --  BP(mean): --  RR: --  SpO2: --    Orthostatic VS  04-19-24 @ 08:18  Lying BP: --/-- HR: --  Sitting BP: 117/80 HR: 106  Standing BP: 108/74 HR: 108  Site: --  Mode: --  Orthostatic VS  04-18-24 @ 07:46  Lying BP: --/-- HR: --  Sitting BP: 128/82 HR: 96  Standing BP: --/-- HR: --  Site: --  Mode: --  Orthostatic VS  04-17-24 @ 20:37  Lying BP: --/-- HR: --  Sitting BP: 125/87 HR: 107  Standing BP: 113/88 HR: 113  Site: --  Mode: --

## 2024-04-18 NOTE — BH INPATIENT PSYCHIATRY ASSESSMENT NOTE - ATTENDING COMMENTS
Agree with A&P with minor edits above  Does not endorse SI or plan for self harm while on unit to MD  No CO warranted at this time  Split dosing of Geodon offers no therapeutic benefit, and qhs likely to benefit sleep  Goal of downtaper to stoppage of all BZD  G&M therapy  Supportive therapy as tolerated

## 2024-04-18 NOTE — PSYCHIATRIC REHAB INITIAL EVALUATION - NSBHALCSUBTREAT_PSY_ALL_CORE
-hx of 3 prior psychiatric hospitalizations (most recent in 2017 at [facility])  -currently in outpatient treatment with Dr. Ani Rainey ([phone])/Outpatient clinic (specify)

## 2024-04-19 LAB
ALBUMIN SERPL ELPH-MCNC: 3.8 G/DL — SIGNIFICANT CHANGE UP (ref 3.3–5)
ALP SERPL-CCNC: 71 U/L — SIGNIFICANT CHANGE UP (ref 40–120)
ALT FLD-CCNC: 16 U/L — SIGNIFICANT CHANGE UP (ref 4–33)
ANION GAP SERPL CALC-SCNC: 14 MMOL/L — SIGNIFICANT CHANGE UP (ref 7–14)
AST SERPL-CCNC: 16 U/L — SIGNIFICANT CHANGE UP (ref 4–32)
BASOPHILS # BLD AUTO: 0.04 K/UL — SIGNIFICANT CHANGE UP (ref 0–0.2)
BASOPHILS NFR BLD AUTO: 0.4 % — SIGNIFICANT CHANGE UP (ref 0–2)
BILIRUB SERPL-MCNC: 1 MG/DL — SIGNIFICANT CHANGE UP (ref 0.2–1.2)
BUN SERPL-MCNC: 22 MG/DL — SIGNIFICANT CHANGE UP (ref 7–23)
CALCIUM SERPL-MCNC: 9.2 MG/DL — SIGNIFICANT CHANGE UP (ref 8.4–10.5)
CHLORIDE SERPL-SCNC: 104 MMOL/L — SIGNIFICANT CHANGE UP (ref 98–107)
CO2 SERPL-SCNC: 20 MMOL/L — LOW (ref 22–31)
CREAT SERPL-MCNC: 1.25 MG/DL — SIGNIFICANT CHANGE UP (ref 0.5–1.3)
EGFR: 51 ML/MIN/1.73M2 — LOW
EOSINOPHIL # BLD AUTO: 0.13 K/UL — SIGNIFICANT CHANGE UP (ref 0–0.5)
EOSINOPHIL NFR BLD AUTO: 1.4 % — SIGNIFICANT CHANGE UP (ref 0–6)
GLUCOSE BLDC GLUCOMTR-MCNC: 136 MG/DL — HIGH (ref 70–99)
GLUCOSE BLDC GLUCOMTR-MCNC: 265 MG/DL — HIGH (ref 70–99)
GLUCOSE BLDC GLUCOMTR-MCNC: 80 MG/DL — SIGNIFICANT CHANGE UP (ref 70–99)
GLUCOSE BLDC GLUCOMTR-MCNC: 88 MG/DL — SIGNIFICANT CHANGE UP (ref 70–99)
GLUCOSE SERPL-MCNC: 183 MG/DL — HIGH (ref 70–99)
HCT VFR BLD CALC: 39.6 % — SIGNIFICANT CHANGE UP (ref 34.5–45)
HGB BLD-MCNC: 13.4 G/DL — SIGNIFICANT CHANGE UP (ref 11.5–15.5)
IANC: 6.38 K/UL — SIGNIFICANT CHANGE UP (ref 1.8–7.4)
IMM GRANULOCYTES NFR BLD AUTO: 0.3 % — SIGNIFICANT CHANGE UP (ref 0–0.9)
LYMPHOCYTES # BLD AUTO: 2.01 K/UL — SIGNIFICANT CHANGE UP (ref 1–3.3)
LYMPHOCYTES # BLD AUTO: 21.7 % — SIGNIFICANT CHANGE UP (ref 13–44)
MCHC RBC-ENTMCNC: 29.3 PG — SIGNIFICANT CHANGE UP (ref 27–34)
MCHC RBC-ENTMCNC: 33.8 GM/DL — SIGNIFICANT CHANGE UP (ref 32–36)
MCV RBC AUTO: 86.5 FL — SIGNIFICANT CHANGE UP (ref 80–100)
MONOCYTES # BLD AUTO: 0.69 K/UL — SIGNIFICANT CHANGE UP (ref 0–0.9)
MONOCYTES NFR BLD AUTO: 7.4 % — SIGNIFICANT CHANGE UP (ref 2–14)
NEUTROPHILS # BLD AUTO: 6.38 K/UL — SIGNIFICANT CHANGE UP (ref 1.8–7.4)
NEUTROPHILS NFR BLD AUTO: 68.8 % — SIGNIFICANT CHANGE UP (ref 43–77)
NRBC # BLD: 0 /100 WBCS — SIGNIFICANT CHANGE UP (ref 0–0)
NRBC # FLD: 0 K/UL — SIGNIFICANT CHANGE UP (ref 0–0)
PLATELET # BLD AUTO: 267 K/UL — SIGNIFICANT CHANGE UP (ref 150–400)
POTASSIUM SERPL-MCNC: 4.8 MMOL/L — SIGNIFICANT CHANGE UP (ref 3.5–5.3)
POTASSIUM SERPL-SCNC: 4.8 MMOL/L — SIGNIFICANT CHANGE UP (ref 3.5–5.3)
PROT SERPL-MCNC: 6.7 G/DL — SIGNIFICANT CHANGE UP (ref 6–8.3)
RBC # BLD: 4.58 M/UL — SIGNIFICANT CHANGE UP (ref 3.8–5.2)
RBC # FLD: 14.1 % — SIGNIFICANT CHANGE UP (ref 10.3–14.5)
SODIUM SERPL-SCNC: 138 MMOL/L — SIGNIFICANT CHANGE UP (ref 135–145)
WBC # BLD: 9.28 K/UL — SIGNIFICANT CHANGE UP (ref 3.8–10.5)
WBC # FLD AUTO: 9.28 K/UL — SIGNIFICANT CHANGE UP (ref 3.8–10.5)

## 2024-04-19 PROCEDURE — 90853 GROUP PSYCHOTHERAPY: CPT

## 2024-04-19 PROCEDURE — 99232 SBSQ HOSP IP/OBS MODERATE 35: CPT

## 2024-04-19 RX ADMIN — METFORMIN HYDROCHLORIDE 500 MILLIGRAM(S): 850 TABLET ORAL at 08:10

## 2024-04-19 RX ADMIN — ARIPIPRAZOLE 5 MILLIGRAM(S): 15 TABLET ORAL at 20:21

## 2024-04-19 RX ADMIN — Medication 3: at 17:02

## 2024-04-19 RX ADMIN — ZIPRASIDONE HYDROCHLORIDE 60 MILLIGRAM(S): 20 CAPSULE ORAL at 08:10

## 2024-04-19 RX ADMIN — Medication 5 MILLIGRAM(S): at 20:21

## 2024-04-19 RX ADMIN — Medication 0.5 MILLIGRAM(S): at 20:21

## 2024-04-19 RX ADMIN — ATORVASTATIN CALCIUM 10 MILLIGRAM(S): 80 TABLET, FILM COATED ORAL at 20:21

## 2024-04-19 RX ADMIN — Medication 50 MILLIGRAM(S): at 20:21

## 2024-04-19 RX ADMIN — Medication 0.5 MILLIGRAM(S): at 08:11

## 2024-04-19 RX ADMIN — ZIPRASIDONE HYDROCHLORIDE 100 MILLIGRAM(S): 20 CAPSULE ORAL at 20:21

## 2024-04-19 RX ADMIN — Medication 5 MILLIGRAM(S): at 08:10

## 2024-04-19 NOTE — BH INPATIENT PSYCHIATRY PROGRESS NOTE - NSBHASSESSSUMMFT_PSY_ALL_CORE
Pt is a 57yo F, domiciled in [place], with 2 daughters, grandson, sister, and brother-in-law, unemployed on disability, PPHx dx of schizophrenia, hx of 3 prior psychiatric hospitalizations (most recent in 2017 at [facility]), currently in outpatient treatment with Dr. Ani Rainey ([phone]), no hx of SA/NSSI, no hx of violence/aggression, no substance misuse, no access to firearms, who presents to [facility] 4/17/23 referred by psychiatrist and accompanies by daughter for worsening auditory hallucinations.    >Legal: 9.13 VOL  >Obs: Routine, no current SI. no need for CO, patient not expected to pose risk to self or others in controlled inpatient setting  >Psychiatric Meds: Start Ablify 5mg PO at bedtime (psychosis) and continue Geodon 60mg PO daily and 100mg PO at bedtime (psychosis) and Klonopin 0.5mg PO BID (anxiety) for now  >Labs: Admission labs reviewed, no acute findings.   >Medical:  No acute concerns. No consultations needed at this time.  >Social: milieu/structured therapy  >Treatment Interventions: Groups and Individual Therapy/CBT  >Dispo: pending remission of sx

## 2024-04-20 LAB
GLUCOSE BLDC GLUCOMTR-MCNC: 112 MG/DL — HIGH (ref 70–99)
GLUCOSE BLDC GLUCOMTR-MCNC: 112 MG/DL — HIGH (ref 70–99)
GLUCOSE BLDC GLUCOMTR-MCNC: 132 MG/DL — HIGH (ref 70–99)
GLUCOSE BLDC GLUCOMTR-MCNC: 63 MG/DL — LOW (ref 70–99)

## 2024-04-20 PROCEDURE — 99231 SBSQ HOSP IP/OBS SF/LOW 25: CPT

## 2024-04-20 RX ORDER — ARIPIPRAZOLE 15 MG/1
10 TABLET ORAL AT BEDTIME
Refills: 0 | Status: DISCONTINUED | OUTPATIENT
Start: 2024-04-20 | End: 2024-04-23

## 2024-04-20 RX ADMIN — Medication 0.5 MILLIGRAM(S): at 08:59

## 2024-04-20 RX ADMIN — Medication 5 MILLIGRAM(S): at 20:14

## 2024-04-20 RX ADMIN — ATORVASTATIN CALCIUM 10 MILLIGRAM(S): 80 TABLET, FILM COATED ORAL at 20:14

## 2024-04-20 RX ADMIN — ZIPRASIDONE HYDROCHLORIDE 60 MILLIGRAM(S): 20 CAPSULE ORAL at 13:01

## 2024-04-20 RX ADMIN — ARIPIPRAZOLE 10 MILLIGRAM(S): 15 TABLET ORAL at 20:14

## 2024-04-20 RX ADMIN — METFORMIN HYDROCHLORIDE 500 MILLIGRAM(S): 850 TABLET ORAL at 08:59

## 2024-04-20 RX ADMIN — ZIPRASIDONE HYDROCHLORIDE 100 MILLIGRAM(S): 20 CAPSULE ORAL at 20:15

## 2024-04-20 RX ADMIN — Medication 50 MILLIGRAM(S): at 20:13

## 2024-04-20 RX ADMIN — Medication 5 MILLIGRAM(S): at 08:59

## 2024-04-20 RX ADMIN — Medication 0.5 MILLIGRAM(S): at 20:13

## 2024-04-20 NOTE — BH INPATIENT PSYCHIATRY PROGRESS NOTE - NSBHASSESSSUMMFT_PSY_ALL_CORE
Pt is a 59yo F, domiciled in [place], with 2 daughters, grandson, sister, and brother-in-law, unemployed on disability, PPHx dx of schizophrenia, hx of 3 prior psychiatric hospitalizations (most recent in 2017 at [facility]), currently in outpatient treatment with Dr. Ani Rainey ([phone]), no hx of SA/NSSI, no hx of violence/aggression, no substance misuse, no access to firearms, who presents to [facility] 4/17/23 referred by psychiatrist and accompanies by daughter for worsening auditory hallucinations.    >Legal: 9.13 VOL  >Obs: Routine, no current SI. no need for CO, patient not expected to pose risk to self or others in controlled inpatient setting  >Psychiatric Meds: Start Ablify 5mg PO at bedtime (psychosis) and continue Geodon 60mg PO daily and 100mg PO at bedtime (psychosis) and Klonopin 0.5mg PO BID (anxiety) for now  >Labs: Admission labs reviewed, no acute findings.   >Medical:  No acute concerns. No consultations needed at this time.  >Social: milieu/structured therapy  >Treatment Interventions: Groups and Individual Therapy/CBT  >Dispo: pending remission of sx

## 2024-04-21 LAB
GLUCOSE BLDC GLUCOMTR-MCNC: 107 MG/DL — HIGH (ref 70–99)
GLUCOSE BLDC GLUCOMTR-MCNC: 137 MG/DL — HIGH (ref 70–99)
GLUCOSE BLDC GLUCOMTR-MCNC: 139 MG/DL — HIGH (ref 70–99)
GLUCOSE BLDC GLUCOMTR-MCNC: 57 MG/DL — LOW (ref 70–99)
GLUCOSE BLDC GLUCOMTR-MCNC: 73 MG/DL — SIGNIFICANT CHANGE UP (ref 70–99)
GLUCOSE BLDC GLUCOMTR-MCNC: 87 MG/DL — SIGNIFICANT CHANGE UP (ref 70–99)
GLUCOSE BLDC GLUCOMTR-MCNC: 94 MG/DL — SIGNIFICANT CHANGE UP (ref 70–99)
GLUCOSE BLDC GLUCOMTR-MCNC: 96 MG/DL — SIGNIFICANT CHANGE UP (ref 70–99)

## 2024-04-21 PROCEDURE — 99231 SBSQ HOSP IP/OBS SF/LOW 25: CPT

## 2024-04-21 RX ADMIN — Medication 0.5 MILLIGRAM(S): at 21:03

## 2024-04-21 RX ADMIN — ATORVASTATIN CALCIUM 10 MILLIGRAM(S): 80 TABLET, FILM COATED ORAL at 21:03

## 2024-04-21 RX ADMIN — Medication 5 MILLIGRAM(S): at 21:04

## 2024-04-21 RX ADMIN — METFORMIN HYDROCHLORIDE 500 MILLIGRAM(S): 850 TABLET ORAL at 08:05

## 2024-04-21 RX ADMIN — ARIPIPRAZOLE 10 MILLIGRAM(S): 15 TABLET ORAL at 21:03

## 2024-04-21 RX ADMIN — Medication 5 MILLIGRAM(S): at 08:05

## 2024-04-21 RX ADMIN — Medication 50 MILLIGRAM(S): at 21:04

## 2024-04-21 RX ADMIN — Medication 0.5 MILLIGRAM(S): at 08:05

## 2024-04-21 RX ADMIN — ZIPRASIDONE HYDROCHLORIDE 60 MILLIGRAM(S): 20 CAPSULE ORAL at 08:05

## 2024-04-21 RX ADMIN — ZIPRASIDONE HYDROCHLORIDE 100 MILLIGRAM(S): 20 CAPSULE ORAL at 21:22

## 2024-04-21 NOTE — BH INPATIENT PSYCHIATRY PROGRESS NOTE - NSBHASSESSSUMMFT_PSY_ALL_CORE
Pt is a 57yo F, domiciled in [place], with 2 daughters, grandson, sister, and brother-in-law, unemployed on disability, PPHx dx of schizophrenia, hx of 3 prior psychiatric hospitalizations (most recent in 2017 at [facility]), currently in outpatient treatment with Dr. Ani Rainey ([phone]), no hx of SA/NSSI, no hx of violence/aggression, no substance misuse, no access to firearms, who presents to [facility] 4/17/23 referred by psychiatrist and accompanies by daughter for worsening auditory hallucinations.    >Legal: 9.13 VOL  >Obs: Routine, no current SI. no need for CO, patient not expected to pose risk to self or others in controlled inpatient setting  >Psychiatric Meds: Start Ablify 5mg PO at bedtime (psychosis) and continue Geodon 60mg PO daily and 100mg PO at bedtime (psychosis) and Klonopin 0.5mg PO BID (anxiety) for now  >Labs: Admission labs reviewed, no acute findings.   >Medical:  No acute concerns. No consultations needed at this time.  >Social: milieu/structured therapy  >Treatment Interventions: Groups and Individual Therapy/CBT  >Dispo: pending remission of sx Pt is a 59yo F, domiciled in [place], with 2 daughters, grandson, sister, and brother-in-law, unemployed on disability, PPHx dx of schizophrenia, hx of 3 prior psychiatric hospitalizations (most recent in 2017 at [facility]), currently in outpatient treatment with Dr. Ani Rainey ([phone]), no hx of SA/NSSI, no hx of violence/aggression, no substance misuse, no access to firearms, who presents to [facility] 4/17/23 referred by psychiatrist and accompanies by daughter for worsening auditory hallucinations.    >Legal: 9.13 VOL  >Obs: Routine, no current SI. no need for CO, patient not expected to pose risk to self or others in controlled inpatient setting  >Psychiatric Meds: Titrate Abilify to 10mg PO at bedtime (psychosis) and continue Geodon 60mg PO daily and 100mg PO at bedtime (psychosis) and Klonopin 0.5mg PO BID (anxiety) for now  >Labs: Admission labs reviewed, no acute findings.   >Medical:  No acute concerns. No consultations needed at this time.  >Social: milieu/structured therapy  >Treatment Interventions: Groups and Individual Therapy/CBT  >Dispo: pending remission of sx

## 2024-04-22 LAB — GLUCOSE BLDC GLUCOMTR-MCNC: 113 MG/DL — HIGH (ref 70–99)

## 2024-04-22 PROCEDURE — 99232 SBSQ HOSP IP/OBS MODERATE 35: CPT

## 2024-04-22 RX ADMIN — METFORMIN HYDROCHLORIDE 500 MILLIGRAM(S): 850 TABLET ORAL at 08:19

## 2024-04-22 RX ADMIN — Medication 5 MILLIGRAM(S): at 20:15

## 2024-04-22 RX ADMIN — ATORVASTATIN CALCIUM 10 MILLIGRAM(S): 80 TABLET, FILM COATED ORAL at 20:15

## 2024-04-22 RX ADMIN — Medication 0.5 MILLIGRAM(S): at 20:14

## 2024-04-22 RX ADMIN — Medication 50 MILLIGRAM(S): at 20:15

## 2024-04-22 RX ADMIN — ARIPIPRAZOLE 10 MILLIGRAM(S): 15 TABLET ORAL at 20:15

## 2024-04-22 RX ADMIN — ZIPRASIDONE HYDROCHLORIDE 60 MILLIGRAM(S): 20 CAPSULE ORAL at 08:19

## 2024-04-22 RX ADMIN — Medication 5 MILLIGRAM(S): at 08:19

## 2024-04-22 RX ADMIN — Medication 0.5 MILLIGRAM(S): at 08:20

## 2024-04-22 RX ADMIN — ZIPRASIDONE HYDROCHLORIDE 100 MILLIGRAM(S): 20 CAPSULE ORAL at 20:16

## 2024-04-22 NOTE — BH INPATIENT PSYCHIATRY PROGRESS NOTE - NSBHASSESSSUMMFT_PSY_ALL_CORE
Pt is a 59yo F, domiciled in [place], with 2 daughters, grandson, sister, and brother-in-law, unemployed on disability, PPHx dx of schizophrenia, hx of 3 prior psychiatric hospitalizations (most recent in 2017 at [facility]), currently in outpatient treatment with Dr. Ani Rainey ([phone]), no hx of SA/NSSI, no hx of violence/aggression, no substance misuse, no access to firearms, who presents to [facility] 4/17/23 referred by psychiatrist and accompanies by daughter for worsening auditory hallucinations.    Pt with paranoia and reports she would have AH at home around her brother and law and neighbors    >Legal: 9.13 VOL  >Obs: Routine, no current SI. no need for CO, patient not expected to pose risk to self or others in controlled inpatient setting  >Psychiatric Meds: Titrate Abilify to 10mg PO at bedtime (psychosis) and continue Geodon 60mg PO daily and 100mg PO at bedtime (psychosis) and Klonopin 0.5mg PO BID (anxiety) for now  >Labs: Admission labs reviewed, no acute findings.   >Medical:  No acute concerns. No consultations needed at this time.  >Social: milieu/structured therapy  >Treatment Interventions: Groups and Individual Therapy/CBT  >Dispo: pending remission of sx

## 2024-04-23 LAB
GLUCOSE BLDC GLUCOMTR-MCNC: 101 MG/DL — HIGH (ref 70–99)
GLUCOSE BLDC GLUCOMTR-MCNC: 123 MG/DL — HIGH (ref 70–99)
GLUCOSE BLDC GLUCOMTR-MCNC: 88 MG/DL — SIGNIFICANT CHANGE UP (ref 70–99)

## 2024-04-23 PROCEDURE — 99232 SBSQ HOSP IP/OBS MODERATE 35: CPT

## 2024-04-23 RX ORDER — ARIPIPRAZOLE 15 MG/1
15 TABLET ORAL AT BEDTIME
Refills: 0 | Status: DISCONTINUED | OUTPATIENT
Start: 2024-04-23 | End: 2024-04-26

## 2024-04-23 RX ADMIN — Medication 0.5 MILLIGRAM(S): at 20:26

## 2024-04-23 RX ADMIN — METFORMIN HYDROCHLORIDE 500 MILLIGRAM(S): 850 TABLET ORAL at 08:38

## 2024-04-23 RX ADMIN — Medication 50 MILLIGRAM(S): at 20:26

## 2024-04-23 RX ADMIN — Medication 0.5 MILLIGRAM(S): at 08:38

## 2024-04-23 RX ADMIN — ARIPIPRAZOLE 15 MILLIGRAM(S): 15 TABLET ORAL at 20:26

## 2024-04-23 RX ADMIN — ATORVASTATIN CALCIUM 10 MILLIGRAM(S): 80 TABLET, FILM COATED ORAL at 20:26

## 2024-04-23 RX ADMIN — ZIPRASIDONE HYDROCHLORIDE 60 MILLIGRAM(S): 20 CAPSULE ORAL at 08:39

## 2024-04-23 RX ADMIN — ZIPRASIDONE HYDROCHLORIDE 100 MILLIGRAM(S): 20 CAPSULE ORAL at 20:26

## 2024-04-23 RX ADMIN — Medication 5 MILLIGRAM(S): at 20:26

## 2024-04-23 RX ADMIN — Medication 5 MILLIGRAM(S): at 08:39

## 2024-04-23 NOTE — BH INPATIENT PSYCHIATRY PROGRESS NOTE - NSBHASSESSSUMMFT_PSY_ALL_CORE
Pt is a 57yo F, domiciled in [place], with 2 daughters, grandson, sister, and brother-in-law, unemployed on disability, PPHx dx of schizophrenia, hx of 3 prior psychiatric hospitalizations (most recent in 2017 at [facility]), currently in outpatient treatment with Dr. Ani Rainey ([phone]), no hx of SA/NSSI, no hx of violence/aggression, no substance misuse, no access to firearms, who presents to [facility] 4/17/23 referred by psychiatrist and accompanies by daughter for worsening auditory hallucinations.    Pt with paranoia and initially denies AH then reports AH to nursing    >Legal: 9.13 VOL  >Obs: Routine, no current SI. no need for CO, patient not expected to pose risk to self or others in controlled inpatient setting  >Psychiatric Meds: Titrate Abilify to 15mg PO at bedtime (psychosis) and continue Geodon 60mg PO daily and 100mg PO at bedtime (psychosis) and Klonopin 0.5mg PO BID (anxiety) for now  >Labs: Admission labs reviewed, no acute findings.   >Medical:  No acute concerns. No consultations needed at this time.  >Social: milieu/structured therapy  >Treatment Interventions: Groups and Individual Therapy/CBT  >Dispo: pending remission of sx

## 2024-04-24 LAB
GLUCOSE BLDC GLUCOMTR-MCNC: 113 MG/DL — HIGH (ref 70–99)
GLUCOSE BLDC GLUCOMTR-MCNC: 122 MG/DL — HIGH (ref 70–99)
GLUCOSE BLDC GLUCOMTR-MCNC: 154 MG/DL — HIGH (ref 70–99)
GLUCOSE BLDC GLUCOMTR-MCNC: 93 MG/DL — SIGNIFICANT CHANGE UP (ref 70–99)

## 2024-04-24 PROCEDURE — 99232 SBSQ HOSP IP/OBS MODERATE 35: CPT

## 2024-04-24 PROCEDURE — 90832 PSYTX W PT 30 MINUTES: CPT | Mod: 59

## 2024-04-24 PROCEDURE — 90853 GROUP PSYCHOTHERAPY: CPT

## 2024-04-24 RX ORDER — CLONAZEPAM 1 MG
0.5 TABLET ORAL
Refills: 0 | Status: DISCONTINUED | OUTPATIENT
Start: 2024-04-24 | End: 2024-05-01

## 2024-04-24 RX ADMIN — Medication 0.5 MILLIGRAM(S): at 20:18

## 2024-04-24 RX ADMIN — ZIPRASIDONE HYDROCHLORIDE 60 MILLIGRAM(S): 20 CAPSULE ORAL at 08:59

## 2024-04-24 RX ADMIN — ATORVASTATIN CALCIUM 10 MILLIGRAM(S): 80 TABLET, FILM COATED ORAL at 20:18

## 2024-04-24 RX ADMIN — ARIPIPRAZOLE 15 MILLIGRAM(S): 15 TABLET ORAL at 20:18

## 2024-04-24 RX ADMIN — Medication 50 MILLIGRAM(S): at 20:18

## 2024-04-24 RX ADMIN — ZIPRASIDONE HYDROCHLORIDE 100 MILLIGRAM(S): 20 CAPSULE ORAL at 20:18

## 2024-04-24 RX ADMIN — Medication 0.5 MILLIGRAM(S): at 08:58

## 2024-04-24 RX ADMIN — Medication 5 MILLIGRAM(S): at 20:18

## 2024-04-24 RX ADMIN — METFORMIN HYDROCHLORIDE 500 MILLIGRAM(S): 850 TABLET ORAL at 08:58

## 2024-04-24 RX ADMIN — Medication 5 MILLIGRAM(S): at 08:59

## 2024-04-24 NOTE — BH INPATIENT PSYCHIATRY PROGRESS NOTE - NSBHASSESSSUMMFT_PSY_ALL_CORE
Pt is a 57yo F, domiciled in [place], with 2 daughters, grandson, sister, and brother-in-law, unemployed on disability, PPHx dx of schizophrenia, hx of 3 prior psychiatric hospitalizations (most recent in 2017 at [facility]), currently in outpatient treatment with Dr. Ani Rainey ([phone]), no hx of SA/NSSI, no hx of violence/aggression, no substance misuse, no access to firearms, who presents to [facility] 4/17/23 referred by psychiatrist and accompanies by daughter for worsening auditory hallucinations.    Pt with paranoia and and AH    >Legal: 9.13 VOL  >Obs: Routine, no current SI. no need for CO, patient not expected to pose risk to self or others in controlled inpatient setting  >Psychiatric Meds: Titrate Abilify to 15mg PO at bedtime (psychosis) and continue Geodon 60mg PO daily and 100mg PO at bedtime (psychosis) and Klonopin 0.5mg PO BID (anxiety) for now  >Labs: Admission labs reviewed, no acute findings.   >Medical:  No acute concerns. No consultations needed at this time.  >Social: milieu/structured therapy  >Treatment Interventions: Groups and Individual Therapy/CBT  >Dispo: pending remission of sx

## 2024-04-25 LAB
GLUCOSE BLDC GLUCOMTR-MCNC: 101 MG/DL — HIGH (ref 70–99)
GLUCOSE BLDC GLUCOMTR-MCNC: 118 MG/DL — HIGH (ref 70–99)
GLUCOSE BLDC GLUCOMTR-MCNC: 138 MG/DL — HIGH (ref 70–99)

## 2024-04-25 PROCEDURE — 90832 PSYTX W PT 30 MINUTES: CPT | Mod: 59

## 2024-04-25 PROCEDURE — 99232 SBSQ HOSP IP/OBS MODERATE 35: CPT

## 2024-04-25 PROCEDURE — 90853 GROUP PSYCHOTHERAPY: CPT

## 2024-04-25 RX ADMIN — ATORVASTATIN CALCIUM 10 MILLIGRAM(S): 80 TABLET, FILM COATED ORAL at 20:02

## 2024-04-25 RX ADMIN — Medication 5 MILLIGRAM(S): at 20:02

## 2024-04-25 RX ADMIN — METFORMIN HYDROCHLORIDE 500 MILLIGRAM(S): 850 TABLET ORAL at 08:14

## 2024-04-25 RX ADMIN — Medication 0.5 MILLIGRAM(S): at 08:14

## 2024-04-25 RX ADMIN — Medication 50 MILLIGRAM(S): at 20:02

## 2024-04-25 RX ADMIN — ARIPIPRAZOLE 15 MILLIGRAM(S): 15 TABLET ORAL at 20:02

## 2024-04-25 RX ADMIN — Medication 0.5 MILLIGRAM(S): at 20:02

## 2024-04-25 RX ADMIN — ZIPRASIDONE HYDROCHLORIDE 100 MILLIGRAM(S): 20 CAPSULE ORAL at 20:02

## 2024-04-25 RX ADMIN — Medication 5 MILLIGRAM(S): at 08:14

## 2024-04-25 RX ADMIN — ZIPRASIDONE HYDROCHLORIDE 60 MILLIGRAM(S): 20 CAPSULE ORAL at 08:16

## 2024-04-25 NOTE — BH INPATIENT PSYCHIATRY PROGRESS NOTE - NSBHASSESSSUMMFT_PSY_ALL_CORE
Pt is a 59yo F, domiciled in [place], with 2 daughters, grandson, sister, and brother-in-law, unemployed on disability, PPHx dx of schizophrenia, hx of 3 prior psychiatric hospitalizations (most recent in 2017 at [facility]), currently in outpatient treatment with Dr. Ani Rainey ([phone]), no hx of SA/NSSI, no hx of violence/aggression, no substance misuse, no access to firearms, who presents to [facility] 4/17/23 referred by psychiatrist and accompanies by daughter for worsening auditory hallucinations.    Pt with paranoia and suspected AH, reports if she went home she would not feel safe and the voices would be there    >Legal: 9.13 VOL  >Obs: Routine, no current SI. no need for CO, patient not expected to pose risk to self or others in controlled inpatient setting  >Psychiatric Meds: Titrate Abilify to 15mg PO at bedtime (psychosis) and continue Geodon 60mg PO daily and 100mg PO at bedtime (psychosis) and Klonopin 0.5mg PO BID (anxiety) for now  >Labs: Admission labs reviewed, no acute findings.   >Medical:  No acute concerns. No consultations needed at this time.  >Social: milieu/structured therapy  >Treatment Interventions: Groups and Individual Therapy/CBT  >Dispo: pending remission of sx

## 2024-04-26 LAB
GLUCOSE BLDC GLUCOMTR-MCNC: 105 MG/DL — HIGH (ref 70–99)
GLUCOSE BLDC GLUCOMTR-MCNC: 122 MG/DL — HIGH (ref 70–99)
GLUCOSE BLDC GLUCOMTR-MCNC: 74 MG/DL — SIGNIFICANT CHANGE UP (ref 70–99)

## 2024-04-26 PROCEDURE — 90853 GROUP PSYCHOTHERAPY: CPT

## 2024-04-26 PROCEDURE — 99232 SBSQ HOSP IP/OBS MODERATE 35: CPT

## 2024-04-26 RX ORDER — ARIPIPRAZOLE 15 MG/1
20 TABLET ORAL AT BEDTIME
Refills: 0 | Status: DISCONTINUED | OUTPATIENT
Start: 2024-04-26 | End: 2024-04-30

## 2024-04-26 RX ADMIN — ATORVASTATIN CALCIUM 10 MILLIGRAM(S): 80 TABLET, FILM COATED ORAL at 20:04

## 2024-04-26 RX ADMIN — Medication 5 MILLIGRAM(S): at 20:05

## 2024-04-26 RX ADMIN — ARIPIPRAZOLE 20 MILLIGRAM(S): 15 TABLET ORAL at 20:04

## 2024-04-26 RX ADMIN — Medication 0.5 MILLIGRAM(S): at 08:19

## 2024-04-26 RX ADMIN — ZIPRASIDONE HYDROCHLORIDE 60 MILLIGRAM(S): 20 CAPSULE ORAL at 08:19

## 2024-04-26 RX ADMIN — Medication 50 MILLIGRAM(S): at 20:05

## 2024-04-26 RX ADMIN — ZIPRASIDONE HYDROCHLORIDE 100 MILLIGRAM(S): 20 CAPSULE ORAL at 20:04

## 2024-04-26 RX ADMIN — Medication 5 MILLIGRAM(S): at 08:19

## 2024-04-26 RX ADMIN — Medication 0.5 MILLIGRAM(S): at 20:04

## 2024-04-26 RX ADMIN — METFORMIN HYDROCHLORIDE 500 MILLIGRAM(S): 850 TABLET ORAL at 08:19

## 2024-04-26 NOTE — BH INPATIENT PSYCHIATRY PROGRESS NOTE - NSBHASSESSSUMMFT_PSY_ALL_CORE
Pt is a 59yo F, domiciled in [place], with 2 daughters, grandson, sister, and brother-in-law, unemployed on disability, PPHx dx of schizophrenia, hx of 3 prior psychiatric hospitalizations (most recent in 2017 at [facility]), currently in outpatient treatment with Dr. Ani Rainey ([phone]), no hx of SA/NSSI, no hx of violence/aggression, no substance misuse, no access to firearms, who presents to [facility] 4/17/23 referred by psychiatrist and accompanies by daughter for worsening auditory hallucinations.    Pt with paranoia and intermittent AH, reports if she went home she would not feel safe and the voices would be worse.  She is paranoid about her neighbors    >Legal: 9.13 VOL  >Obs: Routine, no current SI. no need for CO, patient not expected to pose risk to self or others in controlled inpatient setting  >Psychiatric Meds: Titrate Abilify to 20mg PO at bedtime (psychosis) and continue Geodon 60mg PO daily and 100mg PO at bedtime (psychosis) and Klonopin 0.5mg PO BID (anxiety) for now  >Labs: Admission labs reviewed, no acute findings.   >Medical:  No acute concerns. No consultations needed at this time.  >Social: milieu/structured therapy  >Treatment Interventions: Groups and Individual Therapy/CBT  >Dispo: pending remission of sx

## 2024-04-27 LAB
GLUCOSE BLDC GLUCOMTR-MCNC: 125 MG/DL — HIGH (ref 70–99)
GLUCOSE BLDC GLUCOMTR-MCNC: 137 MG/DL — HIGH (ref 70–99)

## 2024-04-27 RX ADMIN — ZIPRASIDONE HYDROCHLORIDE 100 MILLIGRAM(S): 20 CAPSULE ORAL at 20:27

## 2024-04-27 RX ADMIN — Medication 0.5 MILLIGRAM(S): at 08:35

## 2024-04-27 RX ADMIN — Medication 5 MILLIGRAM(S): at 08:35

## 2024-04-27 RX ADMIN — ZIPRASIDONE HYDROCHLORIDE 60 MILLIGRAM(S): 20 CAPSULE ORAL at 08:35

## 2024-04-27 RX ADMIN — METFORMIN HYDROCHLORIDE 500 MILLIGRAM(S): 850 TABLET ORAL at 08:35

## 2024-04-27 RX ADMIN — ATORVASTATIN CALCIUM 10 MILLIGRAM(S): 80 TABLET, FILM COATED ORAL at 20:27

## 2024-04-27 RX ADMIN — Medication 5 MILLIGRAM(S): at 20:27

## 2024-04-27 RX ADMIN — Medication 50 MILLIGRAM(S): at 20:27

## 2024-04-27 RX ADMIN — Medication 0.5 MILLIGRAM(S): at 20:27

## 2024-04-27 RX ADMIN — ARIPIPRAZOLE 20 MILLIGRAM(S): 15 TABLET ORAL at 20:27

## 2024-04-28 RX ADMIN — ARIPIPRAZOLE 20 MILLIGRAM(S): 15 TABLET ORAL at 20:15

## 2024-04-28 RX ADMIN — Medication 5 MILLIGRAM(S): at 08:56

## 2024-04-28 RX ADMIN — Medication 5 MILLIGRAM(S): at 20:16

## 2024-04-28 RX ADMIN — METFORMIN HYDROCHLORIDE 500 MILLIGRAM(S): 850 TABLET ORAL at 10:54

## 2024-04-28 RX ADMIN — Medication 0.5 MILLIGRAM(S): at 08:56

## 2024-04-28 RX ADMIN — ZIPRASIDONE HYDROCHLORIDE 100 MILLIGRAM(S): 20 CAPSULE ORAL at 20:15

## 2024-04-28 RX ADMIN — Medication 0.5 MILLIGRAM(S): at 20:15

## 2024-04-28 RX ADMIN — ATORVASTATIN CALCIUM 10 MILLIGRAM(S): 80 TABLET, FILM COATED ORAL at 20:15

## 2024-04-28 RX ADMIN — Medication 50 MILLIGRAM(S): at 20:16

## 2024-04-28 RX ADMIN — ZIPRASIDONE HYDROCHLORIDE 60 MILLIGRAM(S): 20 CAPSULE ORAL at 08:56

## 2024-04-29 LAB
GLUCOSE BLDC GLUCOMTR-MCNC: 106 MG/DL — HIGH (ref 70–99)
GLUCOSE BLDC GLUCOMTR-MCNC: 111 MG/DL — HIGH (ref 70–99)
GLUCOSE BLDC GLUCOMTR-MCNC: 115 MG/DL — HIGH (ref 70–99)
GLUCOSE BLDC GLUCOMTR-MCNC: 225 MG/DL — HIGH (ref 70–99)

## 2024-04-29 PROCEDURE — 99232 SBSQ HOSP IP/OBS MODERATE 35: CPT

## 2024-04-29 RX ADMIN — Medication 0.5 MILLIGRAM(S): at 08:31

## 2024-04-29 RX ADMIN — ZIPRASIDONE HYDROCHLORIDE 60 MILLIGRAM(S): 20 CAPSULE ORAL at 08:31

## 2024-04-29 RX ADMIN — ATORVASTATIN CALCIUM 10 MILLIGRAM(S): 80 TABLET, FILM COATED ORAL at 20:02

## 2024-04-29 RX ADMIN — Medication 5 MILLIGRAM(S): at 20:02

## 2024-04-29 RX ADMIN — ZIPRASIDONE HYDROCHLORIDE 100 MILLIGRAM(S): 20 CAPSULE ORAL at 20:02

## 2024-04-29 RX ADMIN — Medication 50 MILLIGRAM(S): at 20:02

## 2024-04-29 RX ADMIN — METFORMIN HYDROCHLORIDE 500 MILLIGRAM(S): 850 TABLET ORAL at 08:31

## 2024-04-29 RX ADMIN — ARIPIPRAZOLE 20 MILLIGRAM(S): 15 TABLET ORAL at 20:02

## 2024-04-29 RX ADMIN — Medication 0.5 MILLIGRAM(S): at 20:01

## 2024-04-29 RX ADMIN — Medication 5 MILLIGRAM(S): at 08:30

## 2024-04-29 NOTE — BH INPATIENT PSYCHIATRY PROGRESS NOTE - NSBHASSESSSUMMFT_PSY_ALL_CORE
Pt is a 59yo F, domiciled in [place], with 2 daughters, grandson, sister, and brother-in-law, unemployed on disability, PPHx dx of schizophrenia, hx of 3 prior psychiatric hospitalizations (most recent in 2017 at [facility]), currently in outpatient treatment with Dr. Ain Rainey ([phone]), no hx of SA/NSSI, no hx of violence/aggression, no substance misuse, no access to firearms, who presents to [facility] 4/17/23 referred by psychiatrist and accompanies by daughter for worsening auditory hallucinations.    Pt with paranoia and without AH today, reports if she went home she does not know if the voices would come back.  She is paranoid about her neighbors    >Legal: 9.13 VOL  >Obs: Routine, no current SI. no need for CO, patient not expected to pose risk to self or others in controlled inpatient setting  >Psychiatric Meds: Titrate Abilify to 20mg PO at bedtime (psychosis) and continue Geodon 60mg PO daily and 100mg PO at bedtime (psychosis) and Klonopin 0.5mg PO BID (anxiety) for now  >Labs: Admission labs reviewed, no acute findings.   >Medical:  No acute concerns. No consultations needed at this time.  >Social: milieu/structured therapy  >Treatment Interventions: Groups and Individual Therapy/CBT  >Dispo: pending remission of sx

## 2024-04-30 LAB
GLUCOSE BLDC GLUCOMTR-MCNC: 116 MG/DL — HIGH (ref 70–99)
GLUCOSE BLDC GLUCOMTR-MCNC: 120 MG/DL — HIGH (ref 70–99)
GLUCOSE BLDC GLUCOMTR-MCNC: 147 MG/DL — HIGH (ref 70–99)
GLUCOSE BLDC GLUCOMTR-MCNC: 59 MG/DL — LOW (ref 70–99)
GLUCOSE BLDC GLUCOMTR-MCNC: 68 MG/DL — LOW (ref 70–99)
GLUCOSE BLDC GLUCOMTR-MCNC: 86 MG/DL — SIGNIFICANT CHANGE UP (ref 70–99)

## 2024-04-30 PROCEDURE — 99232 SBSQ HOSP IP/OBS MODERATE 35: CPT

## 2024-04-30 PROCEDURE — 93010 ELECTROCARDIOGRAM REPORT: CPT

## 2024-04-30 RX ORDER — ARIPIPRAZOLE 15 MG/1
25 TABLET ORAL AT BEDTIME
Refills: 0 | Status: DISCONTINUED | OUTPATIENT
Start: 2024-04-30 | End: 2024-05-03

## 2024-04-30 RX ADMIN — METFORMIN HYDROCHLORIDE 500 MILLIGRAM(S): 850 TABLET ORAL at 08:13

## 2024-04-30 RX ADMIN — Medication 50 MILLIGRAM(S): at 20:36

## 2024-04-30 RX ADMIN — Medication 5 MILLIGRAM(S): at 08:14

## 2024-04-30 RX ADMIN — Medication 5 MILLIGRAM(S): at 20:36

## 2024-04-30 RX ADMIN — Medication 0.5 MILLIGRAM(S): at 08:14

## 2024-04-30 RX ADMIN — ARIPIPRAZOLE 25 MILLIGRAM(S): 15 TABLET ORAL at 20:36

## 2024-04-30 RX ADMIN — ATORVASTATIN CALCIUM 10 MILLIGRAM(S): 80 TABLET, FILM COATED ORAL at 20:36

## 2024-04-30 RX ADMIN — ZIPRASIDONE HYDROCHLORIDE 100 MILLIGRAM(S): 20 CAPSULE ORAL at 20:38

## 2024-04-30 RX ADMIN — Medication 0.5 MILLIGRAM(S): at 20:36

## 2024-04-30 RX ADMIN — ZIPRASIDONE HYDROCHLORIDE 60 MILLIGRAM(S): 20 CAPSULE ORAL at 08:14

## 2024-04-30 NOTE — BH DISCHARGE NOTE NURSING/SOCIAL WORK/PSYCH REHAB - NSCDUDCCRISIS_PSY_A_CORE
[facility]  [phone]) [facility] ([phone])  Text "WELL" to [phone]  Website: [email or web address]  [facility]  [address] [place] [address] [phone]   Hours:  Monday through Friday from 9 AM to 3 PM/[facility]

## 2024-04-30 NOTE — BH DISCHARGE NOTE NURSING/SOCIAL WORK/PSYCH REHAB - NSDCPRGOAL_PSY_ALL_CORE
Writer met with patient to discuss progress towards their goal. Patient was calm and engaged throughout the meeting. Patient was admitted to 6 due to worsening auditory hallucinations. Patient reported she is feeling brighter and is looking forward to being discharged today. Patient reports she is no longer hearing voices. Patient reported she is nervous that once she gets home, they will start again. Patient reported she is compliant with her medications. Patient ADLs are good. Patient reported her sleep and appetite is fair. Patient was able to identify reading and listening to music as coping skills to help mitigate her hallucinations. Patient is visible on the unit and is selectively social with her peers. Patient attends majority of Psychiatric Rehabilitation groups and participates. Patient denies SI/HI/AH/VH. Patient was giving a press ganey and safety plan before discharge. Overall writer has seen improvements in the patient and encouraged her to continue utilizing her coping skills to help with symptoms.

## 2024-04-30 NOTE — BH INPATIENT PSYCHIATRY PROGRESS NOTE - NSBHASSESSSUMMFT_PSY_ALL_CORE
Pt is a 59yo F, domiciled in [place], with 2 daughters, grandson, sister, and brother-in-law, unemployed on disability, PPHx dx of schizophrenia, hx of 3 prior psychiatric hospitalizations (most recent in 2017 at [facility]), currently in outpatient treatment with Dr. Ani Rainey ([phone]), no hx of SA/NSSI, no hx of violence/aggression, no substance misuse, no access to firearms, who presents to [facility] 4/17/23 referred by psychiatrist and accompanies by daughter for worsening auditory hallucinations.    Pt with paranoia and without AH today, reports if she went home she does not know if the voices would come back but she now is feeling more safe to go home, reporting most of her family is supportive and provide reality testing when she hears voices    >Legal: 9.13 VOL  >Obs: Routine, no current SI. no need for CO, patient not expected to pose risk to self or others in controlled inpatient setting  >Psychiatric Meds: Titrate Abilify to 25mg PO at bedtime (psychosis) and continue Geodon 60mg PO daily and 100mg PO at bedtime (psychosis) and Klonopin 0.5mg PO BID (anxiety) for now  >Labs: Admission labs reviewed, no acute findings.   >Medical:  No acute concerns. No consultations needed at this time.  >Social: milieu/structured therapy  >Treatment Interventions: Groups and Individual Therapy/CBT  >Dispo: pending remission of sx

## 2024-04-30 NOTE — BH DISCHARGE NOTE NURSING/SOCIAL WORK/PSYCH REHAB - NSDCSUICIDEPREP_PSY_ALL_CORE
PRE-SEDATION ASSESSMENT    CONSENT  Risks, benefits, and alternatives have been discussed with the patient/patient representative, and patient/patient representative agrees to proceed: Yes    MEDICAL HISTORY  Significant medical/surgical history: No  Past Complications with Sedation/Anesthesia: No  Significant Family History: No  Smoking History: No  Alcohol/Drug abuse: No  Possible Pregnancy (LMP): No  Cardiac History: No  Respiratory History: No    PHYSICAL EXAM  History and Physical Reviewed: Patient has valid H&P within 30 days. I have reviewed and there are no changes.  Airway Risk History: No previous complications  Airway Anatomy : Class II  Heart : Normal  Lungs : Normal  LOC/Mental Status : Normal    OTHER FINDINGS  Reviewed current medications and allergies: Yes  Pertinent lab/diagnostic test reviewed: Yes    SEDATION RISK ASSESSMENT  Risk Status ASA: Class II - Normal patient with mild systemic disease  Plan for Sedation: Moderate Sedation  Indications for Procedure/Pre-Procedure Diagnosis and Planned Procedure: knee rfa  EKG Monitoring: Yes    NARRATIVE FINDINGS      No

## 2024-04-30 NOTE — BH DISCHARGE NOTE NURSING/SOCIAL WORK/PSYCH REHAB - PATIENT PORTAL LINK FT
You can access the FollowMyHealth Patient Portal offered by [facility] by registering at the following website: [email or web address]. By joining [facility]’s FollowMyHealth portal, you will also be able to view your health information using other applications (apps) compatible with our system.

## 2024-04-30 NOTE — BH DISCHARGE NOTE NURSING/SOCIAL WORK/PSYCH REHAB - DISCHARGE INSTRUCTIONS AFTERCARE APPOINTMENTS
In order to check the location, date, or time of your aftercare appointment, please refer to your Discharge Instructions Document given to you upon leaving the hospital.  If you have lost the instructions please call [phone]

## 2024-04-30 NOTE — BH DISCHARGE NOTE NURSING/SOCIAL WORK/PSYCH REHAB - NSTOBACCOSMKCESSPRO_PSY_ALL_CORE
[facility]  [address] [place] [address]  [phone]

## 2024-05-01 LAB
GLUCOSE BLDC GLUCOMTR-MCNC: 109 MG/DL — HIGH (ref 70–99)
GLUCOSE BLDC GLUCOMTR-MCNC: 116 MG/DL — HIGH (ref 70–99)
GLUCOSE BLDC GLUCOMTR-MCNC: 174 MG/DL — HIGH (ref 70–99)
GLUCOSE BLDC GLUCOMTR-MCNC: 88 MG/DL — SIGNIFICANT CHANGE UP (ref 70–99)

## 2024-05-01 PROCEDURE — 99238 HOSP IP/OBS DSCHRG MGMT 30/<: CPT

## 2024-05-01 PROCEDURE — 90853 GROUP PSYCHOTHERAPY: CPT

## 2024-05-01 PROCEDURE — 99232 SBSQ HOSP IP/OBS MODERATE 35: CPT

## 2024-05-01 PROCEDURE — 90832 PSYTX W PT 30 MINUTES: CPT | Mod: 59

## 2024-05-01 RX ORDER — LORATADINE 10 MG/1
1 TABLET ORAL
Qty: 0 | Refills: 0 | DISCHARGE

## 2024-05-01 RX ORDER — ARIPIPRAZOLE 15 MG/1
1 TABLET ORAL
Qty: 0 | Refills: 0 | DISCHARGE
Start: 2024-05-01

## 2024-05-01 RX ORDER — CLONAZEPAM 1 MG
1 TABLET ORAL
Qty: 0 | Refills: 0 | DISCHARGE
Start: 2024-05-01

## 2024-05-01 RX ORDER — CLONAZEPAM 1 MG
0.5 TABLET ORAL
Refills: 0 | Status: DISCONTINUED | OUTPATIENT
Start: 2024-05-01 | End: 2024-05-03

## 2024-05-01 RX ORDER — ZIPRASIDONE HYDROCHLORIDE 20 MG/1
5 CAPSULE ORAL
Qty: 0 | Refills: 0 | DISCHARGE
Start: 2024-05-01

## 2024-05-01 RX ORDER — OXYBUTYNIN CHLORIDE 5 MG
1 TABLET ORAL
Qty: 0 | Refills: 0 | DISCHARGE
Start: 2024-05-01

## 2024-05-01 RX ORDER — FERROUS SULFATE 325(65) MG
1 TABLET ORAL
Qty: 0 | Refills: 0 | DISCHARGE

## 2024-05-01 RX ORDER — ATORVASTATIN CALCIUM 80 MG/1
1 TABLET, FILM COATED ORAL
Qty: 0 | Refills: 0 | DISCHARGE
Start: 2024-05-01

## 2024-05-01 RX ORDER — ZIPRASIDONE HYDROCHLORIDE 20 MG/1
1 CAPSULE ORAL
Qty: 0 | Refills: 0 | DISCHARGE
Start: 2024-05-01

## 2024-05-01 RX ORDER — METFORMIN HYDROCHLORIDE 850 MG/1
1 TABLET ORAL
Qty: 0 | Refills: 0 | DISCHARGE
Start: 2024-05-01

## 2024-05-01 RX ORDER — OXYBUTYNIN CHLORIDE 5 MG
1 TABLET ORAL
Qty: 0 | Refills: 0 | DISCHARGE

## 2024-05-01 RX ADMIN — Medication 50 MILLIGRAM(S): at 20:24

## 2024-05-01 RX ADMIN — ARIPIPRAZOLE 25 MILLIGRAM(S): 15 TABLET ORAL at 20:24

## 2024-05-01 RX ADMIN — Medication 0.5 MILLIGRAM(S): at 08:17

## 2024-05-01 RX ADMIN — Medication 0.5 MILLIGRAM(S): at 20:24

## 2024-05-01 RX ADMIN — METFORMIN HYDROCHLORIDE 500 MILLIGRAM(S): 850 TABLET ORAL at 08:17

## 2024-05-01 RX ADMIN — ZIPRASIDONE HYDROCHLORIDE 100 MILLIGRAM(S): 20 CAPSULE ORAL at 20:27

## 2024-05-01 RX ADMIN — Medication 5 MILLIGRAM(S): at 08:17

## 2024-05-01 RX ADMIN — ZIPRASIDONE HYDROCHLORIDE 60 MILLIGRAM(S): 20 CAPSULE ORAL at 08:16

## 2024-05-01 RX ADMIN — Medication 1: at 16:55

## 2024-05-01 RX ADMIN — Medication 5 MILLIGRAM(S): at 20:24

## 2024-05-01 RX ADMIN — ATORVASTATIN CALCIUM 10 MILLIGRAM(S): 80 TABLET, FILM COATED ORAL at 20:24

## 2024-05-01 NOTE — BH INPATIENT PSYCHIATRY DISCHARGE NOTE - NSBHASSESSSUMMFT_PSY_ALL_CORE
Pt is a 59yo F, domiciled in [place], with 2 daughters, grandson, sister, and brother-in-law, unemployed on disability, PPHx dx of schizophrenia, hx of 3 prior psychiatric hospitalizations (most recent in 2017 at [facility]), currently in outpatient treatment with Dr. Ani Rainey ([phone]), no hx of SA/NSSI, no hx of violence/aggression, no substance misuse, no access to firearms, who presents to [facility] 4/17/23 referred by psychiatrist and accompanies by daughter for worsening auditory hallucinations.    Pt with paranoia but it is attenuating and without AH today, reports if she went home she does not know if the voices would come back but she now is feeling more safe to go home.  Pt agrees to do her hobbies to distract herself from her neighbors and any voices that may arise when she goes home    Plan:  >Psychiatric Meds: Abilify to 25mg PO at bedtime (psychosis) and continue Geodon 60mg PO daily and 100mg PO at bedtime (psychosis) and Klonopin 0.5mg PO BID (anxiety)     >Dispo: pt safe to discharge home today with outpatient care

## 2024-05-01 NOTE — BH INPATIENT PSYCHIATRY DISCHARGE NOTE - NSBHDCHANDOFFNOFT_PSY_A_CORE
Left message for call back from provider at [facility] [phone]).  SW to fax discharge summary and can call Antonietta Saravia NP at [phone] for handoff

## 2024-05-01 NOTE — BH INPATIENT PSYCHIATRY DISCHARGE NOTE - HOSPITAL COURSE
On the unit, patient continued to report distressing AH and paranoia that her neighbors want to hurt her.  Pt agreed to try Abilify and it was added to her current medication regimen and titrated to 25mg PO at bedtime.  Geodon 60mg PO daily and 100mg PO at bedtime and Klonopin 0.5mg PO BID were continued.  Pt was also active in group and individual therapy on the unit.  On the combination of medication and therapy, patient had much improvement in psychotic sx.  Pt declined PHP referral, reporting she can go to groups at her current outpatient clinic.  Pt did not know if the AH would come back at home with her neighbors there, but was able to list coping skills and hobbies she can do if she has any triggering thoughts.  Pt's daughter was contacted by the treatment team during her hospitalization and she was in agreement with discharge plan.  Pt's daughter verbalized that she is trying to get pt a place of her own and pt felt better about that.    Although patient was admitted due to risk factors for violence/suicide including psychotic sx, the patient’s risk for suicide/violence was mitigated during her hospitalization and her protective factors outweigh her risk factors at this time.  Pt is currently at low acute risk for suicide/violence.  Patient’s protective factors include:  no current SI/HI/I/P, willingness to engage in treatment, positive therapeutic relationships, family support, no hx of suicide attempts, no hx of aggression, residential stability, no hx of trauma, no hx of substance abuse, and no current acute depressive, psychotic or manic sx.  Although the patient is at chronic risk for violence/suicide given her hx of psychiatric illness and psychiatric hospitalizations, this risk cannot be ameliorated by continued inpatient treatment.  The patient no longer met the criteria for psychiatric hospitalization at discharge.

## 2024-05-01 NOTE — BH INPATIENT PSYCHIATRY PROGRESS NOTE - NSBHASSESSSUMMFT_PSY_ALL_CORE
Pt is a 59yo F, domiciled in [place], with 2 daughters, grandson, sister, and brother-in-law, unemployed on disability, PPHx dx of schizophrenia, hx of 3 prior psychiatric hospitalizations (most recent in 2017 at [facility]), currently in outpatient treatment with Dr. Ani Rainey ([phone]), no hx of SA/NSSI, no hx of violence/aggression, no substance misuse, no access to firearms, who presents to [facility] 4/17/23 referred by psychiatrist and accompanies by daughter for worsening auditory hallucinations.    Pt with paranoia but it is attenuating and without AH today, reports if she went home she does not know if the voices would come back but she now is feeling more safe to go home.  Pt agrees to do her hobbies to distract herself from her neighbors and any voices that may arise when she goes home    >Legal: 9.13 VOL  >Obs: Routine, no current SI. no need for CO, patient not expected to pose risk to self or others in controlled inpatient setting  >Psychiatric Meds: Titrate Abilify to 25mg PO at bedtime (psychosis) and continue Geodon 60mg PO daily and 100mg PO at bedtime (psychosis) and Klonopin 0.5mg PO BID (anxiety) for now  >Labs: Admission labs reviewed, no acute findings.   >Medical:  No acute concerns. No consultations needed at this time.  >Social: milieu/structured therapy  >Treatment Interventions: Groups and Individual Therapy/CBT  >Dispo: pending remission of sx

## 2024-05-01 NOTE — BH INPATIENT PSYCHIATRY DISCHARGE NOTE - HPI (INCLUDE ILLNESS QUALITY, SEVERITY, DURATION, TIMING, CONTEXT, MODIFYING FACTORS, ASSOCIATED SIGNS AND SYMPTOMS)
Per [facility], "Pt is a 59yo F, domiciled in [place], with 2 daughters, grandson, sister, and brother-in-law, unemployed on disability, PPHx dx of schizophrenia, hx of 3 prior psychiatric hospitalizations (most recent in 2017 at [facility]), currently in outpatient treatment with Dr. Ani Rainey ([phone]), no hx of SA/NSSI, no hx of violence/aggression, no substance misuse, no access to firearms, who presents to [facility] 4/17/23 referred by psychiatrist and accompanies by daughter for worsening auditory hallucinations.    Pt reports she has been experiencing worsening AH that are worsening in frequency and intensity. States she is almost constantly bothered by voices in the apartment next door (though no one lives there) that tell her to they are "doing Moravian" on her and are making her sick. States the voices are upsetting and keep her up at night. Also reports she has not been sleeping well due to constant voices and often feeling another body in bed next to her (sees a coat go up and down as if someone is breathing underneath it). This has been frightening to her. States she is getting only a few hours of sleep per night and feels exhausted during the day. She has been very preoccupied with the voices and has not been eating well. Mood has been low.    She has been on ziprasidone since d/c from [facility] in 2017. About 1 month ago, she was started on Vraylar as well to target the above sxs. 5 days ago dose was increased from 4.5 to 6mg qhs. Pt denies improvement in sxs since starting the Vraylar.    No manic sxs observed or reported. Denies SI. Denies HI.    Collateral obtained from daughter Karissa Bauer: She has observed her mom doing worse over the past month. States pt frequently appears distressed due to voices and is having a hard time dealing with it. She is not eating well, despite encouragement from daughter. Daughter reports that she has seen a similar pattern in the past which ultimately lead to hospitalization and she is trying to get her mom help before things become much worse. Current stressors are interpersonal conflict with pt's sister (with whom pt lives). She is concerned about her mother's well being. ""    On the unit, patient reports she has been having worsening AH for the past week.  Pt reports the voices say "all kinds of things" like, "kill me," "the meds won't help," and also says they say they are doing Moravian on her.  Pt denies CAH.  Pt denies SI/HI/I/P or VH.  Pt reports a trigger to the worsening voices is hearing her next door neighbors being loud.  Pt feels they are after her and feels she has to check outside when she hears them.  Pt reports she has not been able to sleep and does not feel safe at home due to this.  Pt denies manic sx.  Pt denies changes in appetite and reports her daughter encourages her to do her ADLs.  Pt reports daily compliance with medication.  Pt reports she does not remember the names of any previous medication she has tried.  Pt denies substance use.

## 2024-05-01 NOTE — BH PSYCHOLOGY - CLINICIAN PSYCHOTHERAPY NOTE - TOKEN PULL-DIAGNOSIS
Primary Diagnosis:  Schizophrenia [F20.9]        Problem Dx:   

## 2024-05-01 NOTE — BH INPATIENT PSYCHIATRY DISCHARGE NOTE - NSBHMETABOLIC_PSY_ALL_CORE_FT
BMI: BMI (kg/m2): 33 (04-17-24 @ 17:54)  HbA1c: A1C with Estimated Average Glucose Result: 6.1 % (04-18-24 @ 08:00)    Glucose: POCT Blood Glucose.: 116 mg/dL (05-01-24 @ 12:14)    BP: 131/73 (04-30-24 @ 07:58) (114/73 - 131/73)Vital Signs Last 24 Hrs  T(C): 36.5 (05-01-24 @ 08:19), Max: 36.5 (05-01-24 @ 08:19)  T(F): 97.7 (05-01-24 @ 08:19), Max: 97.7 (05-01-24 @ 08:19)  HR: --  BP: --  BP(mean): --  RR: --  SpO2: --    Orthostatic VS  05-01-24 @ 08:19  Lying BP: --/-- HR: --  Sitting BP: 121/73 HR: 73  Standing BP: 123/80 HR: 98  Site: --  Mode: --    Lipid Panel: Date/Time: 04-18-24 @ 08:00  Cholesterol, Serum: 152  LDL Cholesterol Calculated: 72  HDL Cholesterol, Serum: 58  Total Cholesterol/HDL Ration Measurement: --  Triglycerides, Serum: 108

## 2024-05-01 NOTE — BH INPATIENT PSYCHIATRY DISCHARGE NOTE - OTHER PAST PSYCHIATRIC HISTORY (INCLUDE DETAILS REGARDING ONSET, COURSE OF ILLNESS, INPATIENT/OUTPATIENT TREATMENT)
As per crisis: Pt is a 57yo F, domiciled in [place], with 2 daughters, grandson, sister, and brother-in-law, unemployed on disability, PPHx dx of schizophrenia, hx of 3 prior psychiatric hospitalizations (most recent in 2017 at [facility]), currently in outpatient treatment with Dr. Ani Rainey ([phone]), no hx of SA/NSSI, no hx of violence/aggression, no substance misuse, no access to firearms, who presents to [facility]/17/23 referred by psychiatrist and accompanies by daughter for worsening auditory hallucinations.      Pt reports she has been experiencing worsening AH that are worsening in frequency and intensity. States she is almost constantly bothered by voices in the apartment next door (though no one lives there) that tell her to they are "doing Judaism" on her and are making her sick. States the voices are upsetting and keep her up at night. Also reports she has not been sleeping well due to constant voices and often feeling another body in bed next to her (sees a coat go up and down as if someone is breathing underneath it). This has been frightening to her. States she is getting only a few hours of sleep per night and feels exhausted during the day. She has been very preoccupied with the voices and has not been eating well. Mood has been low.            Collateral obtained from daughter Karissa Bauer: She has observed her mom doing worse over the past month. States pt frequently appears distressed due to voices and is having a hard time dealing with it. She is not eating well, despite encouragement from daughter. Daughter reports that she has seen a similar pattern in the past which ultimately lead to hospitalization and she is trying to get her mom help before things become much worse. Current stressors are interpersonal conflict with pt's sister (with whom pt lives). She is concerned about her mother's well being. "      On the unit, SW met with the patient with NP present. PT reports she has been having worsening AH for the past week.  Pt reports the voices say "all kinds of things" like, "kill me," "the meds won't help," and also says they say they are doing Judaism on her.    Pt reports a trigger to the worsening voices is hearing her next door neighbors being loud.  Pt feels they are after her and feels she has to check outside when she hears them.  Pt reports she has not been able to sleep and does not feel safe at home due to this. Pt reports daily compliance with medication.  Pt reports she does not remember the names of any previous medication she has tried.  Pt denies substance use. Patient signed consent for tx team to speak with her daughter and outpatient provider.

## 2024-05-01 NOTE — BH PSYCHOLOGY - GROUP THERAPY NOTE - NSPSYCHOLGRPCOGPT_PSY_A_CORE FT
Patient attended recovery oriented/acceptance & commitment therapy group. Group started with check in prompt - what would you say to a friend having a hard time. Pts shared supportive and encouraging words as well as actions they would to help a friend. Group then summarized concept of acceptance and transitioned to showing self-compassion in the practice of acceptance. Members completed “Treat Yourself Like a Friend” worksheet and compared/contrasted the way they would treat a friend v themselves. Members reflected on the harsher judgement and more critical tone they would apply to themselves. We discussed barriers to using self-compassion (worthiness, past traumas) and members showed openness to practicing compassion in subsequent group sessions.  facilitated discussion of concepts, encouraged active participation, and supported members providing feedback to peers.  The group concluded with selection of the value of the day as "self-compassion". 
Patient attended recovery oriented/acceptance & commitment therapy group. Group started with check in question, "What is a skill/something you would like to learn how to do?" Group facilitator briefly reflected on concepts discussed in yesterday's group, and then transitioned into discussion on self-compassion. Explored how self-compassion centers around how we treat ourselves versus others; emphasized the "I" in self-compassion. Provided "Self-Compassion Says" handout wheel. Group members read statements from wheel aloud, reflecting on the barriers to practicing self-compassion, which can often show up in emotions like shame and guilt. Group discussed how we often engage in negative self-talk/more critical toward the way we view ourselves versus the way we would view a friend. Explored how mistakes are a part of the learning process, learning to forgive oneself, maintaining a mind set based within self-worth, as well as how to incorporate self-compassion into a daily practice.  facilitated discussion of concepts, encouraged active participation, and supported members providing feedback to peers.  The group concluded with a summary, and agreed to make the value of the day "self-compassion."
Patient attended recovery oriented/acceptance & commitment therapy group. The group started with the brief check in question: "Do you like the country, city, or beach better?". The group then focused on topics of acceptance and mindfulness via the 5 senses. Patients discussed and engaged with the five senses via various stimuli (e.g., music, lotion). Pts shared examples of their reactions to the stimuli.  facilitated discussion of concepts, encouraged active participation, and supported members providing feedback to peers.  The group concluded with selection of the value of the day as "embodiment". 
Patient attended recovery oriented/acceptance & commitment therapy group. The group started with the brief check in question: "If you could teleport anywhere at any time, where would it be?". The group then focused on topics of acceptance and compassion. Patients requested a space to discuss events that occurred on the unit over the weekend, including watching as well as feeling targeted by other pts. who were aggressive or inappropriate. Pts. shared examples of the ways in which they had personally responded to the events with apprehension, fear, and a reduced sense of safety. Pts. also discussed how they felt empathy toward staff who engaged in managing the behaviors of others on the unit, as well as for those who are struggling. Pts. embraced the dialectic of understanding that other pts on the unit are at different stages of recovery, as well as wanting the unit to feel safe enough to focus on their recovery. Finally, pts. labeled ways in which they could return their attention to their own recovery, even with of their reservations about the current milieu.  facilitated discussion of concepts, offered validation and support, encouraged active participation, and supported members providing feedback to peers.  The group concluded with selecting the value of the day as "empathy".
Patient attended recovery oriented/acceptance & commitment therapy group.? Group focused on cultivating safe space for pts to process recent unit events. Members shared their reactions to recent aggression. Members reported feeling unsettled and scared. Facilitator encouraged active discussion of feelings/thoughts showing up as well as ways to cope. Members actively disclosed their personal reactions and supported one another. Facilitator reflected on the support and honesty showing up in the group space.  facilitated discussion of concepts, encouraged active participation, and supported members providing feedback to peers.
Patient attended recovery oriented/acceptance & commitment therapy group. Group started with gratitude check in and reflected on the weather and people making today “better.” Group then discussed poem called “Allow” to build on group’s understanding of acceptance. Members read the poem and shared their interpretations. This spurred discussion about  facilitated discussion of concepts, encouraged active participation, and supported members providing feedback to peers.
Patient attended recovery oriented/acceptance & commitment therapy group. Group started with check-in prompt - "what is a compassionate action you do for yourself?" Members shared examples like long showers, driving, eating, cooking, and reading. Group centered on visualizing and articulating self-compassion. Members chose statements to write and images to draw on the chalkboard. Members collaborated and supported each other to create a “self compassion” board. We reflected on the importance of practicing self-compassion on a daily basis.  facilitated discussion of concepts, encouraged active participation, and supported members providing feedback to peers. The group concluded with a summary, and agreed to make the value of the day "empathy."
Patient attended recovery oriented/acceptance & commitment therapy group. The group started with the brief check in question "If you could have any superpower, what would it be and why?" The group then focused on concept of radical acceptance, accepting reality versus resisting reality. Patients continued to share examples of how acceptance can be applied to their thoughts, emotions and behaviors both on and off the unit.  facilitated an activity which centered around accepting the present moment; group members were given paper/markers and asked to begin drawing until a timer went off. Group members were asked to pass their drawing to the person to their right, engaged in several rounds until each member ended up with their original drawing.  facilitated discussion of concepts, encouraged active participation, and supported members providing feedback to peers.  The group concluded with a discussion about the value of the day, group members agreed to make "collaboration" the value of the day. 
Patient attended recovery oriented/acceptance & commitment therapy group. The group started with the brief check in question: "Do you like the country, city, or beach better?". The group then focused on topics of acceptance and mindfulness via the 5 senses. Patients discussed and engaged with the five senses via various stimuli (e.g., music, lotion). Pts shared examples of their reactions to the stimuli.  facilitated discussion of concepts, encouraged active participation, and supported members providing feedback to peers.  The group concluded with selection of the value of the day as "embodiment".

## 2024-05-01 NOTE — BH INPATIENT PSYCHIATRY DISCHARGE NOTE - NSBHFUPINTERVALHXFT_PSY_A_CORE
Pt compliant with medication and tolerating it well.  Chart reviewed and case discussed with treatment team.  No events reported overnight.  Pt reports she is not hearing AH and is not sure if they will come back when she goes home.  Pt reports if they do come back, she knows the voices are not her neighbors.  Pt reports feeling less paranoid about her neighbors and feels good about going home.  Pt reports if the voices come back, she will use her coping skills and keep busy to distract herself.  Pt reports her daughter organizes her medications and reminds her to take them.  Pt denies SI/HI/I/P or VH.  Pt eating and sleeping well.  Pt endorses motivation to engage in outpatient care and take her medications.

## 2024-05-01 NOTE — BH PSYCHOLOGY - CLINICIAN PSYCHOTHERAPY NOTE - NSBHPSYCHOLNARRATIVE_PSY_A_CORE FT
Engaged in therapy session. Focused on strategy coaching for distressing voices. Today, pt reported that she is "100% certain that they [voices] are not my neighbors." We engaged in exercise evaluating evidence for and against the voices and pt generated multiple reasons to support her assertion that the voices are not her neighbors. She stated (1) "Because I see them [in real life] and speak to them", (2) "My brother tells me they are not real because no one can hear through walls," (3) "It doesn't make sense that they know what I'm doing like cooking," and (4) "I'm the only one who can hear the voices." Pt stated that not believing the voices are her neighbors allows her to create distance between herself and the voices. She wants to stop responding to them and hopes to implement other ways of coping (like music). Pt was receptive to reality testing exercise today and thanked writer for session.     Patient participated in psychotherapy session. Patient presented with adequate grooming and was casually dressed. Patient maintained appropriate eye contact and demonstrated a cooperative attitude. No abnormal motor movements noted. Patient's mood was euthymic with constricted affect. Speech was within normal limits. Pt denied hearing voices in session. Patient's thought process was linear and coherent. Patient's thought content was significant for curiosity regarding beliefs about neighbors. No suicidal ideation/intent/plan. No HI endorsed. Insight, judgement, and impulse control are good. 
Engaged in therapy session. Focused on validation of current experiences and ways hospitalization can support pt's treatment. Pt reported that she came to the hospital in the setting of increased voice hearing and mistrust of others. Pt expressed that her housing situation has brought on stress because she lives in her sister's home and would like to reside in a place of her own. She is waiting for a response from [employer] and it is possible that her outpatient therapist helped her with a supportive housing application. We discussed the connection between stress and symptoms. Pt acknowledged that she experiences increases in voice hearing when she feels anxious and reported that she heard voices say things like "everyone will be laughing at you" in group today. We engaged in brief reality testing exercise to which pt responded and stated that she tries to allow the voices to be there without getting too caught up in their content. Pt is willing to work on active coping skills for her voice hearing experiences. Provided skill building and validation.     Patient participated in psychotherapy session. Patient presented with adequate grooming and was casually dressed. Patient maintained appropriate eye contact and demonstrated a cooperative attitude. No abnormal motor movements noted. Patient's mood was euthymic with constricted affect. Speech was within normal limits. Pt endorsed hearing voices today (critical theme) and denied hearing them in session. Patient's thought process was linear and coherent. Patient's thought content was significant for some distrust of others. No suicidal ideation/intent/plan. No HI endorsed. Insight, judgement, and impulse control are fair-good. 
Engaged in therapy session. Focused on evaluating evidence for/against belief that the “voices are real.” Pt reported that the voices occupy “200%” of her energy and it takes her away from “cooking, cleaning, being around family and letting them [family] enjoy my cooking.” Pt acknowledged that she tries to engage in these important activities but finds the voices (of her neighbors) quite demoralizing and upsetting. Writer commended pt for following her values as an alternative to giving the voices all of her attention. We then discussed evidence for/against pt’s belief about the voices. She reflected that if she knew they weren’t real, she would feel relieved and better able to focus on meaningful activities. She stated that she hears the voices through the walls and understands this is “impossible” with regard to a human’s capacity to hear. Pt also added she may hear the voices when her neighbors are not home. Pt was receptive to this cognitive strategy and is willing to evaluate the evidence in our next session. Writer also gave pt SOS.    Patient participated in psychotherapy session. Patient presented with adequate grooming and was casually dressed. Patient maintained appropriate eye contact and demonstrated a cooperative attitude. No abnormal motor movements noted. Patient's mood was euthymic with constricted affect. Speech was within normal limits. Pt denied hearing voices in session. Patient's thought process was linear and coherent. Patient's thought content was significant for curiosity regarding beliefs about neighbors. No suicidal ideation/intent/plan. No HI endorsed. Insight, judgement, and impulse control are fair-good.

## 2024-05-01 NOTE — BH INPATIENT PSYCHIATRY DISCHARGE NOTE - NSDCMRMEDTOKEN_GEN_ALL_CORE_FT
ARIPiprazole 5 mg oral tablet: 5 tab(s) orally once a day (at bedtime)  atorvastatin 10 mg oral tablet: 1 tab(s) orally once a day (at bedtime)  clonazePAM 0.5 mg oral tablet: 1 tab(s) orally 2 times a day  metFORMIN 500 mg oral tablet: 1 tab(s) orally once a day  oxyBUTYnin 5 mg oral tablet: 1 tab(s) orally 2 times a day  ziprasidone 20 mg oral capsule: 5 cap(s) orally  ziprasidone 60 mg oral capsule: 1 cap(s) orally   ARIPiprazole 20 mg oral tablet: 1 tab(s) orally once a day (at bedtime)  ARIPiprazole 5 mg oral tablet: 1 tab(s) orally once a day (at bedtime)  clonazePAM 0.5 mg oral tablet: 1 tab(s) orally 2 times a day MDD: 1mg  metFORMIN 500 mg oral tablet: 1 tab(s) orally once a day  pravastatin: 40 milligram(s) once a day (at bedtime)  trospium 20 mg oral tablet: 1 tab(s) orally 2 times a day  ziprasidone 40 mg oral capsule: 1 cap(s) orally once a day (at bedtime)  ziprasidone 60 mg oral capsule: 1 cap(s) orally 2 times a day in the AM and PM

## 2024-05-01 NOTE — BH PSYCHOLOGY - CLINICIAN PSYCHOTHERAPY NOTE - NSBHPSYCHOLGOALS_PSY_A_CORE FT
Patient due to follow-up 12/2020-1/2021
Decrease symptoms, Develop coping/emotion regulation skills, Increase value-based action, Psychoeducation 

## 2024-05-01 NOTE — BH INPATIENT PSYCHIATRY DISCHARGE NOTE - DESCRIPTION
Born in [place], lives with her 2 daughters, grandson, sister and brother in law, unemployed, on disability

## 2024-05-02 LAB
GLUCOSE BLDC GLUCOMTR-MCNC: 120 MG/DL — HIGH (ref 70–99)
GLUCOSE BLDC GLUCOMTR-MCNC: 129 MG/DL — HIGH (ref 70–99)
GLUCOSE BLDC GLUCOMTR-MCNC: 151 MG/DL — HIGH (ref 70–99)
GLUCOSE BLDC GLUCOMTR-MCNC: 80 MG/DL — SIGNIFICANT CHANGE UP (ref 70–99)

## 2024-05-02 PROCEDURE — 99232 SBSQ HOSP IP/OBS MODERATE 35: CPT

## 2024-05-02 RX ORDER — ZIPRASIDONE HYDROCHLORIDE 20 MG/1
1 CAPSULE ORAL
Qty: 0 | Refills: 0 | DISCHARGE

## 2024-05-02 RX ORDER — METFORMIN HYDROCHLORIDE 850 MG/1
1 TABLET ORAL
Qty: 14 | Refills: 0
Start: 2024-05-02 | End: 2024-05-15

## 2024-05-02 RX ORDER — ARIPIPRAZOLE 15 MG/1
1 TABLET ORAL
Qty: 14 | Refills: 0
Start: 2024-05-02 | End: 2024-05-15

## 2024-05-02 RX ORDER — ZIPRASIDONE HYDROCHLORIDE 20 MG/1
1 CAPSULE ORAL
Qty: 14 | Refills: 0
Start: 2024-05-02 | End: 2024-05-15

## 2024-05-02 RX ORDER — TROSPIUM CHLORIDE 20 MG/1
1 TABLET, FILM COATED ORAL
Qty: 0 | Refills: 0 | DISCHARGE

## 2024-05-02 RX ORDER — ARIPIPRAZOLE 15 MG/1
1 TABLET ORAL
Qty: 0 | Refills: 0 | DISCHARGE

## 2024-05-02 RX ORDER — ZIPRASIDONE HYDROCHLORIDE 20 MG/1
1 CAPSULE ORAL
Qty: 28 | Refills: 0
Start: 2024-05-02 | End: 2024-05-15

## 2024-05-02 RX ORDER — CLONAZEPAM 1 MG
1 TABLET ORAL
Qty: 28 | Refills: 0
Start: 2024-05-02 | End: 2024-05-15

## 2024-05-02 RX ADMIN — ARIPIPRAZOLE 25 MILLIGRAM(S): 15 TABLET ORAL at 20:22

## 2024-05-02 RX ADMIN — ATORVASTATIN CALCIUM 10 MILLIGRAM(S): 80 TABLET, FILM COATED ORAL at 20:23

## 2024-05-02 RX ADMIN — METFORMIN HYDROCHLORIDE 500 MILLIGRAM(S): 850 TABLET ORAL at 08:31

## 2024-05-02 RX ADMIN — Medication 5 MILLIGRAM(S): at 20:22

## 2024-05-02 RX ADMIN — Medication 50 MILLIGRAM(S): at 20:23

## 2024-05-02 RX ADMIN — ZIPRASIDONE HYDROCHLORIDE 100 MILLIGRAM(S): 20 CAPSULE ORAL at 20:22

## 2024-05-02 RX ADMIN — ZIPRASIDONE HYDROCHLORIDE 60 MILLIGRAM(S): 20 CAPSULE ORAL at 08:31

## 2024-05-02 RX ADMIN — Medication 0.5 MILLIGRAM(S): at 08:31

## 2024-05-02 RX ADMIN — Medication 0.5 MILLIGRAM(S): at 20:23

## 2024-05-02 RX ADMIN — Medication 5 MILLIGRAM(S): at 08:31

## 2024-05-02 NOTE — BH INPATIENT PSYCHIATRY PROGRESS NOTE - NSBHASSESSSUMMFT_PSY_ALL_CORE
Pt is a 59yo F, domiciled in [place], with 2 daughters, grandson, sister, and brother-in-law, unemployed on disability, PPHx dx of schizophrenia, hx of 3 prior psychiatric hospitalizations (most recent in 2017 at [facility] [place]), currently in outpatient treatment with Dr. Ani Rainey ([phone]), no hx of SA/NSSI, no hx of violence/aggression, no substance misuse, no access to firearms, who presents to [facility] 4/17/23 referred by psychiatrist and accompanies by daughter for worsening auditory hallucinations.    Pt with paranoia but it is attenuating and without AH today, reports if she went home she does not know if the voices would come back but she now is feeling more safe to go home.  Pt agrees to do her hobbies to distract herself from her neighbors and any voices that may arise when she goes home    >Legal: 9.13 VOL  >Obs: Routine, no current SI. no need for CO, patient not expected to pose risk to self or others in controlled inpatient setting  >Psychiatric Meds: Titrate Abilify to 25mg PO at bedtime (psychosis) and continue Geodon 60mg PO daily and 100mg PO at bedtime (psychosis) and Klonopin 0.5mg PO BID (anxiety) for now  >Labs: Admission labs reviewed, no acute findings.   >Medical:  No acute concerns. No consultations needed at this time.  >Social: milieu/structured therapy  >Treatment Interventions: Groups and Individual Therapy/CBT  >Dispo: pt planned to discharge on 5/3 with outpatient care

## 2024-05-02 NOTE — BH PSYCHOLOGY - GROUP THERAPY NOTE - NSBHPSYCHOLPARTICIPCOMMENT_PSY_A_CORE FT
Pt was actively engaged, alert and oriented throughout group. During check-in, pt shared that she would like to continue to learn how to play the guitar. Pt participated in discussion centering around self-compassion, shared how she regularly practices self-compassion and positive affirmations, and listened to other participants throughout group.
Pt. engaged appropriately in group. Pt. was alert and oriented throughout group. During check-in, pt. shared that she prefers the "beach" due to many different types of people hanging out together. Pt. participated in the 5 senses exercise, engaging in a discussion of engaging with the 5 senses. Pt. listened as others shared their experiences. 
Pt engaged appropriately in group. At check-in, pt reported feeling good about the people she has met on the unit. Pt actively listened to peers share their reactions to the poem.
Pt was an active participant, alert and oriented throughout group. During check-in, pt shared that if she could have any superpower, she would like to spend time with her family. Pt engaged in acceptance activity, and shared personal examples. Pt listened as others shared their experiences. 
Pt. engaged appropriately in group. Pt. was alert and oriented throughout group. During check-in, pt. shared that a challenge she overcame was coping with psychosis. Pt. participated in the discussion of challenge and growth. Pt. offered her reflections and listened as others shared their experiences.
Pt. engaged appropriately in group. Pt. was alert and oriented throughout group. During check-in, pt. shared that she would like teleport to an "earlier time in my life when I didn't hear voices". Pt. participated in the processing of events that occurred on the unit over the weekend. Pt. listened as others shared their experiences and offered supportive feedback.
Pt engaged appropriately in group. She listened actively to peers and provided support. 
Pt was an active participant, alert and oriented throughout group. During check-in, pt shared that if she could have any gift, it would be a big house to fill with her family, friends and loved ones. Pt engaged in acceptance activity, and shared personal examples related to bus metaphor. Pt listened as others shared their experiences. 
Pt. engaged appropriately in group. Pt. was alert and oriented throughout group. During check-in, pt. shared that he preferred the "beach and the city". Pt. participated in the 5 senses exercise, engaging in a discussion of the engaging with the 5 senses. Pt. listened as others shared their experiences. 
Pt. engaged appropriately in group. Pt. was alert and oriented throughout group. She reported that she would support a friend in pain by listening to them. Pt participated in self-compassion activity, and listened to peers describe their reactions to the exercise. 
Pt was actively engaged, alert and oriented throughout group. During check-in, pt shared that she shows herself compassion through cooking. Pt contributed to group discussion on self-compassion and watched peers add self-compassionate statements to the chalkboard.

## 2024-05-02 NOTE — BH INPATIENT PSYCHIATRY PROGRESS NOTE - NSBHMSETHTCONTENT_PSY_A_CORE
Delusions
Delusions
Delusions/Other
Delusions
Delusions/Other

## 2024-05-02 NOTE — BH PSYCHOLOGY - GROUP THERAPY NOTE - NSPSYCHOLGRPCOGAFCT_PSY_A_CORE FT
constricted
congruent

## 2024-05-02 NOTE — BH INPATIENT PSYCHIATRY PROGRESS NOTE - NSBHCHARTREVIEWVS_PSY_A_CORE FT
Vital Signs Last 24 Hrs  T(C): 36.2 (04-20-24 @ 07:49), Max: 36.2 (04-20-24 @ 07:49)  T(F): 97.1 (04-20-24 @ 07:49), Max: 97.1 (04-20-24 @ 07:49)  HR: 100 (04-20-24 @ 07:49) (100 - 100)  BP: 124/75 (04-20-24 @ 07:49) (124/75 - 124/75)  BP(mean): --  RR: --  SpO2: --    Orthostatic VS  04-19-24 @ 08:18  Lying BP: --/-- HR: --  Sitting BP: 117/80 HR: 106  Standing BP: 108/74 HR: 108  Site: --  Mode: --  
Vital Signs Last 24 Hrs  T(C): 36.2 (04-26-24 @ 08:05), Max: 36.2 (04-26-24 @ 08:05)  T(F): 97.2 (04-26-24 @ 08:05), Max: 97.2 (04-26-24 @ 08:05)  HR: --  BP: --  BP(mean): --  RR: --  SpO2: --    Orthostatic VS  04-26-24 @ 08:05  Lying BP: --/-- HR: --  Sitting BP: 121/77 HR: 90  Standing BP: --/-- HR: --  Site: --  Mode: --  Orthostatic VS  04-25-24 @ 07:49  Lying BP: --/-- HR: --  Sitting BP: 129/70 HR: 79  Standing BP: --/-- HR: --  Site: --  Mode: --  
Vital Signs Last 24 Hrs  T(C): 36.9 (04-23-24 @ 07:56), Max: 36.9 (04-23-24 @ 07:56)  T(F): 98.5 (04-23-24 @ 07:56), Max: 98.5 (04-23-24 @ 07:56)  HR: 107 (04-23-24 @ 07:56) (107 - 107)  BP: 125/74 (04-23-24 @ 07:56) (125/74 - 125/74)  BP(mean): --  RR: --  SpO2: --    Orthostatic VS  04-22-24 @ 07:38  Lying BP: --/-- HR: --  Sitting BP: 132/81 HR: 92  Standing BP: 122/78 HR: 99  Site: --  Mode: --  
Vital Signs Last 24 Hrs  T(C): 36.9 (04-29-24 @ 06:51), Max: 36.9 (04-29-24 @ 06:51)  T(F): 98.4 (04-29-24 @ 06:51), Max: 98.4 (04-29-24 @ 06:51)  HR: 97 (04-29-24 @ 06:51) (97 - 97)  BP: 114/73 (04-29-24 @ 06:51) (114/73 - 114/73)  BP(mean): --  RR: --  SpO2: --    Orthostatic VS  04-28-24 @ 07:57  Lying BP: --/-- HR: --  Sitting BP: 107/65 HR: 88  Standing BP: --/-- HR: --  Site: --  Mode: --  
Vital Signs Last 24 Hrs  T(C): 36.4 (04-22-24 @ 07:38), Max: 36.4 (04-22-24 @ 07:38)  T(F): 97.6 (04-22-24 @ 07:38), Max: 97.6 (04-22-24 @ 07:38)  HR: --  BP: --  BP(mean): --  RR: --  SpO2: --    Orthostatic VS  04-22-24 @ 07:38  Lying BP: --/-- HR: --  Sitting BP: 132/81 HR: 92  Standing BP: 122/78 HR: 99  Site: --  Mode: --  Orthostatic VS  04-21-24 @ 07:45  Lying BP: --/-- HR: --  Sitting BP: 120/79 HR: 95  Standing BP: 110/75 HR: 106  Site: --  Mode: --  
Vital Signs Last 24 Hrs  T(C): 36.5 (05-01-24 @ 08:19), Max: 36.5 (05-01-24 @ 08:19)  T(F): 97.7 (05-01-24 @ 08:19), Max: 97.7 (05-01-24 @ 08:19)  HR: --  BP: --  BP(mean): --  RR: --  SpO2: --    Orthostatic VS  05-01-24 @ 08:19  Lying BP: --/-- HR: --  Sitting BP: 121/73 HR: 73  Standing BP: 123/80 HR: 98  Site: --  Mode: --  
Vital Signs Last 24 Hrs  T(C): 36.5 (05-02-24 @ 08:11), Max: 36.5 (05-02-24 @ 08:11)  T(F): 97.7 (05-02-24 @ 08:11), Max: 97.7 (05-02-24 @ 08:11)  HR: --  BP: --  BP(mean): --  RR: --  SpO2: --    Orthostatic VS  05-02-24 @ 08:11  Lying BP: --/-- HR: --  Sitting BP: 127/72 HR: 99  Standing BP: 118/81 HR: 112  Site: --  Mode: --  Orthostatic VS  05-01-24 @ 08:19  Lying BP: --/-- HR: --  Sitting BP: 121/73 HR: 73  Standing BP: 123/80 HR: 98  Site: --  Mode: --  
Vital Signs Last 24 Hrs  T(C): 36.5 (04-19-24 @ 08:18), Max: 36.5 (04-19-24 @ 08:18)  T(F): 97.7 (04-19-24 @ 08:18), Max: 97.7 (04-19-24 @ 08:18)  HR: --  BP: --  BP(mean): --  RR: --  SpO2: --    Orthostatic VS  04-19-24 @ 08:18  Lying BP: --/-- HR: --  Sitting BP: 117/80 HR: 106  Standing BP: 108/74 HR: 108  Site: --  Mode: --  Orthostatic VS  04-18-24 @ 07:46  Lying BP: --/-- HR: --  Sitting BP: 128/82 HR: 96  Standing BP: --/-- HR: --  Site: --  Mode: --  Orthostatic VS  04-17-24 @ 20:37  Lying BP: --/-- HR: --  Sitting BP: 125/87 HR: 107  Standing BP: 113/88 HR: 113  Site: --  Mode: --  Orthostatic VS  04-17-24 @ 17:54  Lying BP: --/-- HR: --  Sitting BP: 139/91 HR: 103  Standing BP: 134/95 HR: 99  Site: upper left arm  Mode: electronic  
Vital Signs Last 24 Hrs  T(C): 37.1 (04-24-24 @ 07:20), Max: 37.1 (04-24-24 @ 07:20)  T(F): 98.8 (04-24-24 @ 07:20), Max: 98.8 (04-24-24 @ 07:20)  HR: --  BP: --  BP(mean): --  RR: --  SpO2: --    Orthostatic VS  04-24-24 @ 07:20  Lying BP: --/-- HR: --  Sitting BP: 141/87 HR: 88  Standing BP: 146/89 HR: 98  Site: upper left arm  Mode: electronic  
Vital Signs Last 24 Hrs  T(C): 36.4 (04-30-24 @ 07:58), Max: 36.4 (04-30-24 @ 07:58)  T(F): 97.6 (04-30-24 @ 07:58), Max: 97.6 (04-30-24 @ 07:58)  HR: 91 (04-30-24 @ 07:58) (91 - 91)  BP: 131/73 (04-30-24 @ 07:58) (131/73 - 131/73)  BP(mean): --  RR: --  SpO2: --    
Vital Signs Last 24 Hrs  T(C): 37.1 (04-25-24 @ 07:49), Max: 37.1 (04-25-24 @ 07:49)  T(F): 98.7 (04-25-24 @ 07:49), Max: 98.7 (04-25-24 @ 07:49)  HR: --  BP: --  BP(mean): --  RR: --  SpO2: --    Orthostatic VS  04-25-24 @ 07:49  Lying BP: --/-- HR: --  Sitting BP: 129/70 HR: 79  Standing BP: --/-- HR: --  Site: --  Mode: --  Orthostatic VS  04-24-24 @ 07:20  Lying BP: --/-- HR: --  Sitting BP: 141/87 HR: 88  Standing BP: 146/89 HR: 98  Site: upper left arm  Mode: electronic  
Vital Signs Last 24 Hrs  T(C): 36.7 (04-21-24 @ 07:45), Max: 36.7 (04-21-24 @ 07:45)  T(F): 98 (04-21-24 @ 07:45), Max: 98 (04-21-24 @ 07:45)  HR: --  BP: --  BP(mean): --  RR: --  SpO2: --    Orthostatic VS  04-21-24 @ 07:45  Lying BP: --/-- HR: --  Sitting BP: 120/79 HR: 95  Standing BP: 110/75 HR: 106  Site: --  Mode: --

## 2024-05-02 NOTE — BH INPATIENT PSYCHIATRY PROGRESS NOTE - NSBHFUPINTERVALCCFT_PSY_A_CORE
Patient seen for follow up for psychosis.
Pt seen f/u for psychosis
Patient seen for follow up for psychosis.

## 2024-05-02 NOTE — BH PSYCHOLOGY - GROUP THERAPY NOTE - NSPSYCHOLGRPCOGPT_PSY_A_CORE
participated in exercise/shared negative coping experience/shared positive coping experience/other...
other...
participated in exercise/other...
participated in exercise/other...
participated in exercise/shared negative coping experience/shared positive coping experience/other...
other...
participated in exercise/shared negative coping experience/other...
participated in exercise/shared negative coping experience/shared positive coping experience/other...
participated in exercise/shared negative coping experience/shared positive coping experience
participated in exercise/shared negative coping experience/shared positive coping experience/other...
participated in exercise/shared negative coping experience/shared positive coping experience/other...

## 2024-05-02 NOTE — BH PSYCHOLOGY - GROUP THERAPY NOTE - NSPSYCHOLGRPCOGGOAL_PSY_A_CORE FT
Decrease symptoms, Develop coping/emotion regulation skills, Increase value-based action, Psychoeducation  

## 2024-05-02 NOTE — BH TREATMENT PLAN - NSTXPLANTHERAPYSESSIONSFT_PSY_ALL_CORE
04-25-24  Type of therapy: Cognitive behavior therapy, Dialectical behavior therapy, Coping skills, Creative arts therapy, Leisure development, Music therapy, Safety planning, Self esteem, Spirituality  Type of session: Individual  Level of patient participation: Attentive  Duration of participation: Less than 15 minutes  Therapy conducted by: Psych rehab  Therapy Summary: Writer met with patient to discuss progress towards their goal. Patient was calm and attentive throughout the session. Patient reports she is doing better from when she first was admitted to the unit. Patient reports her sleep and appetite is fine. Patient reports she can still hear voices; however, they are not as strong as when she is home. Patient reports she wants to be discharged soon but is nervous about the voices. Patient is visible on the unit and selectively social with peers. Patient attends Psychiatric Rehabilitation groups and participates. Patient denies SI/HI/VH. Writer will engage pt regularly to determine progress towards goals    04-30-24  Type of therapy: Medication management, Psychoeducation, Relapse prevention  --  --  --  --  --    05-01-24  Type of therapy: Medication management, Mindfulness, Relapse prevention, Safety planning  --  --  --  --  --

## 2024-05-02 NOTE — BH INPATIENT PSYCHIATRY PROGRESS NOTE - NSBHMSEAFFQUAL_PSY_A_CORE
Euthymic/Anxious
Euthymic
Euthymic/Anxious
Euthymic
Euthymic/Anxious
Euthymic
Euthymic/Anxious
Euthymic
Euthymic
Euthymic/Anxious

## 2024-05-02 NOTE — BH PSYCHOLOGY - GROUP THERAPY NOTE - TOKEN PULL-DIAGNOSIS
Primary Diagnosis:  Schizophrenia [F20.9]        Problem Dx:   

## 2024-05-02 NOTE — BH INPATIENT PSYCHIATRY PROGRESS NOTE - NSBHCONSULTIPREASON_PSY_A_CORE
other reason

## 2024-05-02 NOTE — BH PSYCHOLOGY - GROUP THERAPY NOTE - NSBHPSYCHOLPARTICIP_PSY_A_CORE
Fully engaged

## 2024-05-02 NOTE — BH PSYCHOLOGY - GROUP THERAPY NOTE - NSBHPTASSESSDT_PSY_A_CORE
30-Apr-2024 10:15
25-Apr-2024 10:15
02-May-2024 10:15
25-Apr-2024 10:15
01-May-2024 10:15
26-Apr-2024 10:15
23-Apr-2024 10:15
29-Apr-2024 10:15
18-Apr-2024 10:15
24-Apr-2024 10:15
19-Apr-2024 10:15

## 2024-05-02 NOTE — BH INPATIENT PSYCHIATRY PROGRESS NOTE - NSTXSLPPATPROGRES_PSY_ALL_CORE
Improving
Improving
Met - goal discontinued
Improving
Met - goal discontinued
Met - goal discontinued

## 2024-05-02 NOTE — BH INPATIENT PSYCHIATRY PROGRESS NOTE - NSBHMSEPERCEPT_PSY_A_CORE
No abnormalities/Other
Auditory hallucinations/Other
No abnormalities
Auditory hallucinations/Other
No abnormalities
Auditory hallucinations
Auditory hallucinations
Auditory hallucinations/Other
No abnormalities/Other
Auditory hallucinations/Other

## 2024-05-02 NOTE — BH INPATIENT PSYCHIATRY PROGRESS NOTE - NSTXPSYCHODATETRGT_PSY_ALL_CORE
25-Apr-2024
25-Apr-2024
02-May-2024
25-Apr-2024
25-Apr-2024
02-May-2024
25-Apr-2024
02-May-2024
25-Apr-2024
02-May-2024
01-May-2024
09-May-2024

## 2024-05-02 NOTE — BH PSYCHOLOGY - GROUP THERAPY NOTE - NSBHPSYCHOLASSESSPROV_PSY_A_CORE
Psychology Trainee only
Licensed Psychologist and Psychology Trainee
Licensed Psychologist
Licensed Psychologist and Psychology Trainee
Licensed Psychologist and Psychology Trainee
Licensed Psychologist
Licensed Psychologist and Psychology Trainee

## 2024-05-02 NOTE — BH INPATIENT PSYCHIATRY PROGRESS NOTE - NSTXSLPPATGOAL_PSY_ALL_CORE
Utilize relaxation techniques to aid in sleeping
Will be able to identify and utilize 2 sleep hygiene strategies daily
Be able to sleep for a minimum of six hours daily
Will be able to identify and utilize 2 sleep hygiene strategies daily
Be able to sleep for a minimum of six hours daily
Will be able to identify and utilize 2 sleep hygiene strategies daily
Be able to sleep for a minimum of six hours daily
Be able to sleep for a minimum of six hours daily

## 2024-05-02 NOTE — BH SAFETY PLAN - STEP 3 DISTRACTION NAME
9/18/2021    Patient: Vasyl Knutson   YOB: 1976   Date of Visit: 8/30/2021     Marcus Roy MD  [address] [place] [id number]  Via Fax: [phone]    Dear Marcus Roy MD,      Thank you for referring Mr. Vasyl Knutson to [address] [place] [address],[address] for evaluation. My notes for this consultation are attached. If you have questions, please do not hesitate to call me. I look forward to following your patient along with you.       Sincerely,    Shari Carrion MD
.

## 2024-05-02 NOTE — BH INPATIENT PSYCHIATRY PROGRESS NOTE - NSBHFUPINTERVALHXFT_PSY_A_CORE
Pt compliant with medication and tolerating it well.  Chart reviewed and case discussed with treatment team.  No events reported overnight.  Pt denies SI/HI/I/P or VH.  Pt eating and sleeping well.  Pt denies AH at time of interview, but reports she heard the voices last night, saying the same thing, that they want to hurt her.  Pt denies CAH.  Pt reports she feels the AH would be worse if she went home because her house is close to her neighbors.  Pt remains paranoid that the neighbors want to hurt her.  Pt reports she does not feel safe going home at this time.  Pt reports she also does not trust her sister's .
Pt compliant with medication and tolerating it well.  Chart reviewed and case discussed with treatment team.  No events reported overnight.  Pt reports she is not hearing AH, but does not know if the voices will come back when she goes home due to her neighbors being there, but she would like to give it a try.  Pt reports she feels her neighbors want to hurt her.  Pt reports she is now feeling more safe going home because her family is supportive and tell her that the voices are not real.  Pt reports her sister's  says he believes her when she says what the voices are saying and she does not like when he does that.  Pt reports she knows she is getting better.  Pt continues to refuse referral to PHP, reporting her outpatient clinic has groups she can attend.  Pt denies SI/HI/I/P or VH.  Pt eating and sleeping well.
Pt compliant with medication and tolerating it well.  Chart reviewed and case discussed with treatment team.  No events reported overnight.  Pt reports she is not hearing voices today, but if she went home, she would likely hear voices.  Pt remains paranoid that her neighbors will harm her.  Pt reports she does not feel safe at home.  Pt reports if she were alone in her room, she would be scared.  Pt denies SI/HI/I/P or VH.  Pt eating and sleeping well.
Chart reviewed and case discussed with treatment team. No events reported overnight. Sleep and appetite is fair. Patient stated that she only experiences +AH in her home, denies any on the unit.  Patient is compliant with medications, no adverse effects reported.  
Pt compliant with medication and tolerating it well.  Chart reviewed and case discussed with treatment team.  No events reported overnight.  Pt reports trouble sleeping last night, but ultimately slept.  Pt reports her AH are less but still there saying the same things.  Pt denies CAH or SI/HI/I/P.  Pt reports she wants somewhere else to live because she does not trust others.  Pt is still paranoid about her neighbors.  Pt denies VH and is eating well.  Pt reports poor concentration in group due to the voices.
Chart reviewed and case discussed with treatment team. No events reported overnight. Sleep and appetite is fair. Patient stated that she only experiences +AH in her home, denies any currently.  Patient is compliant with medications, no adverse effects reported.  
Pt compliant with medication and tolerating it well.  Chart reviewed and case discussed with treatment team.  No events reported overnight.  Pt reports she is not hearing AH, but does not know if the voices will come back when she goes home due to her neighbors being there, but she is still willing to give it a try.  Pt denies SI/HI/I/P or VH.  Pt remains paranoid about her neighbors, but less so than admission and she feels safe in her home around her family.  Pt eating and sleeping well.  Pt agrees to do her hobbies such as cooking, cleaning, reading, jogging and walking to keep herself busy and distract herself from any negative thoughts or voices that may arise when she goes home.  Pt advised by YVETTE that her daughter is working on getting her a place of her own and patient is happy with this.
Pt compliant with medication and tolerating it well.  Chart reviewed and case discussed with treatment team.  No events reported overnight.  Pt denies AH in the hospital, but reports if she was home around her neighbors she would hear them.  Pt reports she does not feel safe at home due to her neighbors and brother in law who takes things out on her.  Pt denies SI/HI/I/P or VH.  Pt eating and sleeping well.
Pt compliant with medication and tolerating it well.  Chart reviewed and case discussed with treatment team.  No events reported overnight.  Pt denies SI/HI/I/P or AH/VH or paranoia.  Pt eating and sleeping well.  Pt reports she only hears voices in her home that say "rude stuff" due to her neighbors.  Pt reports they say they don't like her and "push me off the edge."  She reports they also tell her that they are doing voo doo and her medications are not working.  Pt denies CAH.  Pt reports she feels safe in the hospital.  Pt later reported to nursing that she was hearing voices and requesting medication for it.
Pt compliant with medication and tolerating it well.  Chart reviewed and case discussed with treatment team.  No events reported overnight.  Pt reports she is not hearing AH, but does not know if the voices will come back when she goes home due to her neighbors being there, but she is still willing to give it a try.  Pt denies SI/HI/I/P or VH.  Pt remains paranoid about her neighbors, but less so than admission and she feels safe in her home around her family.  Pt eating and sleeping well.  Pt reports if she has worsening AH or paranoia at home, she will turn on the TV or radio or read to cope.
Pt compliant with medication and tolerating it well.  Chart reviewed and case discussed with treatment team.  No events reported overnight.  Pt today admits to hearing voices in the hospital.  Pt reports they say she is not going home and they will kill her, they are doing Caodaism on her and that her medications are not helping.  Pt denies CAH.  Pt denies SI/HI/I/P or VH.  Pt eating and sleeping well.  Pt continues to feel scared to go home due to her neighbors and reports her bedroom is next to them.
Pt compliant with medication and tolerating it well.  Chart reviewed and case discussed with treatment team.  No events reported overnight.  Pt reports she is not hearing AH, but does not know if the voices will come back when she goes home due to her neighbors being there.  Pt reports she feels her neighbors want to hurt her and that is what the voices say when they are there.  Pt reports she feels they are also talking about her when she is in her house and want her to more.  Pt refusing referral to [facility], reporting the groups are too loud and sometimes she is afraid to leave her home.  Pt denies SI/HI/I/P or VH.  Pt eating and sleeping well.

## 2024-05-02 NOTE — BH INPATIENT PSYCHIATRY PROGRESS NOTE - NSDCCRITERIA_PSY_ALL_CORE
remission of sx

## 2024-05-02 NOTE — BH INPATIENT PSYCHIATRY PROGRESS NOTE - NSTXDCOPLKDATETRGT_PSY_ALL_CORE
02-May-2024
18-Apr-2024
02-May-2024
09-May-2024
18-Apr-2024
02-May-2024
02-May-2024
18-Apr-2024

## 2024-05-02 NOTE — BH PSYCHOLOGY - GROUP THERAPY NOTE - NSPSYCHOLGRPBILLING_PSY_A_CORE
[id number] - Group Psychotherapy
[id number] - Group Psychotherapy
[id number] - Group Psychotherapy
[id number] - Group Psychotherapy
[id number] - Group Psychotherapy
[id number] - Group Psychotherapy
[id number] - Group Psychotherapy
[id number] - Group Psychotherapy
[id number] - Group Psychotherapy

## 2024-05-02 NOTE — BH TREATMENT PLAN - NSTXDCOPLKINTERSW_PSY_ALL_CORE
SW will continue to encourage pt to take medications as perscribed to better the chances of psychiatric stability in the community.

## 2024-05-02 NOTE — BH TREATMENT PLAN - NSTXPSYCHOINTERPR_PSY_ALL_CORE
Psych Rehab will continue to engage pt in 1:1 sessions and groups as well as provide psychoeducation and support to facilitate progress towards goal.

## 2024-05-02 NOTE — BH INPATIENT PSYCHIATRY PROGRESS NOTE - NSTXPSYCHODATEEST_PSY_ALL_CORE
24-Apr-2024
09-May-2024
18-Apr-2024

## 2024-05-02 NOTE — BH INPATIENT PSYCHIATRY PROGRESS NOTE - NSTXDCOPLKDATEEST_PSY_ALL_CORE
25-Apr-2024
18-Apr-2024
25-Apr-2024
18-Apr-2024
02-May-2024
18-Apr-2024
25-Apr-2024
25-Apr-2024
18-Apr-2024
18-Apr-2024

## 2024-05-02 NOTE — BH PSYCHOLOGY - GROUP THERAPY NOTE - NSBHPSYCHOLGRPDUR_PSY_A_CORE
45 minutes
60 minutes
45 minutes

## 2024-05-02 NOTE — BH INPATIENT PSYCHIATRY PROGRESS NOTE - NSBHMETABOLIC_PSY_ALL_CORE_FT
BMI: BMI (kg/m2): 33 (04-17-24 @ 17:54)  HbA1c: A1C with Estimated Average Glucose Result: 6.1 % (04-18-24 @ 08:00)    Glucose: POCT Blood Glucose.: 80 mg/dL (05-02-24 @ 12:08)    BP: 131/73 (04-30-24 @ 07:58) (131/73 - 131/73)Vital Signs Last 24 Hrs  T(C): 36.5 (05-02-24 @ 08:11), Max: 36.5 (05-02-24 @ 08:11)  T(F): 97.7 (05-02-24 @ 08:11), Max: 97.7 (05-02-24 @ 08:11)  HR: --  BP: --  BP(mean): --  RR: --  SpO2: --    Orthostatic VS  05-02-24 @ 08:11  Lying BP: --/-- HR: --  Sitting BP: 127/72 HR: 99  Standing BP: 118/81 HR: 112  Site: --  Mode: --  Orthostatic VS  05-01-24 @ 08:19  Lying BP: --/-- HR: --  Sitting BP: 121/73 HR: 73  Standing BP: 123/80 HR: 98  Site: --  Mode: --    Lipid Panel: Date/Time: 04-18-24 @ 08:00  Cholesterol, Serum: 152  LDL Cholesterol Calculated: 72  HDL Cholesterol, Serum: 58  Total Cholesterol/HDL Ration Measurement: --  Triglycerides, Serum: 108  
BMI: BMI (kg/m2): 33 (04-17-24 @ 17:54)  HbA1c: A1C with Estimated Average Glucose Result: 6.1 % (04-18-24 @ 08:00)    Glucose: POCT Blood Glucose.: 116 mg/dL (05-01-24 @ 12:14)    BP: 131/73 (04-30-24 @ 07:58) (114/73 - 131/73)Vital Signs Last 24 Hrs  T(C): 36.5 (05-01-24 @ 08:19), Max: 36.5 (05-01-24 @ 08:19)  T(F): 97.7 (05-01-24 @ 08:19), Max: 97.7 (05-01-24 @ 08:19)  HR: --  BP: --  BP(mean): --  RR: --  SpO2: --    Orthostatic VS  05-01-24 @ 08:19  Lying BP: --/-- HR: --  Sitting BP: 121/73 HR: 73  Standing BP: 123/80 HR: 98  Site: --  Mode: --    Lipid Panel: Date/Time: 04-18-24 @ 08:00  Cholesterol, Serum: 152  LDL Cholesterol Calculated: 72  HDL Cholesterol, Serum: 58  Total Cholesterol/HDL Ration Measurement: --  Triglycerides, Serum: 108  
BMI: BMI (kg/m2): 33 (04-17-24 @ 17:54)  HbA1c: A1C with Estimated Average Glucose Result: 6.1 % (04-18-24 @ 08:00)    Glucose: POCT Blood Glucose.: 116 mg/dL (04-30-24 @ 11:49)    BP: 131/73 (04-30-24 @ 07:58) (114/73 - 131/73)Vital Signs Last 24 Hrs  T(C): 36.4 (04-30-24 @ 07:58), Max: 36.4 (04-30-24 @ 07:58)  T(F): 97.6 (04-30-24 @ 07:58), Max: 97.6 (04-30-24 @ 07:58)  HR: 91 (04-30-24 @ 07:58) (91 - 91)  BP: 131/73 (04-30-24 @ 07:58) (131/73 - 131/73)  BP(mean): --  RR: --  SpO2: --      Lipid Panel: Date/Time: 04-18-24 @ 08:00  Cholesterol, Serum: 152  LDL Cholesterol Calculated: 72  HDL Cholesterol, Serum: 58  Total Cholesterol/HDL Ration Measurement: --  Triglycerides, Serum: 108  
BMI: BMI (kg/m2): 33 (04-17-24 @ 17:54)  HbA1c: A1C with Estimated Average Glucose Result: 6.1 % (04-18-24 @ 08:00)    Glucose: POCT Blood Glucose.: 112 mg/dL (04-20-24 @ 20:39)    BP: 124/75 (04-20-24 @ 07:49) (124/75 - 124/75)Vital Signs Last 24 Hrs  T(C): 36.2 (04-20-24 @ 07:49), Max: 36.2 (04-20-24 @ 07:49)  T(F): 97.1 (04-20-24 @ 07:49), Max: 97.1 (04-20-24 @ 07:49)  HR: 100 (04-20-24 @ 07:49) (100 - 100)  BP: 124/75 (04-20-24 @ 07:49) (124/75 - 124/75)  BP(mean): --  RR: --  SpO2: --    Orthostatic VS  04-19-24 @ 08:18  Lying BP: --/-- HR: --  Sitting BP: 117/80 HR: 106  Standing BP: 108/74 HR: 108  Site: --  Mode: --    Lipid Panel: Date/Time: 04-18-24 @ 08:00  Cholesterol, Serum: 152  LDL Cholesterol Calculated: 72  HDL Cholesterol, Serum: 58  Total Cholesterol/HDL Ration Measurement: --  Triglycerides, Serum: 108  
BMI: BMI (kg/m2): 33 (04-17-24 @ 17:54)  HbA1c: A1C with Estimated Average Glucose Result: 6.1 % (04-18-24 @ 08:00)    Glucose: POCT Blood Glucose.: 80 mg/dL (04-19-24 @ 11:39)    BP: --Vital Signs Last 24 Hrs  T(C): 36.5 (04-19-24 @ 08:18), Max: 36.5 (04-19-24 @ 08:18)  T(F): 97.7 (04-19-24 @ 08:18), Max: 97.7 (04-19-24 @ 08:18)  HR: --  BP: --  BP(mean): --  RR: --  SpO2: --    Orthostatic VS  04-19-24 @ 08:18  Lying BP: --/-- HR: --  Sitting BP: 117/80 HR: 106  Standing BP: 108/74 HR: 108  Site: --  Mode: --  Orthostatic VS  04-18-24 @ 07:46  Lying BP: --/-- HR: --  Sitting BP: 128/82 HR: 96  Standing BP: --/-- HR: --  Site: --  Mode: --  Orthostatic VS  04-17-24 @ 20:37  Lying BP: --/-- HR: --  Sitting BP: 125/87 HR: 107  Standing BP: 113/88 HR: 113  Site: --  Mode: --  Orthostatic VS  04-17-24 @ 17:54  Lying BP: --/-- HR: --  Sitting BP: 139/91 HR: 103  Standing BP: 134/95 HR: 99  Site: upper left arm  Mode: electronic    Lipid Panel: Date/Time: 04-18-24 @ 08:00  Cholesterol, Serum: 152  LDL Cholesterol Calculated: 72  HDL Cholesterol, Serum: 58  Total Cholesterol/HDL Ration Measurement: --  Triglycerides, Serum: 108  
BMI: BMI (kg/m2): 33 (04-17-24 @ 17:54)  HbA1c: A1C with Estimated Average Glucose Result: 6.1 % (04-18-24 @ 08:00)    Glucose: POCT Blood Glucose.: 73 mg/dL (04-21-24 @ 16:47)    BP: 124/75 (04-20-24 @ 07:49) (124/75 - 124/75)Vital Signs Last 24 Hrs  T(C): 36.7 (04-21-24 @ 07:45), Max: 36.7 (04-21-24 @ 07:45)  T(F): 98 (04-21-24 @ 07:45), Max: 98 (04-21-24 @ 07:45)  HR: --  BP: --  BP(mean): --  RR: --  SpO2: --    Orthostatic VS  04-21-24 @ 07:45  Lying BP: --/-- HR: --  Sitting BP: 120/79 HR: 95  Standing BP: 110/75 HR: 106  Site: --  Mode: --    Lipid Panel: Date/Time: 04-18-24 @ 08:00  Cholesterol, Serum: 152  LDL Cholesterol Calculated: 72  HDL Cholesterol, Serum: 58  Total Cholesterol/HDL Ration Measurement: --  Triglycerides, Serum: 108  
BMI: BMI (kg/m2): 33 (04-17-24 @ 17:54)  HbA1c: A1C with Estimated Average Glucose Result: 6.1 % (04-18-24 @ 08:00)    Glucose: POCT Blood Glucose.: 122 mg/dL (04-24-24 @ 12:15)    BP: 125/74 (04-23-24 @ 07:56) (125/74 - 125/74)Vital Signs Last 24 Hrs  T(C): 37.1 (04-24-24 @ 07:20), Max: 37.1 (04-24-24 @ 07:20)  T(F): 98.8 (04-24-24 @ 07:20), Max: 98.8 (04-24-24 @ 07:20)  HR: --  BP: --  BP(mean): --  RR: --  SpO2: --    Orthostatic VS  04-24-24 @ 07:20  Lying BP: --/-- HR: --  Sitting BP: 141/87 HR: 88  Standing BP: 146/89 HR: 98  Site: upper left arm  Mode: electronic    Lipid Panel: Date/Time: 04-18-24 @ 08:00  Cholesterol, Serum: 152  LDL Cholesterol Calculated: 72  HDL Cholesterol, Serum: 58  Total Cholesterol/HDL Ration Measurement: --  Triglycerides, Serum: 108  
BMI: BMI (kg/m2): 33 (04-17-24 @ 17:54)  HbA1c: A1C with Estimated Average Glucose Result: 6.1 % (04-18-24 @ 08:00)    Glucose: POCT Blood Glucose.: 123 mg/dL (04-23-24 @ 07:46)    BP: 125/74 (04-23-24 @ 07:56) (125/74 - 125/74)Vital Signs Last 24 Hrs  T(C): 36.9 (04-23-24 @ 07:56), Max: 36.9 (04-23-24 @ 07:56)  T(F): 98.5 (04-23-24 @ 07:56), Max: 98.5 (04-23-24 @ 07:56)  HR: 107 (04-23-24 @ 07:56) (107 - 107)  BP: 125/74 (04-23-24 @ 07:56) (125/74 - 125/74)  BP(mean): --  RR: --  SpO2: --    Orthostatic VS  04-22-24 @ 07:38  Lying BP: --/-- HR: --  Sitting BP: 132/81 HR: 92  Standing BP: 122/78 HR: 99  Site: --  Mode: --    Lipid Panel: Date/Time: 04-18-24 @ 08:00  Cholesterol, Serum: 152  LDL Cholesterol Calculated: 72  HDL Cholesterol, Serum: 58  Total Cholesterol/HDL Ration Measurement: --  Triglycerides, Serum: 108  
BMI: BMI (kg/m2): 33 (04-17-24 @ 17:54)  HbA1c: A1C with Estimated Average Glucose Result: 6.1 % (04-18-24 @ 08:00)    Glucose: POCT Blood Glucose.: 118 mg/dL (04-25-24 @ 11:32)    BP: 125/74 (04-23-24 @ 07:56) (125/74 - 125/74)Vital Signs Last 24 Hrs  T(C): 37.1 (04-25-24 @ 07:49), Max: 37.1 (04-25-24 @ 07:49)  T(F): 98.7 (04-25-24 @ 07:49), Max: 98.7 (04-25-24 @ 07:49)  HR: --  BP: --  BP(mean): --  RR: --  SpO2: --    Orthostatic VS  04-25-24 @ 07:49  Lying BP: --/-- HR: --  Sitting BP: 129/70 HR: 79  Standing BP: --/-- HR: --  Site: --  Mode: --  Orthostatic VS  04-24-24 @ 07:20  Lying BP: --/-- HR: --  Sitting BP: 141/87 HR: 88  Standing BP: 146/89 HR: 98  Site: upper left arm  Mode: electronic    Lipid Panel: Date/Time: 04-18-24 @ 08:00  Cholesterol, Serum: 152  LDL Cholesterol Calculated: 72  HDL Cholesterol, Serum: 58  Total Cholesterol/HDL Ration Measurement: --  Triglycerides, Serum: 108  
BMI: BMI (kg/m2): 33 (04-17-24 @ 17:54)  HbA1c: A1C with Estimated Average Glucose Result: 6.1 % (04-18-24 @ 08:00)    Glucose: POCT Blood Glucose.: 115 mg/dL (04-29-24 @ 11:48)    BP: 114/73 (04-29-24 @ 06:51) (114/73 - 135/92)Vital Signs Last 24 Hrs  T(C): 36.9 (04-29-24 @ 06:51), Max: 36.9 (04-29-24 @ 06:51)  T(F): 98.4 (04-29-24 @ 06:51), Max: 98.4 (04-29-24 @ 06:51)  HR: 97 (04-29-24 @ 06:51) (97 - 97)  BP: 114/73 (04-29-24 @ 06:51) (114/73 - 114/73)  BP(mean): --  RR: --  SpO2: --    Orthostatic VS  04-28-24 @ 07:57  Lying BP: --/-- HR: --  Sitting BP: 107/65 HR: 88  Standing BP: --/-- HR: --  Site: --  Mode: --    Lipid Panel: Date/Time: 04-18-24 @ 08:00  Cholesterol, Serum: 152  LDL Cholesterol Calculated: 72  HDL Cholesterol, Serum: 58  Total Cholesterol/HDL Ration Measurement: --  Triglycerides, Serum: 108  
BMI: BMI (kg/m2): 33 (04-17-24 @ 17:54)  HbA1c: A1C with Estimated Average Glucose Result: 6.1 % (04-18-24 @ 08:00)    Glucose: POCT Blood Glucose.: 74 mg/dL (04-26-24 @ 11:13)    BP: --Vital Signs Last 24 Hrs  T(C): 36.2 (04-26-24 @ 08:05), Max: 36.2 (04-26-24 @ 08:05)  T(F): 97.2 (04-26-24 @ 08:05), Max: 97.2 (04-26-24 @ 08:05)  HR: --  BP: --  BP(mean): --  RR: --  SpO2: --    Orthostatic VS  04-26-24 @ 08:05  Lying BP: --/-- HR: --  Sitting BP: 121/77 HR: 90  Standing BP: --/-- HR: --  Site: --  Mode: --  Orthostatic VS  04-25-24 @ 07:49  Lying BP: --/-- HR: --  Sitting BP: 129/70 HR: 79  Standing BP: --/-- HR: --  Site: --  Mode: --    Lipid Panel: Date/Time: 04-18-24 @ 08:00  Cholesterol, Serum: 152  LDL Cholesterol Calculated: 72  HDL Cholesterol, Serum: 58  Total Cholesterol/HDL Ration Measurement: --  Triglycerides, Serum: 108  
BMI: BMI (kg/m2): 33 (04-17-24 @ 17:54)  HbA1c: A1C with Estimated Average Glucose Result: 6.1 % (04-18-24 @ 08:00)    Glucose: POCT Blood Glucose.: 113 mg/dL (04-22-24 @ 08:10)    BP: 124/75 (04-20-24 @ 07:49) (124/75 - 124/75)Vital Signs Last 24 Hrs  T(C): 36.4 (04-22-24 @ 07:38), Max: 36.4 (04-22-24 @ 07:38)  T(F): 97.6 (04-22-24 @ 07:38), Max: 97.6 (04-22-24 @ 07:38)  HR: --  BP: --  BP(mean): --  RR: --  SpO2: --    Orthostatic VS  04-22-24 @ 07:38  Lying BP: --/-- HR: --  Sitting BP: 132/81 HR: 92  Standing BP: 122/78 HR: 99  Site: --  Mode: --  Orthostatic VS  04-21-24 @ 07:45  Lying BP: --/-- HR: --  Sitting BP: 120/79 HR: 95  Standing BP: 110/75 HR: 106  Site: --  Mode: --    Lipid Panel: Date/Time: 04-18-24 @ 08:00  Cholesterol, Serum: 152  LDL Cholesterol Calculated: 72  HDL Cholesterol, Serum: 58  Total Cholesterol/HDL Ration Measurement: --  Triglycerides, Serum: 108

## 2024-05-02 NOTE — BH INPATIENT PSYCHIATRY PROGRESS NOTE - NSBHMSESPEECH_PSY_A_CORE
Normal volume, rate, productivity, spontaneity and articulation

## 2024-05-02 NOTE — BH INPATIENT PSYCHIATRY PROGRESS NOTE - NSBHMSETHTPROC_PSY_A_CORE
Linear
Linear/Impaired reasoning
Linear
Linear
Linear/Other
Linear/Impaired reasoning
Linear/Impaired reasoning
Linear/Other
Linear/Impaired reasoning

## 2024-05-02 NOTE — BH PSYCHOLOGY - GROUP THERAPY NOTE - NSPSYCHOLGRPCOGPROB_PSY_A_CORE FT
Anxiety, Depression, Emotion dysregulation, Lack of coping skills 

## 2024-05-02 NOTE — BH INPATIENT PSYCHIATRY PROGRESS NOTE - NSTXDCOPLKPROGRES_PSY_ALL_CORE
Met - goal discontinued
No Change
Improving
No Change
Improving
No Change
No Change

## 2024-05-02 NOTE — BH INPATIENT PSYCHIATRY PROGRESS NOTE - NSBHATTESTBILLING_PSY_A_CORE
91754-Annzpelpcg OBS or IP - moderate complexity OR 35-49 mins
[id number]-Zqlabwscyg OBS or IP - moderate complexity OR 35-49 mins
[id number]-Gezwudrwhe OBS or IP - moderate complexity OR 35-49 mins
[id number]-Phrtwuqxiy OBS or IP - moderate complexity OR 35-49 mins
[id number]-Muthcabgxw OBS or IP - low complexity OR 25-34 mins
[id number]-Kslkxvaliv OBS or IP - moderate complexity OR 35-49 mins
[id number]-Qbijtbwqic OBS or IP - moderate complexity OR 35-49 mins
[id number]-Lpilhehljh OBS or IP - moderate complexity OR 35-49 mins
[id number]-Hyujftdylm OBS or IP - moderate complexity OR 35-49 mins
[id number]-Midddzcojx OBS or IP - low complexity OR 25-34 mins
[id number]-Cgglslotoe OBS or IP - moderate complexity OR 35-49 mins
[id number]-Ehimqobimf OBS or IP - moderate complexity OR 35-49 mins

## 2024-05-02 NOTE — BH PSYCHOLOGY - GROUP THERAPY NOTE - NSBHPSYCHOLGRPNAME_PSY_A_CORE
CBT (Cognitive Behavioral Therapy) Group

## 2024-05-02 NOTE — BH INPATIENT PSYCHIATRY PROGRESS NOTE - PRN MEDS
MEDICATIONS  (PRN):  diphenhydrAMINE 50 milliGRAM(s) Oral every 6 hours PRN agiation  diphenhydrAMINE Injectable 50 milliGRAM(s) IntraMuscular once PRN agiation  haloperidol     Tablet 5 milliGRAM(s) Oral every 6 hours PRN agitation  haloperidol    Injectable 5 milliGRAM(s) IntraMuscular once PRN acute agiation  LORazepam     Tablet 2 milliGRAM(s) Oral every 6 hours PRN agitation  LORazepam   Injectable 2 milliGRAM(s) IntraMuscular once PRN agitation  
MEDICATIONS  (PRN):  diphenhydrAMINE 50 milliGRAM(s) Oral every 6 hours PRN agiation  diphenhydrAMINE Injectable 50 milliGRAM(s) IntraMuscular once PRN agiation  haloperidol     Tablet 5 milliGRAM(s) Oral every 6 hours PRN agitation  haloperidol    Injectable 5 milliGRAM(s) IntraMuscular once PRN acute agiation  LORazepam     Tablet 2 milliGRAM(s) Oral every 6 hours PRN agitation  LORazepam   Injectable 2 milliGRAM(s) IntraMuscular once PRN agitation  
MEDICATIONS  (PRN):  diphenhydrAMINE 50 milliGRAM(s) Oral every 6 hours PRN agiation  diphenhydrAMINE Injectable 50 milliGRAM(s) IntraMuscular once PRN agiation  haloperidol     Tablet 5 milliGRAM(s) Oral every 6 hours PRN agitation  haloperidol    Injectable 5 milliGRAM(s) IntraMuscular once PRN acute agiation  LORazepam     Tablet 1 milliGRAM(s) Oral every 6 hours PRN agitation  
MEDICATIONS  (PRN):  diphenhydrAMINE 50 milliGRAM(s) Oral every 6 hours PRN agiation  diphenhydrAMINE Injectable 50 milliGRAM(s) IntraMuscular once PRN agiation  haloperidol     Tablet 5 milliGRAM(s) Oral every 6 hours PRN agitation  haloperidol    Injectable 5 milliGRAM(s) IntraMuscular once PRN acute agiation  LORazepam     Tablet 1 milliGRAM(s) Oral every 6 hours PRN agitation  LORazepam   Injectable 2 milliGRAM(s) IntraMuscular once PRN agitation  
MEDICATIONS  (PRN):  diphenhydrAMINE 50 milliGRAM(s) Oral every 6 hours PRN agiation  diphenhydrAMINE Injectable 50 milliGRAM(s) IntraMuscular once PRN agiation  haloperidol     Tablet 5 milliGRAM(s) Oral every 6 hours PRN agitation  haloperidol    Injectable 5 milliGRAM(s) IntraMuscular once PRN acute agiation  
MEDICATIONS  (PRN):  diphenhydrAMINE 50 milliGRAM(s) Oral every 6 hours PRN agiation  diphenhydrAMINE Injectable 50 milliGRAM(s) IntraMuscular once PRN agiation  haloperidol     Tablet 5 milliGRAM(s) Oral every 6 hours PRN agitation  haloperidol    Injectable 5 milliGRAM(s) IntraMuscular once PRN acute agiation  LORazepam     Tablet 1 milliGRAM(s) Oral every 6 hours PRN agitation  
MEDICATIONS  (PRN):  diphenhydrAMINE 50 milliGRAM(s) Oral every 6 hours PRN agiation  diphenhydrAMINE Injectable 50 milliGRAM(s) IntraMuscular once PRN agiation  haloperidol     Tablet 5 milliGRAM(s) Oral every 6 hours PRN agitation  haloperidol    Injectable 5 milliGRAM(s) IntraMuscular once PRN acute agiation  LORazepam     Tablet 2 milliGRAM(s) Oral every 6 hours PRN agitation  LORazepam   Injectable 2 milliGRAM(s) IntraMuscular once PRN agitation  
MEDICATIONS  (PRN):  diphenhydrAMINE 50 milliGRAM(s) Oral every 6 hours PRN agiation  diphenhydrAMINE Injectable 50 milliGRAM(s) IntraMuscular once PRN agiation  haloperidol     Tablet 5 milliGRAM(s) Oral every 6 hours PRN agitation  haloperidol    Injectable 5 milliGRAM(s) IntraMuscular once PRN acute agiation  LORazepam     Tablet 1 milliGRAM(s) Oral every 6 hours PRN agitation  
MEDICATIONS  (PRN):  diphenhydrAMINE 50 milliGRAM(s) Oral every 6 hours PRN agiation  diphenhydrAMINE Injectable 50 milliGRAM(s) IntraMuscular once PRN agiation  haloperidol     Tablet 5 milliGRAM(s) Oral every 6 hours PRN agitation  haloperidol    Injectable 5 milliGRAM(s) IntraMuscular once PRN acute agiation  LORazepam     Tablet 2 milliGRAM(s) Oral every 6 hours PRN agitation  LORazepam   Injectable 2 milliGRAM(s) IntraMuscular once PRN agitation  
MEDICATIONS  (PRN):  diphenhydrAMINE 50 milliGRAM(s) Oral every 6 hours PRN agiation  diphenhydrAMINE Injectable 50 milliGRAM(s) IntraMuscular once PRN agiation  haloperidol     Tablet 5 milliGRAM(s) Oral every 6 hours PRN agitation  haloperidol    Injectable 5 milliGRAM(s) IntraMuscular once PRN acute agiation  LORazepam     Tablet 1 milliGRAM(s) Oral every 6 hours PRN agitation  
MEDICATIONS  (PRN):  diphenhydrAMINE 50 milliGRAM(s) Oral every 6 hours PRN agiation  diphenhydrAMINE Injectable 50 milliGRAM(s) IntraMuscular once PRN agiation  haloperidol     Tablet 5 milliGRAM(s) Oral every 6 hours PRN agitation  haloperidol    Injectable 5 milliGRAM(s) IntraMuscular once PRN acute agiation  LORazepam     Tablet 2 milliGRAM(s) Oral every 6 hours PRN agitation  LORazepam   Injectable 2 milliGRAM(s) IntraMuscular once PRN agitation  
MEDICATIONS  (PRN):  diphenhydrAMINE 50 milliGRAM(s) Oral every 6 hours PRN agiation  diphenhydrAMINE Injectable 50 milliGRAM(s) IntraMuscular once PRN agiation  haloperidol     Tablet 5 milliGRAM(s) Oral every 6 hours PRN agitation  haloperidol    Injectable 5 milliGRAM(s) IntraMuscular once PRN acute agiation  LORazepam     Tablet 1 milliGRAM(s) Oral every 6 hours PRN agitation

## 2024-05-02 NOTE — BH INPATIENT PSYCHIATRY PROGRESS NOTE - CURRENT MEDICATION
MEDICATIONS  (STANDING):  ARIPiprazole 10 milliGRAM(s) Oral at bedtime  atorvastatin 10 milliGRAM(s) Oral at bedtime  clonazePAM  Tablet 0.5 milliGRAM(s) Oral two times a day  hydrOXYzine hydrochloride 50 milliGRAM(s) Oral at bedtime  insulin lispro (ADMELOG) corrective regimen sliding scale   SubCutaneous three times a day before meals  metFORMIN 500 milliGRAM(s) Oral daily  oxybutynin 5 milliGRAM(s) Oral two times a day  ziprasidone 100 milliGRAM(s) Oral <User Schedule>  ziprasidone 60 milliGRAM(s) Oral <User Schedule>    MEDICATIONS  (PRN):  diphenhydrAMINE 50 milliGRAM(s) Oral every 6 hours PRN agiation  diphenhydrAMINE Injectable 50 milliGRAM(s) IntraMuscular once PRN agiation  haloperidol     Tablet 5 milliGRAM(s) Oral every 6 hours PRN agitation  haloperidol    Injectable 5 milliGRAM(s) IntraMuscular once PRN acute agiation  LORazepam     Tablet 2 milliGRAM(s) Oral every 6 hours PRN agitation  LORazepam   Injectable 2 milliGRAM(s) IntraMuscular once PRN agitation  
MEDICATIONS  (STANDING):  ARIPiprazole 15 milliGRAM(s) Oral at bedtime  atorvastatin 10 milliGRAM(s) Oral at bedtime  clonazePAM  Tablet 0.5 milliGRAM(s) Oral two times a day  hydrOXYzine hydrochloride 50 milliGRAM(s) Oral at bedtime  insulin lispro (ADMELOG) corrective regimen sliding scale   SubCutaneous three times a day before meals  metFORMIN 500 milliGRAM(s) Oral daily  oxybutynin 5 milliGRAM(s) Oral two times a day  ziprasidone 100 milliGRAM(s) Oral <User Schedule>  ziprasidone 60 milliGRAM(s) Oral <User Schedule>    MEDICATIONS  (PRN):  diphenhydrAMINE 50 milliGRAM(s) Oral every 6 hours PRN agiation  diphenhydrAMINE Injectable 50 milliGRAM(s) IntraMuscular once PRN agiation  haloperidol     Tablet 5 milliGRAM(s) Oral every 6 hours PRN agitation  haloperidol    Injectable 5 milliGRAM(s) IntraMuscular once PRN acute agiation  LORazepam     Tablet 2 milliGRAM(s) Oral every 6 hours PRN agitation  LORazepam   Injectable 2 milliGRAM(s) IntraMuscular once PRN agitation  
MEDICATIONS  (STANDING):  ARIPiprazole 25 milliGRAM(s) Oral at bedtime  atorvastatin 10 milliGRAM(s) Oral at bedtime  clonazePAM  Tablet 0.5 milliGRAM(s) Oral two times a day  hydrOXYzine hydrochloride 50 milliGRAM(s) Oral at bedtime  insulin lispro (ADMELOG) corrective regimen sliding scale   SubCutaneous three times a day before meals  metFORMIN 500 milliGRAM(s) Oral daily  oxybutynin 5 milliGRAM(s) Oral two times a day  ziprasidone 100 milliGRAM(s) Oral <User Schedule>  ziprasidone 60 milliGRAM(s) Oral <User Schedule>    MEDICATIONS  (PRN):  diphenhydrAMINE 50 milliGRAM(s) Oral every 6 hours PRN agiation  diphenhydrAMINE Injectable 50 milliGRAM(s) IntraMuscular once PRN agiation  haloperidol     Tablet 5 milliGRAM(s) Oral every 6 hours PRN agitation  haloperidol    Injectable 5 milliGRAM(s) IntraMuscular once PRN acute agiation  
MEDICATIONS  (STANDING):  ARIPiprazole 20 milliGRAM(s) Oral at bedtime  atorvastatin 10 milliGRAM(s) Oral at bedtime  clonazePAM  Tablet 0.5 milliGRAM(s) Oral two times a day  hydrOXYzine hydrochloride 50 milliGRAM(s) Oral at bedtime  insulin lispro (ADMELOG) corrective regimen sliding scale   SubCutaneous three times a day before meals  metFORMIN 500 milliGRAM(s) Oral daily  oxybutynin 5 milliGRAM(s) Oral two times a day  ziprasidone 100 milliGRAM(s) Oral <User Schedule>  ziprasidone 60 milliGRAM(s) Oral <User Schedule>    MEDICATIONS  (PRN):  diphenhydrAMINE 50 milliGRAM(s) Oral every 6 hours PRN agiation  diphenhydrAMINE Injectable 50 milliGRAM(s) IntraMuscular once PRN agiation  haloperidol     Tablet 5 milliGRAM(s) Oral every 6 hours PRN agitation  haloperidol    Injectable 5 milliGRAM(s) IntraMuscular once PRN acute agiation  LORazepam     Tablet 1 milliGRAM(s) Oral every 6 hours PRN agitation  
MEDICATIONS  (STANDING):  ARIPiprazole 10 milliGRAM(s) Oral at bedtime  atorvastatin 10 milliGRAM(s) Oral at bedtime  clonazePAM  Tablet 0.5 milliGRAM(s) Oral two times a day  hydrOXYzine hydrochloride 50 milliGRAM(s) Oral at bedtime  insulin lispro (ADMELOG) corrective regimen sliding scale   SubCutaneous three times a day before meals  metFORMIN 500 milliGRAM(s) Oral daily  oxybutynin 5 milliGRAM(s) Oral two times a day  ziprasidone 100 milliGRAM(s) Oral <User Schedule>  ziprasidone 60 milliGRAM(s) Oral <User Schedule>    MEDICATIONS  (PRN):  diphenhydrAMINE 50 milliGRAM(s) Oral every 6 hours PRN agiation  diphenhydrAMINE Injectable 50 milliGRAM(s) IntraMuscular once PRN agiation  haloperidol     Tablet 5 milliGRAM(s) Oral every 6 hours PRN agitation  haloperidol    Injectable 5 milliGRAM(s) IntraMuscular once PRN acute agiation  LORazepam     Tablet 2 milliGRAM(s) Oral every 6 hours PRN agitation  LORazepam   Injectable 2 milliGRAM(s) IntraMuscular once PRN agitation  
MEDICATIONS  (STANDING):  ARIPiprazole 25 milliGRAM(s) Oral at bedtime  atorvastatin 10 milliGRAM(s) Oral at bedtime  clonazePAM  Tablet 0.5 milliGRAM(s) Oral two times a day  hydrOXYzine hydrochloride 50 milliGRAM(s) Oral at bedtime  insulin lispro (ADMELOG) corrective regimen sliding scale   SubCutaneous three times a day before meals  metFORMIN 500 milliGRAM(s) Oral daily  oxybutynin 5 milliGRAM(s) Oral two times a day  ziprasidone 100 milliGRAM(s) Oral <User Schedule>  ziprasidone 60 milliGRAM(s) Oral <User Schedule>    MEDICATIONS  (PRN):  diphenhydrAMINE 50 milliGRAM(s) Oral every 6 hours PRN agiation  diphenhydrAMINE Injectable 50 milliGRAM(s) IntraMuscular once PRN agiation  haloperidol     Tablet 5 milliGRAM(s) Oral every 6 hours PRN agitation  haloperidol    Injectable 5 milliGRAM(s) IntraMuscular once PRN acute agiation  LORazepam     Tablet 1 milliGRAM(s) Oral every 6 hours PRN agitation  
MEDICATIONS  (STANDING):  ARIPiprazole 15 milliGRAM(s) Oral at bedtime  atorvastatin 10 milliGRAM(s) Oral at bedtime  clonazePAM  Tablet 0.5 milliGRAM(s) Oral two times a day  hydrOXYzine hydrochloride 50 milliGRAM(s) Oral at bedtime  insulin lispro (ADMELOG) corrective regimen sliding scale   SubCutaneous three times a day before meals  metFORMIN 500 milliGRAM(s) Oral daily  oxybutynin 5 milliGRAM(s) Oral two times a day  ziprasidone 100 milliGRAM(s) Oral <User Schedule>  ziprasidone 60 milliGRAM(s) Oral <User Schedule>    MEDICATIONS  (PRN):  diphenhydrAMINE 50 milliGRAM(s) Oral every 6 hours PRN agiation  diphenhydrAMINE Injectable 50 milliGRAM(s) IntraMuscular once PRN agiation  haloperidol     Tablet 5 milliGRAM(s) Oral every 6 hours PRN agitation  haloperidol    Injectable 5 milliGRAM(s) IntraMuscular once PRN acute agiation  LORazepam     Tablet 1 milliGRAM(s) Oral every 6 hours PRN agitation  LORazepam   Injectable 2 milliGRAM(s) IntraMuscular once PRN agitation  
MEDICATIONS  (STANDING):  ARIPiprazole 10 milliGRAM(s) Oral at bedtime  atorvastatin 10 milliGRAM(s) Oral at bedtime  clonazePAM  Tablet 0.5 milliGRAM(s) Oral two times a day  hydrOXYzine hydrochloride 50 milliGRAM(s) Oral at bedtime  insulin lispro (ADMELOG) corrective regimen sliding scale   SubCutaneous three times a day before meals  metFORMIN 500 milliGRAM(s) Oral daily  oxybutynin 5 milliGRAM(s) Oral two times a day  ziprasidone 100 milliGRAM(s) Oral <User Schedule>  ziprasidone 60 milliGRAM(s) Oral <User Schedule>    MEDICATIONS  (PRN):  diphenhydrAMINE 50 milliGRAM(s) Oral every 6 hours PRN agiation  diphenhydrAMINE Injectable 50 milliGRAM(s) IntraMuscular once PRN agiation  haloperidol     Tablet 5 milliGRAM(s) Oral every 6 hours PRN agitation  haloperidol    Injectable 5 milliGRAM(s) IntraMuscular once PRN acute agiation  LORazepam     Tablet 2 milliGRAM(s) Oral every 6 hours PRN agitation  LORazepam   Injectable 2 milliGRAM(s) IntraMuscular once PRN agitation  
MEDICATIONS  (STANDING):  ARIPiprazole 15 milliGRAM(s) Oral at bedtime  atorvastatin 10 milliGRAM(s) Oral at bedtime  clonazePAM  Tablet 0.5 milliGRAM(s) Oral two times a day  hydrOXYzine hydrochloride 50 milliGRAM(s) Oral at bedtime  insulin lispro (ADMELOG) corrective regimen sliding scale   SubCutaneous three times a day before meals  metFORMIN 500 milliGRAM(s) Oral daily  oxybutynin 5 milliGRAM(s) Oral two times a day  ziprasidone 100 milliGRAM(s) Oral <User Schedule>  ziprasidone 60 milliGRAM(s) Oral <User Schedule>    MEDICATIONS  (PRN):  diphenhydrAMINE 50 milliGRAM(s) Oral every 6 hours PRN agiation  diphenhydrAMINE Injectable 50 milliGRAM(s) IntraMuscular once PRN agiation  haloperidol     Tablet 5 milliGRAM(s) Oral every 6 hours PRN agitation  haloperidol    Injectable 5 milliGRAM(s) IntraMuscular once PRN acute agiation  LORazepam     Tablet 1 milliGRAM(s) Oral every 6 hours PRN agitation  
MEDICATIONS  (STANDING):  ARIPiprazole 25 milliGRAM(s) Oral at bedtime  atorvastatin 10 milliGRAM(s) Oral at bedtime  clonazePAM  Tablet 0.5 milliGRAM(s) Oral two times a day  hydrOXYzine hydrochloride 50 milliGRAM(s) Oral at bedtime  insulin lispro (ADMELOG) corrective regimen sliding scale   SubCutaneous three times a day before meals  metFORMIN 500 milliGRAM(s) Oral daily  oxybutynin 5 milliGRAM(s) Oral two times a day  ziprasidone 100 milliGRAM(s) Oral <User Schedule>  ziprasidone 60 milliGRAM(s) Oral <User Schedule>    MEDICATIONS  (PRN):  diphenhydrAMINE 50 milliGRAM(s) Oral every 6 hours PRN agiation  diphenhydrAMINE Injectable 50 milliGRAM(s) IntraMuscular once PRN agiation  haloperidol     Tablet 5 milliGRAM(s) Oral every 6 hours PRN agitation  haloperidol    Injectable 5 milliGRAM(s) IntraMuscular once PRN acute agiation  LORazepam     Tablet 1 milliGRAM(s) Oral every 6 hours PRN agitation  
MEDICATIONS  (STANDING):  ARIPiprazole 5 milliGRAM(s) Oral at bedtime  atorvastatin 10 milliGRAM(s) Oral at bedtime  clonazePAM  Tablet 0.5 milliGRAM(s) Oral two times a day  hydrOXYzine hydrochloride 50 milliGRAM(s) Oral at bedtime  insulin lispro (ADMELOG) corrective regimen sliding scale   SubCutaneous three times a day before meals  metFORMIN 500 milliGRAM(s) Oral daily  oxybutynin 5 milliGRAM(s) Oral two times a day  ziprasidone 100 milliGRAM(s) Oral <User Schedule>  ziprasidone 60 milliGRAM(s) Oral <User Schedule>    MEDICATIONS  (PRN):  diphenhydrAMINE 50 milliGRAM(s) Oral every 6 hours PRN agiation  diphenhydrAMINE Injectable 50 milliGRAM(s) IntraMuscular once PRN agiation  haloperidol     Tablet 5 milliGRAM(s) Oral every 6 hours PRN agitation  haloperidol    Injectable 5 milliGRAM(s) IntraMuscular once PRN acute agiation  LORazepam     Tablet 2 milliGRAM(s) Oral every 6 hours PRN agitation  LORazepam   Injectable 2 milliGRAM(s) IntraMuscular once PRN agitation  
MEDICATIONS  (STANDING):  ARIPiprazole 20 milliGRAM(s) Oral at bedtime  atorvastatin 10 milliGRAM(s) Oral at bedtime  clonazePAM  Tablet 0.5 milliGRAM(s) Oral two times a day  hydrOXYzine hydrochloride 50 milliGRAM(s) Oral at bedtime  insulin lispro (ADMELOG) corrective regimen sliding scale   SubCutaneous three times a day before meals  metFORMIN 500 milliGRAM(s) Oral daily  oxybutynin 5 milliGRAM(s) Oral two times a day  ziprasidone 60 milliGRAM(s) Oral <User Schedule>  ziprasidone 100 milliGRAM(s) Oral <User Schedule>    MEDICATIONS  (PRN):  diphenhydrAMINE 50 milliGRAM(s) Oral every 6 hours PRN agiation  diphenhydrAMINE Injectable 50 milliGRAM(s) IntraMuscular once PRN agiation  haloperidol     Tablet 5 milliGRAM(s) Oral every 6 hours PRN agitation  haloperidol    Injectable 5 milliGRAM(s) IntraMuscular once PRN acute agiation  LORazepam     Tablet 1 milliGRAM(s) Oral every 6 hours PRN agitation

## 2024-05-02 NOTE — BH TREATMENT PLAN - NSTXPSYCHOGOAL_PSY_ALL_CORE
Will identify 1 thought/feeling/behavior associated with hallucinations
Will identify 2 coping skills that help mitigate hallucinations

## 2024-05-02 NOTE — BH INPATIENT PSYCHIATRY PROGRESS NOTE - NSICDXBHPRIMARYDX_PSY_ALL_CORE
Schizophrenia   F20.9  

## 2024-05-02 NOTE — BH PSYCHOLOGY - GROUP THERAPY NOTE - NSBHPSYCHOLRESPONSE_PSY_A_CORE
Accepted support
Coping skills acquired/Accepted support
Accepted support
Coping skills acquired/Accepted support

## 2024-05-02 NOTE — BH INPATIENT PSYCHIATRY PROGRESS NOTE - NSTXPSYCHOGOAL_PSY_ALL_CORE
Will identify 2 coping skills that help mitigate hallucinations
Will identify 1 thought/feeling/behavior associated with hallucinations
Will identify 2 coping skills that help mitigate hallucinations

## 2024-05-02 NOTE — BH INPATIENT PSYCHIATRY PROGRESS NOTE - NSBHATTESTAPPBILLTIME_PSY_A_CORE
I attest my time as DARLEEN is greater than 50% of the total combined time spent on qualifying patient care activities. I have reviewed and verified the documentation.

## 2024-05-02 NOTE — BH TREATMENT PLAN - NSCMSPTSTRENGTHS_PSY_ALL_CORE
Compliance to treatment/Expressive of emotions/Highly motivated for treatment/Leisure interest/Supportive family
Compliance to treatment/Expressive of emotions/Leisure interest/Positive attitude/Supportive family

## 2024-05-02 NOTE — BH PSYCHOLOGY - GROUP THERAPY NOTE - NSBHPSYCHOLGRPTYPE_PSY_A_CORE
Cognitive Behavioral Coping Skills

## 2024-05-02 NOTE — BH INPATIENT PSYCHIATRY PROGRESS NOTE - NSTXPROBSLPPAT_PSY_ALL_CORE
SLEEP PATTERN, DISTURBED

## 2024-05-02 NOTE — BH INPATIENT PSYCHIATRY PROGRESS NOTE - NSBHATTESTBILLONSITE_PSY_A_CORE
DARLEEN to bill

## 2024-05-02 NOTE — BH PSYCHOLOGY - GROUP THERAPY NOTE - NSPSYCHOLGRPCOGSPCH_PSY_A_CORE FT
within normal limits
within normal limits
wnl
WNL
within normal limits
WNL
within normal limits 
wnl
within normal limits

## 2024-05-02 NOTE — BH PSYCHOLOGY - GROUP THERAPY NOTE - NSPSYCHOLGRPCOGINT_PSY_A_CORE FT
Acceptance & commitment therapy, Emotion regulation/coping skills taught, Psychoeducation 

## 2024-05-03 VITALS — HEART RATE: 86 BPM | SYSTOLIC BLOOD PRESSURE: 129 MMHG | TEMPERATURE: 97 F | DIASTOLIC BLOOD PRESSURE: 78 MMHG

## 2024-05-03 RX ADMIN — Medication 5 MILLIGRAM(S): at 08:52

## 2024-05-03 RX ADMIN — METFORMIN HYDROCHLORIDE 500 MILLIGRAM(S): 850 TABLET ORAL at 08:52

## 2024-05-03 RX ADMIN — ZIPRASIDONE HYDROCHLORIDE 60 MILLIGRAM(S): 20 CAPSULE ORAL at 08:52

## 2024-05-03 RX ADMIN — Medication 0.5 MILLIGRAM(S): at 08:52

## 2024-05-08 DIAGNOSIS — F20.9 SCHIZOPHRENIA, UNSPECIFIED: ICD-10-CM

## 2024-06-18 LAB — GLUCOSE BLDC GLUCOMTR-MCNC: 126 MG/DL — HIGH (ref 70–99)

## 2024-06-20 ENCOUNTER — OUTPATIENT (OUTPATIENT)
Dept: OUTPATIENT SERVICES | Facility: HOSPITAL | Age: 59
LOS: 1 days | Discharge: STILL ADMITTED OR ADMINISTRATIVE | End: 2024-06-20
Payer: MEDICAID

## 2024-06-20 DIAGNOSIS — Z95.828 PRESENCE OF OTHER VASCULAR IMPLANTS AND GRAFTS: Chronic | ICD-10-CM

## 2024-06-20 PROCEDURE — 90839 PSYTX CRISIS INITIAL 60 MIN: CPT

## 2024-06-20 PROCEDURE — 99215 OFFICE O/P EST HI 40 MIN: CPT

## 2024-06-24 DIAGNOSIS — F20.9 SCHIZOPHRENIA, UNSPECIFIED: ICD-10-CM

## 2024-07-01 ENCOUNTER — EMERGENCY (EMERGENCY)
Facility: HOSPITAL | Age: 59
LOS: 1 days | Discharge: PSYCHIATRIC OR SUBSTANCE USE FACILITY | End: 2024-07-01
Attending: EMERGENCY MEDICINE | Admitting: EMERGENCY MEDICINE
Payer: COMMERCIAL

## 2024-07-01 VITALS
HEART RATE: 102 BPM | OXYGEN SATURATION: 96 % | DIASTOLIC BLOOD PRESSURE: 82 MMHG | TEMPERATURE: 98 F | SYSTOLIC BLOOD PRESSURE: 116 MMHG | RESPIRATION RATE: 18 BRPM | HEIGHT: 60.3 IN

## 2024-07-01 VITALS
OXYGEN SATURATION: 98 % | RESPIRATION RATE: 16 BRPM | TEMPERATURE: 98 F | SYSTOLIC BLOOD PRESSURE: 101 MMHG | HEART RATE: 97 BPM | DIASTOLIC BLOOD PRESSURE: 70 MMHG

## 2024-07-01 DIAGNOSIS — Z95.828 PRESENCE OF OTHER VASCULAR IMPLANTS AND GRAFTS: Chronic | ICD-10-CM

## 2024-07-01 PROBLEM — E11.9 TYPE 2 DIABETES MELLITUS WITHOUT COMPLICATIONS: Chronic | Status: ACTIVE | Noted: 2024-06-20

## 2024-07-01 PROCEDURE — 99284 EMERGENCY DEPT VISIT MOD MDM: CPT

## 2024-07-01 PROCEDURE — 93971 EXTREMITY STUDY: CPT | Mod: 26,LT

## 2024-07-04 ENCOUNTER — TRANSCRIPTION ENCOUNTER (OUTPATIENT)
Age: 59
End: 2024-07-04

## 2024-07-19 ENCOUNTER — TRANSCRIPTION ENCOUNTER (OUTPATIENT)
Age: 59
End: 2024-07-19

## 2024-07-26 ENCOUNTER — OUTPATIENT (OUTPATIENT)
Dept: OUTPATIENT SERVICES | Facility: HOSPITAL | Age: 59
LOS: 1 days | Discharge: HOME | End: 2024-07-26
Payer: COMMERCIAL

## 2024-07-26 DIAGNOSIS — Z95.828 PRESENCE OF OTHER VASCULAR IMPLANTS AND GRAFTS: Chronic | ICD-10-CM

## 2024-07-30 NOTE — ECT TREATMENT NOTE - NSECTIMPPLANNOCONTRACMTS_PSY_ALL_CORE
Patient ate this morning. Family report dc instructions from inpatient didn't include NPO prior to appt today. Will r/s.  Brief evaluation patient denying AH, dec mood. Denies SI.

## 2024-07-31 VITALS
TEMPERATURE: 98 F | OXYGEN SATURATION: 98 % | DIASTOLIC BLOOD PRESSURE: 71 MMHG | RESPIRATION RATE: 18 BRPM | SYSTOLIC BLOOD PRESSURE: 124 MMHG | HEART RATE: 89 BPM

## 2024-07-31 DIAGNOSIS — F20.9 SCHIZOPHRENIA, UNSPECIFIED: ICD-10-CM

## 2024-07-31 LAB — GLUCOSE BLDC GLUCOMTR-MCNC: 130 MG/DL — HIGH (ref 70–99)

## 2024-07-31 PROCEDURE — 90870 ELECTROCONVULSIVE THERAPY: CPT

## 2024-07-31 NOTE — ECT TREATMENT NOTE - NSECTCOMMENTS_PSY_ALL_CORE
Pt had her first in to out. Denies any clear mood sx's, no psychotic sx's elicited. Has clear memory decline. Will start maintenance as per tcx plan.

## 2024-08-06 LAB — GLUCOSE BLDC GLUCOMTR-MCNC: 136 MG/DL — HIGH (ref 70–99)

## 2024-08-06 PROCEDURE — 90870 ELECTROCONVULSIVE THERAPY: CPT

## 2024-08-06 NOTE — ECT TREATMENT NOTE - NSECTCOMMENTS_PSY_ALL_CORE
Patient is calm, cooperative. She does not endorse any complaints. She denies any psychotic sx, including ah/vh or paranoia.  Continue with weekly tx.

## 2024-08-20 LAB — GLUCOSE BLDC GLUCOMTR-MCNC: 110 MG/DL — HIGH (ref 70–99)

## 2024-08-20 PROCEDURE — 90870 ELECTROCONVULSIVE THERAPY: CPT

## 2024-08-20 NOTE — ECT TREATMENT NOTE - NSECTCOMMENTS_PSY_ALL_CORE
Patient is calm, cooperative. She denies any psychotic sx, including ah/vh or paranoia. Missed last appointment due to inflamed/infected tooth; has not seen dentist, but tooth was stable on presentation today. Reports concerns of memory impairment; provided support and education. Will continue with weekly tx.

## 2024-08-27 LAB — GLUCOSE BLDC GLUCOMTR-MCNC: 118 MG/DL — HIGH (ref 70–99)

## 2024-08-27 PROCEDURE — 90870 ELECTROCONVULSIVE THERAPY: CPT

## 2024-08-27 NOTE — ECT TREATMENT NOTE - NSECTCOMMENTS_PSY_ALL_CORE
Patient stable through the entire treatment interval without recurrence of presenting symptoms. Mood, energy, sleep, and appetite were within normal limits. Speech with mild increased latency and less spontaneous. She denies any paranoia, other delusions, auditory or visual hallucinations. She reports mild memory concerns that is not impacting her functioning, agrees to discuss her progress with her outpatient psychiatrist at next follow up appointment. She has not seen dentist yet, tooth path is tolerable today.

## 2024-09-03 PROCEDURE — 90870 ELECTROCONVULSIVE THERAPY: CPT

## 2024-09-03 NOTE — ECT TREATMENT NOTE - NSECTCOMMENTS_PSY_ALL_CORE
Patient stable through the entire treatment interval without recurrence of presenting symptoms. Mood, energy, sleep, and appetite were within normal limits. Denied any psychosis - no AH, PI, or IOR. States she enjoys reading books but "I'm taking a break this week." Medication adherent; improved per daughter on the Welcome sheet. We will continue maintenance ECT to help reduce the chances of relapse. Consider further spacing at next visit.

## 2024-09-10 LAB — GLUCOSE BLDC GLUCOMTR-MCNC: 121 MG/DL — HIGH (ref 70–99)

## 2024-09-10 PROCEDURE — 90870 ELECTROCONVULSIVE THERAPY: CPT

## 2024-09-10 NOTE — ECT TREATMENT NOTE - NSECTCOMMENTS_PSY_ALL_CORE
Patient reported ongoing AH at the same level as in previous weeks, notes that they can make it difficult for her to enjoy reading but otherwise are not disturbing her. She enjoyed visiting with her daughter this week and otherwise spent time at home, doing chores. Notes retrograde and anterograde memory concerns she attributes to ECT, unable to specify examples however. Per daughter, via Welcome to ECT form, patient is "much improved" since last treatment; will space to q2 weeks as per protocol and to address the cognitive concerns.

## 2024-09-24 LAB — GLUCOSE BLDC GLUCOMTR-MCNC: 133 MG/DL — HIGH (ref 70–99)

## 2024-09-24 PROCEDURE — 90870 ELECTROCONVULSIVE THERAPY: CPT

## 2024-09-24 NOTE — ECT TREATMENT NOTE - NSECTPOSTTXCOMMENTS_PSY_ALL_CORE
Could consider energy increase due to downtrending seizure durations; would weigh this against ongoing concerns for memory impairment. In addition, patient missed last treatment session. Patient reports continued  good efficacy of treatments. 
Energy modified for trend to shorter seizure duration/to account for 1.5X ST at 45%

## 2024-09-24 NOTE — ECT TREATMENT NOTE - NSECTCOMMENTS_PSY_ALL_CORE
1st two-week interval. Patient reported that AH have dissipated and she is now able to enjoy reading without interference. She c/o some latency in speech initiation X 1 month, reports that her psychiatrist told her it is due to medications. Family reported on welcome sheet that the patient is "very much improved." We will continue maintenance ECT to help reduce the chances of relapse.

## 2024-10-08 LAB — GLUCOSE BLDC GLUCOMTR-MCNC: 119 MG/DL — HIGH (ref 70–99)

## 2024-10-08 PROCEDURE — 90870 ELECTROCONVULSIVE THERAPY: CPT

## 2024-10-08 NOTE — ECT TREATMENT NOTE - NSECTCOMMENTS_PSY_ALL_CORE
2nd two week interval . Patient continue to endorse having +ah but reports not as bothersome and not commanding in nature. Otherwise she is funciotning and able to carry on with her daily activities, eating , sleeping ok. no si/hi reported.    Will continue with q2 weeks treatment as she still a residual sx.

## 2024-10-22 LAB — GLUCOSE BLDC GLUCOMTR-MCNC: 124 MG/DL — HIGH (ref 70–99)

## 2024-10-22 PROCEDURE — 90870 ELECTROCONVULSIVE THERAPY: CPT

## 2024-10-22 NOTE — ECT TREATMENT NOTE - NSECTCOMMENTS_PSY_ALL_CORE
3rd two-week interval - patient denied interval AH, but continues with irregular sleep routine where she "naps" from 9-11p and is unable to return to sleep, low energy. She is tending to her daily activities and good appetite. Denied any other delusions and SIIP/HIIP. Daughter indicates that the Pt is "much improved" on welcome sheet. Pt reports mild cognitive side effects that are tolerable. Will continue maintenance ECT to minimize risk of relapse.

## 2024-11-05 LAB — GLUCOSE BLDC GLUCOMTR-MCNC: 130 MG/DL — HIGH (ref 70–99)

## 2024-11-05 PROCEDURE — 90870 ELECTROCONVULSIVE THERAPY: CPT

## 2024-11-05 NOTE — ECT TREATMENT NOTE - NSECTCOMMENTS_PSY_ALL_CORE
4th two-week interval - patient reports fleeting paranoia once this past week, able to reality test. She appears quiet and guarded, but she denied any other delusions, auditory or visual hallucinations, SIIP or HIIP. Sleeps from 5p-12am, uninterrupted. Stable appetite and energy. She is tending to her daily activities. Daughter indicates that the Pt is "much improved" on welcome sheet. Pt reports ongoing cognitive side effects, which daughter confirms; Pt and daughter understands that decreasing ECT interval could alleviate some cognitive SE.     Will continue maintenance ECT for q2 wk to minimize risk of relapse, can adjust this after Pt's psychiatrist updates tx plan and/or returns our call.     Updated treatment plan is due. Left message with outpatient psychiatrist Dr. June Rainey, confirmed with office that Pt will see her psychiatrist tomorrow; if patient remains stable, Pt and daughter understands that they can call back to reschedule next ECT session for q3 weeks.

## 2024-11-19 LAB — GLUCOSE BLDC GLUCOMTR-MCNC: 112 MG/DL — HIGH (ref 70–99)

## 2024-11-19 PROCEDURE — 90870 ELECTROCONVULSIVE THERAPY: CPT

## 2024-11-19 NOTE — ECT TREATMENT NOTE - NSECTCOMMENTS_PSY_ALL_CORE
5th two-week interval - patient reports "rare" auditory hallucinations of voices that talk to each other, non-command, non-derogatory content. Denied delusions, no reported visual hallucinations. Denied any SI. Pt is tending to her ADLs. Stable mood, appetite, and energy. Sleep remains interrupted, stays up after 12am. Daughter indicates that the Pt is "very much improved" on welcome sheet. Pt reports ongoing cognitive side effects, which daughter confirms; Pt and daughter understands that decreasing ECT interval could alleviate some cognitive SE, discussed plan: We will decrease maintenance ECT for q3 wk to minimize risk of relapse in light of cognitive side effects. Pt and Pt's daughter are aware that they can schedule for earlier ECT session if needed.     Updated treatment plan is due. Left 2nd message to update Pt's psychiatrist about interval changes (Dr. June Rainey [phone])

## 2024-11-19 NOTE — ECT AMBULATORY DISCHARGE PLAN - NSDCPNDISPN_GEN_ALL_CORE
Education provided on the pain management plan of care

## 2024-12-10 LAB — GLUCOSE BLDC GLUCOMTR-MCNC: 122 MG/DL — HIGH (ref 70–99)

## 2024-12-10 PROCEDURE — 90870 ELECTROCONVULSIVE THERAPY: CPT

## 2024-12-10 NOTE — ECT TREATMENT NOTE - NSECTCOMMENTS_PSY_ALL_CORE
Per daughter, patient has been hearing voices, feels persecuted by neighbors, and requests more frequent interval. Patient confirmed that she has had increases in AH over the last several weeks since her last treatment, noting they tell her that her daughters are not hers, or that she has to move. Does not feel obligated to obey; feels safe at home.     Left message for Dr. Rainey. A new treatment plan is overdue. Will commence rescue tx

## 2024-12-10 NOTE — ECT AMBULATORY DISCHARGE PLAN - NSPOSTECTCALLBEFORE_PSY_ALL_CORE
[facility] ([facility]) scheduling office at [phone]
[facility] ([facility]) scheduling office at [phone]
[facility] ([facility]) scheduling office at [phone]
[facility] ([facility]) scheduling office at [phone]
[facility] ([facility]) scheduling office at [phone]
[facility] ([facility]) scheduling office at [phone]
[facility] ([facility]) scheduling office at [phone]
[facility] ([facility]) scheduling office at [phone]
[facility] ([facility]) scheduling office at [phone]
[facility] ([facility]) scheduling office at [phone]
[facility] ([facility]) scheduling office at [phone]
[facility] ([facility]) scheduling office at [phone]

## 2024-12-10 NOTE — ECT AMBULATORY DISCHARGE PLAN - NSPOSTECTFUPHCALL_PSY_ALL_CORE
You will receive a follow-up phone call from a nurse by the next business day after your treatment to ask how you are feeling.

## 2024-12-10 NOTE — ECT AMBULATORY DISCHARGE PLAN - NSPOSTECTPROVEDUCFT_PSY_ALL_CORE
ECT Instruction 
Post ECT Recovery Education
written and verbal discharge instructions 
Covid teaching and D/C instructions 
DC summary
post ECT discharge instructions, covid education
post ECT discharge instructions, covid education
post ECT discharge instruction, covid education
ECT Instruction 
Post Treatment recovery Instructions
Covid instructions
post ECT  discharge instructions, covid education

## 2024-12-12 VITALS
DIASTOLIC BLOOD PRESSURE: 74 MMHG | SYSTOLIC BLOOD PRESSURE: 120 MMHG | OXYGEN SATURATION: 97 % | TEMPERATURE: 97 F | HEART RATE: 83 BPM | RESPIRATION RATE: 17 BRPM

## 2024-12-12 LAB — GLUCOSE BLDC GLUCOMTR-MCNC: 114 MG/DL — HIGH (ref 70–99)

## 2024-12-12 PROCEDURE — 90870 ELECTROCONVULSIVE THERAPY: CPT

## 2024-12-12 NOTE — ECT TREATMENT NOTE - NSICDXBHPRIMARYDX_PSY_ALL_CORE
Schizophrenia   F20.9  

## 2024-12-12 NOTE — ECT PRE-PROCEDURE CHECKLIST - NSPROEDALEARNPREFOTH_GEN_A_NUR
verbal instruction/written material
verbal instruction
verbal instruction
verbal instruction/written material
verbal instruction
verbal instruction
verbal instruction/written material
verbal instruction

## 2024-12-12 NOTE — ECT PRE-PROCEDURE CHECKLIST - INV PERIPH IV SIZE
24 gauge
22 gauge
24 gauge
22 gauge
24 gauge
22 gauge
22 gauge

## 2024-12-12 NOTE — ECT PRE-PROCEDURE CHECKLIST - NSADULTACCOMPNAME_PSY_ALL_CORE
Karissa/ Daughter
Karissa/Daughter
Karissa/ Daughter
Karissa/Daughter
Karissa/Daughter
Karissa/ Daughter
Karissa/Daughter
Karissa/Daughter
Karissa/ Daughter

## 2024-12-12 NOTE — ECT PRE-PROCEDURE CHECKLIST - NSADULTACCOMPRELATION_PSY_ALL_CORE
family
family
Admit to Orthopaedic Service.  Presents today for elective L5-S1 [facility]   Pt medically stable and cleared for procedure today by Dr. Carney
family

## 2024-12-12 NOTE — ECT AMBULATORY DISCHARGE PLAN - NSPOSTECTNEXTSCHTXDT_PSY_ALL_CORE
31-Jul-2024 09:45
24-Sep-2024 09:30
12-Dec-2024 14:00
27-Aug-2024 09:30
10-Sep-2024 13:45
13-Aug-2024 13:30
16-Dec-2024 08:30
22-Oct-2024 09:15
10-Dec-2024 09:15
08-Oct-2024 09:15
03-Sep-2024 09:30
07-Aug-2024 09:15
19-Nov-2024 09:00
05-Nov-2024 10:30

## 2024-12-12 NOTE — ECT AMBULATORY DISCHARGE PLAN - NSPROEXTENSOFSELF_PSY_ALL_CORE
dentures
none
dentures
none
dentures
dentures

## 2024-12-12 NOTE — ECT PRE-PROCEDURE CHECKLIST - PATIENT'S GENDER IDENTITY
Female
Withheld/Decline to Answer
Female
Withheld/Decline to Answer
Female
Withheld/Decline to Answer
Withheld/Decline to Answer
Female

## 2024-12-12 NOTE — ECT TREATMENT NOTE - NSECTCARDIOCOMMENT_PSY_ALL_CORE
sickle cell

## 2024-12-12 NOTE — ECT PRE-PROCEDURE CHECKLIST - TO WHOM
Pre ECT RN to Procedure Room RN
Pre assessment RN to Procedure Room RN
 Pre ECT RN to Procedure Room RN
Pre assessment RN to Procedure Room RN

## 2024-12-12 NOTE — ECT PRE-PROCEDURE CHECKLIST - SELECT TESTS ORDERED
/POCT Blood Glucose
 11/19/24/POCT Blood Glucose

## 2024-12-12 NOTE — ECT TREATMENT NOTE - NSECTCHANGEFREQ_PSY_ALL_CORE
Decrease
Decrease
No change
No change
Increase
Decrease
No change

## 2024-12-12 NOTE — ECT TREATMENT NOTE - NSECTIMPPLAN_PSY_ALL_CORE
Assessment today offers no contraindications to continue plan of treatment with ECT.
Assessment today offers no contraindications to continue plan of treatment with ECT.
ECT Cancelled for today - evaluation reveals:
Assessment today offers no contraindications to continue plan of treatment with ECT.

## 2024-12-12 NOTE — ECT TREATMENT NOTE - NSECTTXELECPLACE_PSY_ALL_CORE
Bitemporal

## 2024-12-12 NOTE — ECT PRE-PROCEDURE CHECKLIST - CAREGIVER NAME
Karissa

## 2024-12-12 NOTE — ECT AMBULATORY DISCHARGE PLAN - NSPOSTECTRESTRIC_PSY_ALL_CORE
Drive a car, operate power tools or machinery./Drink alcohol, beer, or wine./Make important personal and business decisions./If you have had any type of sedation, you may experience lightheadedness, dizziness, or sleepiness following your procedure.  A responsible adult should stay with you for at least 24 hours following your procedure.

## 2024-12-12 NOTE — ECT TREATMENT NOTE - NSECTFOCPECOMPLET_PSY_ALL_CORE
Focused Physical Exam Completed

## 2024-12-12 NOTE — ECT TREATMENT NOTE - NSSUICPROTFACT_PSY_ALL_CORE
Responsibility to children, family, or others/Identifies reasons for living/Supportive social network of family or friends/Fear of death or the actual act of killing self/Cultural, spiritual and/or moral attitudes against suicide/Positive therapeutic relationships
Responsibility to children, family, or others/Identifies reasons for living
Responsibility to children, family, or others/Identifies reasons for living/Supportive social network of family or friends/Fear of death or the actual act of killing self/Cultural, spiritual and/or moral attitudes against suicide/Positive therapeutic relationships
Responsibility to children, family, or others/Identifies reasons for living/Supportive social network of family or friends/Fear of death or the actual act of killing self/Cultural, spiritual and/or moral attitudes against suicide/Positive therapeutic relationships
Responsibility to children, family, or others/Identifies reasons for living
Responsibility to children, family, or others/Identifies reasons for living/Supportive social network of family or friends/Fear of death or the actual act of killing self/Cultural, spiritual and/or moral attitudes against suicide/Positive therapeutic relationships
Responsibility to children, family, or others/Identifies reasons for living/Supportive social network of family or friends/Fear of death or the actual act of killing self/Cultural, spiritual and/or moral attitudes against suicide/Positive therapeutic relationships
Responsibility to children, family, or others/Identifies reasons for living

## 2024-12-12 NOTE — ECT AMBULATORY DISCHARGE PLAN - NSPOSTECTWORSEPSYCHSX_PSY_ALL_CORE
If you are experiencing heightened or worsening psychological symptoms please contact your private psychiatrist.

## 2024-12-12 NOTE — ECT PRE-PROCEDURE CHECKLIST - PATIENT PROBLEMS/NEEDS
Patient expressed no known problems or needs

## 2024-12-12 NOTE — ECT AMBULATORY DISCHARGE PLAN - PATIENT PORTAL LINK FT
You can access the FollowMyHealth Patient Portal offered by [facility] by registering at the following website: [email or web address]. By joining [employer]’s FollowMyHealth portal, you will also be able to view your health information using other applications (apps) compatible with our system.
You can access the FollowMyHealth Patient Portal offered by [facility] by registering at the following website: [email or web address]. By joining [employer]’s FollowMyHealth portal, you will also be able to view your health information using other applications (apps) compatible with our system.
You can access the FollowMyHealth Patient Portal offered by [facility] by registering at the following website: [email or web address]. By joining [employer]’s FollowMyHealth portal, you will also be able to view your health information using other applications (apps) compatible with our system.
You can access the FollowMyHealth Patient Portal offered by [facility] by registering at the following website: [email or web address]. By joining [employer]’s FollowMyHealth portal, you will also be able to view your health information using other applications (apps) compatible with our system.
You can access the FollowMyHealth Patient Portal offered by [facility] by registering at the following website: [email or web address]. By joining [employer]’s FollowMyHealth portal, you will also be able to view your health information using other applications (apps) compatible with our system.
You can access the FollowMyHealth Patient Portal offered by [facility] by registering at the following website: [email or web address]. By joining [email or web address]’s FollowMyHealth portal, you will also be able to view your health information using other applications (apps) compatible with our system.
You can access the FollowMyHealth Patient Portal offered by [facility] by registering at the following website: [email or web address]. By joining [employer]’s FollowMyHealth portal, you will also be able to view your health information using other applications (apps) compatible with our system.
You can access the FollowMyHealth Patient Portal offered by [facility] by registering at the following website: [email or web address]. By joining [employer]’s FollowMyHealth portal, you will also be able to view your health information using other applications (apps) compatible with our system.
You can access the FollowMyHealth Patient Portal offered by [facility] by registering at the following website: [email or web address]. By joining [employer]’s FollowMyHealth portal, you will also be able to view your health information using other applications (apps) compatible with our system.
You can access the FollowMyHealth Patient Portal offered by [facility] by registering at the following website: [email or web address]. By joining [employer]’s FollowMyHealth portal, you will also be able to view your health information using other applications (apps) compatible with our system.
You can access the FollowMyHealth Patient Portal offered by [facility] by registering at the following website: [email or web address]. By joining [employer]’s FollowMyHealth portal, you will also be able to view your health information using other applications (apps) compatible with our system.
You can access the FollowMyHealth Patient Portal offered by [facility] by registering at the following website: [email or web address]. By joining [employer]’s FollowMyHealth portal, you will also be able to view your health information using other applications (apps) compatible with our system.
You can access the FollowMyHealth Patient Portal offered by [facility] by registering at the following website: [email or web address]. By joining [employer]’s FollowMyHealth portal, you will also be able to view your health information using other applications (apps) compatible with our system.
You can access the FollowMyHealth Patient Portal offered by [facility] by registering at the following website: [email or web address]. By joining [employer]’s FollowMyHealth portal, you will also be able to view your health information using other applications (apps) compatible with our system.

## 2024-12-12 NOTE — ECT PRE-PROCEDURE CHECKLIST - NSPROEDALEARNPREF_GEN_A_NUR
verbal instruction/written material

## 2024-12-12 NOTE — ECT PRE-PROCEDURE CHECKLIST - NSPTSENTTO_PSY_ALL_CORE
holding area
procedural room
holding area
procedural room
holding area
holding area
procedural room
procedural room
holding area

## 2024-12-12 NOTE — ECT PRE-PROCEDURE CHECKLIST - NSTRANSFERDENTURES_GEN_A_NUR
partial/upper
partial
partial/upper
full
partial/upper
partial/upper

## 2024-12-12 NOTE — ECT AMBULATORY DISCHARGE PLAN - NSPOSTECTPOSSCOMP_PSY_ALL_CORE
Headache, nausea, general body aches are common experiences after this treatment.  These may be treated with over-the-counter pain medication.

## 2024-12-12 NOTE — ECT PRE-PROCEDURE CHECKLIST - PERIPHERAL IV: INSERTION DATE
10-Sep-2024
24-Sep-2024
20-Aug-2024
10-Dec-2024
03-Sep-2024
05-Nov-2024
26-Aug-2024
31-Jul-2024
19-Nov-2024
22-Oct-2024
06-Aug-2024
08-Oct-2024
12-Dec-2024

## 2024-12-12 NOTE — ECT PRE-PROCEDURE CHECKLIST - NSECTCONSENT_PSY_ALL_CORE
ECT consent  BTM obtained on 9/3/24-3/3/25  Anesthesia consent expires on 1/8/25/yes
ECT consent  BTTM obtained on 9/3/24-3/3/25  Anesthesia consent expires on 1/8/25/yes
ECT consent  BTTM obtained on 8/6/2024  Anesthesia consent expires on 1/8/25/yes
ECT consent  obtained on 7/2/24  Anesthesia consent expires on 1/8/25/yes
ECT consent  BTTM obtained on 9/3/24-3/3/25  Anesthesia consent expires on 1/8/25/yes
ECT consent  BTM obtained on 9/3/24-3/3/25  Anesthesia consent expires on 1/8/25/yes
ECT consent  BTTM obtained on 8/6/2024  Anesthesia consent expires on 1/8/25/yes
ECT consent  BTM obtained on 9/3/24-3/3/25  Anesthesia consent expires on 1/8/25/yes
ECT consent  BTTM obtained on 8/6/2024  Anesthesia consent expires on 1/8/25/yes
ECT consent  BTTM obtained on 8/6/2024  Anesthesia consent expires on 1/8/25/yes
ECT consent  BTTM obtained on 9/3/24-3/3/25  Anesthesia consent expires on 1/8/25/yes

## 2024-12-12 NOTE — ECT PRE-PROCEDURE CHECKLIST - NSECTNPODT_PSY_ALL_CORE
07-Oct-2024 20:00
02-Sep-2024 22:00
09-Dec-2024 22:00
23-Sep-2024 20:00
21-Oct-2024 20:00
10-Sep-2024 00:00
19-Aug-2024 20:00
05-Aug-2024 19:00
26-Aug-2024 20:00
11-Dec-2024 19:00
05-Nov-2024 00:00
30-Jul-2024 22:00
18-Nov-2024 22:00

## 2024-12-12 NOTE — ECT AMBULATORY DISCHARGE PLAN - NSPROCPERF_PSY_ALL_CORE
Electroconvulsive Therapy (ECT)
Electroconvulsive Therapy (ECT)
Electroconvulsive Therapy (ECT) cancelled for today
Electroconvulsive Therapy (ECT)

## 2024-12-12 NOTE — ECT AMBULATORY DISCHARGE PLAN - FINANCIAL ASSISTANCE
[facility] provides services at a reduced cost to those who are determined to be eligible through [facility]’s financial assistance program. Information regarding [facility]’s financial assistance program can be found by going to https://www.[facility]/assistance or by calling [phone]) [phone].
[facility] provides services at a reduced cost to those who are determined to be eligible through [facility]’s financial assistance program. Information regarding [facility]’s financial assistance program can be found by going to https://www.[facility]/assistance or by calling [phone]) [phone].
[facility] provides services at a reduced cost to those who are determined to be eligible through [facility]’s financial assistance program. Information regarding [facility]’s financial assistance program can be found by going to https://www.[facility]/assistance or by calling [phone]) [phone].
[facility] provides services at a reduced cost to those who are determined to be eligible through [facility]’s financial assistance program. Information regarding [facility]’s financial assistance program can be found by going to https://www.[facility]/assistance or by calling 1(869) [phone].
[facility] provides services at a reduced cost to those who are determined to be eligible through [facility]’s financial assistance program. Information regarding [facility]’s financial assistance program can be found by going to https://www.[facility]/assistance or by calling [phone]) [phone].

## 2024-12-12 NOTE — ECT PRE-PROCEDURE CHECKLIST - TEMPERATURE IN CELSIUS (DEGREES C)
36.6
36.8
36.6
37.1
36.3
37
36.7
36.7
36.4
36.7
36.3
36.6
Erythromycin Pregnancy And Lactation Text: This medication is Pregnancy Category B and is considered safe during pregnancy. It is also excreted in breast milk.

## 2024-12-12 NOTE — ECT PRE-PROCEDURE CHECKLIST - CAREGIVER ADDRESS
same as pt 

## 2024-12-12 NOTE — ECT PRE-PROCEDURE CHECKLIST - CAREGIVER PHONE NUMBER
[phone]
[phone]
[phone]
[phone]
[phone]
[phone]
[phone]
[phone]
[phone]
[phone]
[phone]
[phone]
[phone]

## 2024-12-12 NOTE — ECT TREATMENT NOTE - NSECTREVSYS_PSY_ALL_CORE
Cardiovascular

## 2024-12-12 NOTE — ECT PRE-PROCEDURE CHECKLIST - PATIENT'S PREFERRED PRONOUN
Her/She

## 2024-12-12 NOTE — ECT PRE-PROCEDURE CHECKLIST - NSADULTACCOMPPHNUMBER_PSY_ALL_CORE
[phone]
[phone]
[phone]
[phone]
[phone]
[phone]
[phone]
[phone]
[phone]
[phone]
[phone]
[phone]
[phone]

## 2024-12-12 NOTE — ECT AMBULATORY DISCHARGE PLAN - NSDCPEFALRISK_GEN_ALL_CORE
For information on Fall & Injury Prevention, visit: https://www.[facility]/news/fall-prevention-protects-and-maintains-health-and-mobility OR  https://www.[facility]/news/fall-prevention-tips-to-avoid-injury OR  https://www.cdc.gov/steadi/patient.html
For information on Fall & Injury Prevention, visit: https://www.[facility]/news/fall-prevention-protects-and-maintains-health-and-mobility OR  https://www.[facility]/news/fall-prevention-tips-to-avoid-injury OR  https://www.cdc.gov/steadi/patient.html
For information on Fall & Injury Prevention, visit: https://www.[facility]/news/fall-prevention-protects-and-maintains-health-and-mobility OR  https://www.[facility]/news/fall-prevention-tips-to-avoid-injury OR  https://www.cdc.gov/steadi/patient.html
For information on Fall & Injury Prevention, visit: https://www.[facility]/news/fall-prevention-protects-and-maintains-health-and-mobility OR  https://www.[facility]/news/fall-prevention-tips-to-avoid-injury OR  https://www.cdc.gov/steadi/patient.html
For information on Fall & Injury Prevention, visit: https://www.[facility]/news/fall-prevention-protects-and-maintains-health-and-mobility OR  https://www.[facility]/news/fall-prevention-tips-to-avoid-injury OR  https://www.cdc.gov/steadi/patient.html
For information on Fall & Injury Prevention, visit: https://www.[facility]/news/fall-prevention-protects-and-maintains-health-and-mobility OR  https://www.[facility]/news/fall-prevention-tips-to-avoid-injury OR  https://www.cdc.gov/steadi/patient.html
For information on Fall & Injury Prevention, visit: https://www.[facility]/news/fall-prevention-protects-and-maintains-health-and-mobility OR  https://www.[facility]/news/fall-prevention-tips-to-avoid-injury OR  https://www.cdc.gov/steadi/patient.html
For information on Fall & Injury Prevention, visit: https://www.[facility]/news/fall-prevention-protects-and-maintains-health-and-mobility OR  https://www.[facility]/news/fall-prevention-tips-to-avoid-injury OR  https://www.cdc.gov/steadi/patient.html
For information on Fall & Injury Prevention, visit: https://www.[facility]/news/fall-prevention-protects-and-maintains-health-and-mobility OR  https://www.[facility]/news/fall-prevention-tips-to-avoid-injury OR  https://www.cdc.gov/steadi/patient.html
For information on Fall & Injury Prevention, visit: https://www.[facility]/news/fall-prevention-protects-and-maintains-health-and-mobility OR  https://www.[facility]/news/fall-prevention-tips-to-avoid-injury OR  https://www.cdc.gov/steadi/patient.html
For information on Fall & Injury Prevention, visit: https://www.[facility]/news/fall-prevention-protects-and-maintains-health-and-mobility OR  https://www.[facility]/news/fall-prevention-tips-to-avoid-injury OR  https://www.cdc.gov/steadi/patient.html
For information on Fall & Injury Prevention, visit: https://www.[facility]/news/fall-prevention-protects-and-maintains-health-and-mobility OR  https://www.[facility]/news/fall-prevention-tips-to-avoid-injury OR  https://www.cdc.gov/steadi/patient.html
For information on Fall & Injury Prevention, visit: https://www.[facility]/news/fall-prevention-protects-and-maintains-health-and-mobility OR  https://www.[facility]/news/fall-prevention-tips-to-avoid-injury OR  https://www.cdc.gov/steadi/patient.html
For information on Fall & Injury Prevention, visit: https://www.[facility]/news/fall-prevention-protects-and-maintains-health-and-mobility OR  https://www.[facility]/news/fall-prevention-tips-to-avoid-injury OR  https://www.cdc.gov/steadi/patient.html

## 2024-12-12 NOTE — ECT TREATMENT NOTE - NSECTCPTCODE_PSY_ALL_CORE
93937 (ECT-Electroconvulsive Therapy)
27684 (ECT-Electroconvulsive Therapy)
93550 (ECT-Electroconvulsive Therapy)
69343 (ECT-Electroconvulsive Therapy)
76339 (ECT-Electroconvulsive Therapy)
90659 (ECT-Electroconvulsive Therapy)
92228 (ECT-Electroconvulsive Therapy)
30880 (ECT-Electroconvulsive Therapy)
48722 (ECT-Electroconvulsive Therapy)
36252 (ECT-Electroconvulsive Therapy)
95587 (ECT-Electroconvulsive Therapy)
33139 (ECT-Electroconvulsive Therapy)
80507 (ECT-Electroconvulsive Therapy)

## 2024-12-12 NOTE — ECT PRE-PROCEDURE CHECKLIST - CAREGIVER RELATION TO PATIENT
daughter

## 2024-12-12 NOTE — ECT AMBULATORY DISCHARGE PLAN - MODE OF TRANSPORTATION
Wheelchair/Stroller

## 2024-12-12 NOTE — ECT AMBULATORY DISCHARGE PLAN - NSPOSTECTSYMPTOMS_PSY_ALL_CORE
Excessive Diarrhea/Fever/Inability to tolerate liquids or foods/Increased irritability or sluggishness/Nausea and vomiting that does not stop/Pain not relieved by medications/Unable to urinate

## 2024-12-12 NOTE — ECT TREATMENT NOTE - NSECTPOSTTXSUMMARY_PSY_ALL_CORE
The patient had a well modified grand mal seizure under general anesthesia and muscle relaxant. The patient is alert, responsive, in no acute distress.  Recovery uneventful.  

## 2024-12-12 NOTE — ECT TREATMENT NOTE - NSECTTXPERFDATETIME_PSY_ALL_CORE
03-Sep-2024 10:42
22-Oct-2024 10:17
31-Jul-2024 10:45
10-Sep-2024 09:31
05-Nov-2024 09:43
10-Dec-2024 09:18
27-Aug-2024 10:44
20-Aug-2024 12:00
12-Dec-2024 10:02
19-Nov-2024 09:48
08-Oct-2024 09:40
24-Sep-2024 10:18
06-Aug-2024 09:48

## 2024-12-12 NOTE — ECT PRE-PROCEDURE CHECKLIST - INTERNATIONAL TRAVEL
I have reviewed discharge instructions with the patient and spouse. The patient and spouse verbalized understanding. Discharge medications discussed with patient. No questions at this time. Ambulated without difficulty.  
No

## 2024-12-12 NOTE — ECT PRE-PROCEDURE CHECKLIST - NSMEDSDOSETAKEN_PSY_ALL_CORE
Geodon 20 mg, Oxybutynin 5 mg, Risperdal 3 mg, Atorvastatin 10 mg, Metformin 500 mg
None
None
Geodon 20 mg, Oxybutynin 5 mg, Risperdal 3 mg, Atorvastatin 10 mg, Metformin 500 mg
None

## 2024-12-12 NOTE — ECT AMBULATORY DISCHARGE PLAN - NSPOSTECTCALLZHH_PSY_ALL_CORE
Call the [facility] ECT suite at [phone]
Call the [facility] ECT suite at [phone]
Call the [facility] ECT suite at [phone]
Call the [facility] ECT suite at [phone]
Call the [facility] ECT suite at [phone]
Call the [facility] ECT suite at [phone]
Call the [facility] ECT suite at [phone]
Call the [facility] ECT suite at [phone]
Call the [facility] ECT suite at [phone]
Call the [facility] ECT suite at [phone]
Call the [facility] ECT suite at [phone]
Call the [facility] ECT suite at [phone]

## 2024-12-12 NOTE — ECT AMBULATORY DISCHARGE PLAN - NSPOSTECTCONTPROV_PSY_ALL_CORE
[facility] ([facility]) ECT Suite at [phone]
[facility] ([facility]) ECT Suite at [phone]
[facility] ([facility]) ECT Suite at [phone]
[facility] ([facility]) ECT Suite at [phone]
[facility] ([facility]) ECT Suite at [phone]
[facility] ([facility]) ECT Suite at [phone]
[facility] ([facility]) ECT Suite at [phone]
[facility] ([facility]) ECT Suite at [phone]
[facility] ([facility]) ECT Suite at [phone]
[facility] ([facility]) ECT Suite at [phone]
[facility] ([facility]) ECT Suite at [phone]
[facility] ([facility]) ECT Suite at [phone]

## 2024-12-12 NOTE — ECT PRE-PROCEDURE CHECKLIST - ALLERGIES
Allergies:-  No Known Allergies      

## 2024-12-12 NOTE — ECT PRE-PROCEDURE CHECKLIST - NSPTSENTVIA_PSY_ALL_CORE
ambulate

## 2024-12-12 NOTE — ECT TREATMENT NOTE - NSECTCOMMENTS_PSY_ALL_CORE
Patient returns for second 2x/week rescue treatment.  She reports improvement since first rescue treatment, noting that the AH have been less bothersome. She reports poor sleep, but notes it is also improving. SHe reports appropriate appetite. Patient reports continued ability to ignore voices, still feels safe at home. Feels that she is improving but not yet back at baseline. Family reports "much improved" on welcome sheet.    Will continue with rescue treatments for continued clinical improvement.     A new treatment plan is overdue.  Patient returns for second 2x/week rescue treatment.  She reports improvement since first rescue treatment, noting that the AH have been less bothersome. She reports poor sleep, but notes it is also improving. SHe reports appropriate appetite. Patient reports continued ability to ignore voices, still feels safe at home. Feels that she is improving but not yet back at baseline. Family reports "much improved" on welcome sheet.    Recommended to continue 2x/week rescue treatments for continued clinical improvement.   Patient's daughter report she is unable to bring patient twice weekly, instead noting she can only come in once a week on Thursdays. Recommended twice weekly treatment, patient's daughter verbalized understanding and notes they will come in weekly only.    A new treatment plan is overdue.

## 2024-12-12 NOTE — ECT AMBULATORY DISCHARGE PLAN - NSECTPROCEDUREDATE_PSY_ALL_CORE
22-Oct-2024
06-Aug-2024
08-Oct-2024
30-Jul-2024
19-Nov-2024
10-Dec-2024
12-Dec-2024
27-Aug-2024
20-Aug-2024
10-Sep-2024
31-Jul-2024
05-Nov-2024
03-Sep-2024
24-Sep-2024

## 2024-12-12 NOTE — ECT PRE-PROCEDURE CHECKLIST - NSPROEDAREADYLEARN_GEN_A_NUR
Problem: Patient Care Overview  Goal: Plan of Care Review  Outcome: Ongoing (interventions implemented as appropriate)   10/06/18 9242   Coping/Psychosocial   Plan of Care Reviewed With patient;family   Plan of Care Review   Progress improving   OTHER   Outcome Summary A&O x4, Soft collar in place. Lortab for pain Q4hrs. SL IV eating and drinking well. No c/o chest discomfort. Voiding w/o difficulty. Trop neg x 3. No other concerns at this time, will continue to monitor. CCRN 10/06/18 @ 1800         
acuteness of illness

## 2024-12-12 NOTE — ECT PRE-PROCEDURE CHECKLIST - AS BP NONINV SITE
left upper arm

## 2024-12-12 NOTE — ECT AMBULATORY DISCHARGE PLAN - NSPOSTECTDIET_PSY_ALL_CORE
Gradually resume your regular diet/Increase fluids

## 2024-12-12 NOTE — ECT PRE-PROCEDURE CHECKLIST - PATIENT'S SEXUAL ORIENTATION
Heterosexual
Withheld/Decline to Answer
Withheld/Decline to Answer
Heterosexual
Withheld/Decline to Answer
Heterosexual
Heterosexual
Withheld/Decline to Answer
Heterosexual

## 2025-01-16 NOTE — BH CANCELLATION/NO SHOW NOTE - NSCANCELCOMMENT_PSY_ALL_CORE
Pt has an infected tooth. Advised by anesthesia to get evaluated by a dentist.
No medical clearance

## 2025-01-16 NOTE — BH CANCELLATION/NO SHOW NOTE - NSTYPENOTE_PSY_ALL_CORE
Clinician cancelled appointment
No show/No call
Patient/Family called to cancel
Patient/Family called to cancel
Clinician cancelled appointment
Clinician cancelled appointment

## 2025-03-28 NOTE — ECT OUTPATIENT PROGRAM DISCHARGE SUMMARY - NSECTTREATMENTSUMMARY_PSY_ALL_CORE
Patient received 17 bitemporal ECT treatments with good response, able to maintain stability at q2week interval. During change to q3week interval patient had return of symptoms, and discontinued treatment during course of rescue ECT. Patient was lost to follow up, missing several scheduled appointments due to reasons of "Change in treatment" and "needs medical clearance." Patient was in stable condition on discharge.

## 2025-03-28 NOTE — ECT OUTPATIENT PROGRAM DISCHARGE SUMMARY - NSECTDISCHARGEECTPROGRAM_PSY_ALL_CORE
Patient received 17 bitemporal ECT treatments with good response. Discontinued treatment during course of rescue ECT. Patient was lost to follow up, missing several scheduled appointments due to reasons of "Change in treatment" and "needs medical clearance." Patient was in stable condition upon discharge, recommend close follow-up with outpatient provider.

## 2025-09-08 ENCOUNTER — NON-APPOINTMENT (OUTPATIENT)
Age: 60
End: 2025-09-08